# Patient Record
Sex: FEMALE | Race: BLACK OR AFRICAN AMERICAN | Employment: FULL TIME | URBAN - METROPOLITAN AREA
[De-identification: names, ages, dates, MRNs, and addresses within clinical notes are randomized per-mention and may not be internally consistent; named-entity substitution may affect disease eponyms.]

---

## 2017-01-06 ENCOUNTER — TELEPHONE (OUTPATIENT)
Dept: FAMILY MEDICINE CLINIC | Facility: CLINIC | Age: 57
End: 2017-01-06

## 2017-01-10 ENCOUNTER — TELEPHONE (OUTPATIENT)
Dept: FAMILY MEDICINE CLINIC | Facility: CLINIC | Age: 57
End: 2017-01-10

## 2017-01-10 NOTE — TELEPHONE ENCOUNTER
Call placed to pt. Normal mammogram results given via phone. Pt verbalized understanding. No further questions.

## 2017-01-14 ENCOUNTER — TELEPHONE (OUTPATIENT)
Dept: FAMILY MEDICINE CLINIC | Facility: CLINIC | Age: 57
End: 2017-01-14

## 2017-01-14 NOTE — TELEPHONE ENCOUNTER
Norma Conrad from [facility]) breast center calling to get the ICD10 code changed to diagnostic instead of a screening please advise

## 2017-01-26 ENCOUNTER — OFFICE VISIT (OUTPATIENT)
Dept: FAMILY MEDICINE CLINIC | Facility: CLINIC | Age: 57
End: 2017-01-26

## 2017-01-26 ENCOUNTER — TELEPHONE (OUTPATIENT)
Dept: FAMILY MEDICINE CLINIC | Facility: CLINIC | Age: 57
End: 2017-01-26

## 2017-01-26 VITALS
HEART RATE: 86 BPM | TEMPERATURE: 98 F | DIASTOLIC BLOOD PRESSURE: 90 MMHG | HEIGHT: 65 IN | SYSTOLIC BLOOD PRESSURE: 139 MMHG | BODY MASS INDEX: 43.42 KG/M2 | WEIGHT: 260.63 LBS

## 2017-01-26 DIAGNOSIS — Z98.890 S/P BUNIONECTOMY: ICD-10-CM

## 2017-01-26 DIAGNOSIS — M79.672 FOOT PAIN, LEFT: Primary | ICD-10-CM

## 2017-01-26 DIAGNOSIS — E66.01 MORBID OBESITY WITH BMI OF 40.0-44.9, ADULT (HCC): ICD-10-CM

## 2017-01-26 PROCEDURE — 99212 OFFICE O/P EST SF 10 MIN: CPT | Performed by: FAMILY MEDICINE

## 2017-01-26 PROCEDURE — 99213 OFFICE O/P EST LOW 20 MIN: CPT | Performed by: FAMILY MEDICINE

## 2017-01-26 RX ORDER — PHENTERMINE HYDROCHLORIDE 30 MG/1
CAPSULE ORAL
Qty: 30 CAPSULE | Refills: 5 | Status: SHIPPED | OUTPATIENT
Start: 2017-01-26 | End: 2017-09-25 | Stop reason: ALTCHOICE

## 2017-01-26 NOTE — TELEPHONE ENCOUNTER
Alisson Fernandez. I am seeing our common patient Sheridan Mota today. She underwent left foot bunionectomy and second toe hammer toe corrective surgery in January, 2016, one year ago, and is still having a lot of pain, cannot walk comfortably.  The dorsum of

## 2017-01-26 NOTE — TELEPHONE ENCOUNTER
Sorry to hear that, should not be. i havent seen her for a few months, please send her over and will take a closer look.  Thank you scr

## 2017-01-26 NOTE — PROGRESS NOTES
HPI:    Patient ID: Ulises Short is a 61year old female. Patient presents with: Foot Pain: Pt had bunionectomy done last Jan, but states her left foot is still hurting and swelling. Pt states hurts to walk. Pt states pain is 7/10.  Pt states she has been 40.0-44.9, adult (hcc)  1. Foot pain, left  Resulting from previous anatomic problems and surgery  Will contact Dr Cong Michelle and seek his advice re next step(s)  Handicapped parking sticker application form completed and signed.  Pt ok with otc ibuprofen for p

## 2017-01-27 NOTE — TELEPHONE ENCOUNTER
Call pt. Dr Ariel Grijalva got back in touch with me. Next step for evaluating ongoing foot pain is to see Dr Ariel Grijalva again. Have pt call his office for an appt.

## 2017-01-30 ENCOUNTER — OFFICE VISIT (OUTPATIENT)
Dept: PODIATRY CLINIC | Facility: CLINIC | Age: 57
End: 2017-01-30

## 2017-01-30 ENCOUNTER — HOSPITAL ENCOUNTER (OUTPATIENT)
Dept: GENERAL RADIOLOGY | Facility: HOSPITAL | Age: 57
Discharge: HOME | End: 2017-01-30
Attending: PODIATRIST
Payer: COMMERCIAL

## 2017-01-30 DIAGNOSIS — Z47.89 ORTHOPEDIC AFTERCARE: ICD-10-CM

## 2017-01-30 DIAGNOSIS — Z47.89 ORTHOPEDIC AFTERCARE: Primary | ICD-10-CM

## 2017-01-30 DIAGNOSIS — M20.12 HALLUX VALGUS OF LEFT FOOT: ICD-10-CM

## 2017-01-30 PROCEDURE — 99213 OFFICE O/P EST LOW 20 MIN: CPT | Performed by: PODIATRIST

## 2017-01-30 PROCEDURE — L4386 NON-PNEUM WALK BOOT PRE CST: HCPCS | Performed by: PODIATRIST

## 2017-01-30 PROCEDURE — 99212 OFFICE O/P EST SF 10 MIN: CPT | Performed by: PODIATRIST

## 2017-01-30 PROCEDURE — 73630 X-RAY EXAM OF FOOT: CPT

## 2017-01-30 NOTE — PROGRESS NOTES
HPI:    Patient ID: Jeane Godwin is a 61year old female. HPI  This 58-year-old female presents with continued left forefoot pain. She is accompanied today by her .    contacted me mid week last week about the redness continued pain and excellent passive motion of the metatarsophalangeal joint without significant pain but there is palpable discomfort in the osteotomy region. 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   Based on the fact that pa

## 2017-03-01 ENCOUNTER — OFFICE VISIT (OUTPATIENT)
Dept: PODIATRY CLINIC | Facility: CLINIC | Age: 57
End: 2017-03-01

## 2017-03-01 DIAGNOSIS — M20.12 HALLUX VALGUS OF LEFT FOOT: Primary | ICD-10-CM

## 2017-03-01 PROCEDURE — 99212 OFFICE O/P EST SF 10 MIN: CPT | Performed by: PODIATRIST

## 2017-03-01 NOTE — PROGRESS NOTES
HPI:    Patient ID: Isabel Otero is a 61year old female. HPI  This 78-year-old female presents for follow-up in reference to pain and swelling of the left great toe. Patient states that she is a minimum of 70% better.   She has noticed a dramatic relie

## 2017-04-20 ENCOUNTER — OFFICE VISIT (OUTPATIENT)
Dept: PODIATRY CLINIC | Facility: CLINIC | Age: 57
End: 2017-04-20

## 2017-04-20 DIAGNOSIS — M20.12 HALLUX VALGUS OF LEFT FOOT: Primary | ICD-10-CM

## 2017-04-20 NOTE — PROGRESS NOTES
HPI:    Patient ID: Isabel Otero is a 61year old female. HPI  This 79-year-old female presents for follow-up in reference to the pain and swelling of the left foot.   Patient states when wearing the Cam Walker she is doing extremely well and feels much

## 2017-09-25 ENCOUNTER — OFFICE VISIT (OUTPATIENT)
Dept: FAMILY MEDICINE CLINIC | Facility: CLINIC | Age: 57
End: 2017-09-25

## 2017-09-25 VITALS
WEIGHT: 256 LBS | RESPIRATION RATE: 18 BRPM | BODY MASS INDEX: 43 KG/M2 | TEMPERATURE: 98 F | DIASTOLIC BLOOD PRESSURE: 83 MMHG | HEART RATE: 91 BPM | SYSTOLIC BLOOD PRESSURE: 124 MMHG

## 2017-09-25 DIAGNOSIS — M79.672 LEFT FOOT PAIN: Primary | ICD-10-CM

## 2017-09-25 DIAGNOSIS — M25.512 ACUTE PAIN OF LEFT SHOULDER: ICD-10-CM

## 2017-09-25 PROCEDURE — 99212 OFFICE O/P EST SF 10 MIN: CPT | Performed by: FAMILY MEDICINE

## 2017-09-25 PROCEDURE — 93000 ELECTROCARDIOGRAM COMPLETE: CPT | Performed by: FAMILY MEDICINE

## 2017-09-25 PROCEDURE — 93005 ELECTROCARDIOGRAM TRACING: CPT | Performed by: FAMILY MEDICINE

## 2017-09-25 PROCEDURE — 99214 OFFICE O/P EST MOD 30 MIN: CPT | Performed by: FAMILY MEDICINE

## 2017-09-25 NOTE — PROGRESS NOTES
HPI:  61 yr old female who presents for pain. Overall, healthy. Had left bunionectomy last year. Follows with Dr. Dulce Hernández. Pt started having some swelling on the left foot recently. Has area on the dorsal surface of foot which is getting larger.   Non-tend

## 2018-01-21 ENCOUNTER — APPOINTMENT (OUTPATIENT)
Dept: GENERAL RADIOLOGY | Age: 58
End: 2018-01-21
Attending: PEDIATRICS
Payer: COMMERCIAL

## 2018-01-21 ENCOUNTER — HOSPITAL ENCOUNTER (OUTPATIENT)
Age: 58
Discharge: HOME | End: 2018-01-21
Attending: PEDIATRICS
Payer: COMMERCIAL

## 2018-01-21 VITALS
RESPIRATION RATE: 24 BRPM | DIASTOLIC BLOOD PRESSURE: 68 MMHG | SYSTOLIC BLOOD PRESSURE: 150 MMHG | HEART RATE: 118 BPM | OXYGEN SATURATION: 95 % | TEMPERATURE: 102 F

## 2018-01-21 DIAGNOSIS — J18.9 COMMUNITY ACQUIRED PNEUMONIA OF RIGHT UPPER LOBE OF LUNG: Primary | ICD-10-CM

## 2018-01-21 PROCEDURE — 71046 X-RAY EXAM CHEST 2 VIEWS: CPT | Performed by: PEDIATRICS

## 2018-01-21 PROCEDURE — 99213 OFFICE O/P EST LOW 20 MIN: CPT

## 2018-01-21 PROCEDURE — 99204 OFFICE O/P NEW MOD 45 MIN: CPT

## 2018-01-21 RX ORDER — MOXIFLOXACIN HYDROCHLORIDE 400 MG/1
400 TABLET ORAL DAILY
Qty: 10 TABLET | Refills: 0 | Status: SHIPPED | OUTPATIENT
Start: 2018-01-21 | End: 2018-01-24 | Stop reason: ALTCHOICE

## 2018-01-21 RX ORDER — MOXIFLOXACIN HYDROCHLORIDE 400 MG/1
400 TABLET ORAL DAILY
Qty: 10 TABLET | Refills: 0 | Status: SHIPPED | OUTPATIENT
Start: 2018-01-21 | End: 2018-01-21

## 2018-01-21 NOTE — ED PROVIDER NOTES
Patient presents with:  Cough/URI      HPI:     Isabel Otero is a 61year old female who presents for evaluation of a chief complaint of congestion, headache, cough for approximately 4-5 days. She also notes a fever for the last couple of days.   She denie the emergency room. All results reviewed and discussed with patient. See AVS for detailed discharge instructions for your condition today.     Follow Up with:  Marjorie Richardson DO  [address] [address] [facility] [place] [address] [phone]    [place]

## 2018-01-21 NOTE — ED INITIAL ASSESSMENT (HPI)
Patient presents with a moist nonproductive cough since Thursday with associated headache and fever. Denies congestion or other upper respiratory symptoms.

## 2018-01-24 ENCOUNTER — OFFICE VISIT (OUTPATIENT)
Dept: FAMILY MEDICINE CLINIC | Facility: CLINIC | Age: 58
End: 2018-01-24

## 2018-01-24 VITALS
HEART RATE: 84 BPM | RESPIRATION RATE: 18 BRPM | DIASTOLIC BLOOD PRESSURE: 75 MMHG | SYSTOLIC BLOOD PRESSURE: 111 MMHG | WEIGHT: 258 LBS | BODY MASS INDEX: 43 KG/M2 | TEMPERATURE: 98 F | OXYGEN SATURATION: 95 %

## 2018-01-24 DIAGNOSIS — J18.9 PNEUMONIA OF RIGHT UPPER LOBE DUE TO INFECTIOUS ORGANISM: Primary | ICD-10-CM

## 2018-01-24 PROCEDURE — 99212 OFFICE O/P EST SF 10 MIN: CPT | Performed by: FAMILY MEDICINE

## 2018-01-24 PROCEDURE — 99214 OFFICE O/P EST MOD 30 MIN: CPT | Performed by: FAMILY MEDICINE

## 2018-01-24 RX ORDER — CODEINE PHOSPHATE AND GUAIFENESIN 10; 100 MG/5ML; MG/5ML
5 SOLUTION ORAL NIGHTLY PRN
Qty: 60 ML | Refills: 0 | Status: SHIPPED | OUTPATIENT
Start: 2018-01-24 | End: 2018-02-08

## 2018-01-24 RX ORDER — AMOXICILLIN AND CLAVULANATE POTASSIUM 875; 125 MG/1; MG/1
1 TABLET, FILM COATED ORAL 2 TIMES DAILY
Qty: 20 TABLET | Refills: 0 | Status: SHIPPED | OUTPATIENT
Start: 2018-01-24 | End: 2018-02-03

## 2018-01-25 NOTE — PROGRESS NOTES
HPI: Rola Miramontes is a 61year old female who presents for cough. Went to [facility] 3 days ago. Diagnosed with right upper lobe pneumonia. Started on Moxifloxacin. Taking daily. Feels like cough has improved. Pt does have insomnia from medication.   Has de upper lobe. Assessment:/Plan:  Pneumonia of right upper lobe due to infectious organism (hcc)  (primary encounter diagnosis)     Pt is not tolerating the Moxifloxacin. Will start Augmentin. Advised rest and fluids. May use Cheratussin for cough.

## 2018-02-08 ENCOUNTER — OFFICE VISIT (OUTPATIENT)
Dept: FAMILY MEDICINE CLINIC | Facility: CLINIC | Age: 58
End: 2018-02-08

## 2018-02-08 VITALS
TEMPERATURE: 98 F | SYSTOLIC BLOOD PRESSURE: 118 MMHG | BODY MASS INDEX: 43 KG/M2 | OXYGEN SATURATION: 94 % | HEART RATE: 86 BPM | RESPIRATION RATE: 18 BRPM | WEIGHT: 259 LBS | DIASTOLIC BLOOD PRESSURE: 74 MMHG

## 2018-02-08 DIAGNOSIS — J18.9 PNEUMONIA OF RIGHT UPPER LOBE DUE TO INFECTIOUS ORGANISM: Primary | ICD-10-CM

## 2018-02-08 DIAGNOSIS — Z12.39 SCREENING FOR BREAST CANCER: ICD-10-CM

## 2018-02-08 PROCEDURE — 99212 OFFICE O/P EST SF 10 MIN: CPT | Performed by: FAMILY MEDICINE

## 2018-02-08 PROCEDURE — 99213 OFFICE O/P EST LOW 20 MIN: CPT | Performed by: FAMILY MEDICINE

## 2018-02-08 RX ORDER — AZITHROMYCIN 250 MG/1
TABLET, FILM COATED ORAL
Qty: 6 TABLET | Refills: 0 | Status: SHIPPED | OUTPATIENT
Start: 2018-02-08 | End: 2018-05-31

## 2018-02-08 NOTE — PROGRESS NOTES
HPI: Philipp Danielle is a 61year old female who presents for follow-up pneumonia. Pt finished course of antibiotics. She then went back and finished course of Moxifloxacin. Cough is still present but not as frequent. Has coughing attacks. Taking OTC.   Still has with results.     Shaq Sprague DO

## 2018-05-14 ENCOUNTER — TELEPHONE (OUTPATIENT)
Dept: INTERNAL MEDICINE CLINIC | Facility: CLINIC | Age: 58
End: 2018-05-14

## 2018-05-14 NOTE — TELEPHONE ENCOUNTER
Can you please send her mammogram order to my chart   She is going to [facility] for testing.

## 2018-05-31 ENCOUNTER — APPOINTMENT (OUTPATIENT)
Dept: LAB | Age: 58
End: 2018-05-31
Attending: FAMILY MEDICINE
Payer: COMMERCIAL

## 2018-05-31 ENCOUNTER — OFFICE VISIT (OUTPATIENT)
Dept: FAMILY MEDICINE CLINIC | Facility: CLINIC | Age: 58
End: 2018-05-31

## 2018-05-31 VITALS
RESPIRATION RATE: 18 BRPM | BODY MASS INDEX: 45.03 KG/M2 | WEIGHT: 267 LBS | HEIGHT: 64.5 IN | SYSTOLIC BLOOD PRESSURE: 126 MMHG | TEMPERATURE: 98 F | HEART RATE: 87 BPM | DIASTOLIC BLOOD PRESSURE: 84 MMHG

## 2018-05-31 DIAGNOSIS — E66.01 CLASS 3 SEVERE OBESITY DUE TO EXCESS CALORIES WITHOUT SERIOUS COMORBIDITY WITH BODY MASS INDEX (BMI) OF 45.0 TO 49.9 IN ADULT (HCC): ICD-10-CM

## 2018-05-31 DIAGNOSIS — Z11.59 NEED FOR HEPATITIS C SCREENING TEST: ICD-10-CM

## 2018-05-31 DIAGNOSIS — Z00.00 ROUTINE PHYSICAL EXAMINATION: Primary | ICD-10-CM

## 2018-05-31 DIAGNOSIS — Z01.419 ENCOUNTER FOR ANNUAL ROUTINE GYNECOLOGICAL EXAMINATION: ICD-10-CM

## 2018-05-31 DIAGNOSIS — K63.5 POLYP OF COLON, UNSPECIFIED PART OF COLON, UNSPECIFIED TYPE: ICD-10-CM

## 2018-05-31 DIAGNOSIS — M79.672 LEFT FOOT PAIN: ICD-10-CM

## 2018-05-31 PROCEDURE — 36415 COLL VENOUS BLD VENIPUNCTURE: CPT

## 2018-05-31 PROCEDURE — 86803 HEPATITIS C AB TEST: CPT

## 2018-05-31 PROCEDURE — 99396 PREV VISIT EST AGE 40-64: CPT | Performed by: FAMILY MEDICINE

## 2018-05-31 PROCEDURE — 90715 TDAP VACCINE 7 YRS/> IM: CPT | Performed by: FAMILY MEDICINE

## 2018-05-31 PROCEDURE — 90471 IMMUNIZATION ADMIN: CPT | Performed by: FAMILY MEDICINE

## 2018-05-31 RX ORDER — PHENTERMINE HYDROCHLORIDE 30 MG/1
30 CAPSULE ORAL EVERY MORNING
Qty: 30 CAPSULE | Refills: 1 | Status: SHIPPED | OUTPATIENT
Start: 2018-05-31 | End: 2019-06-17 | Stop reason: ALTCHOICE

## 2018-05-31 NOTE — PROGRESS NOTES
HPI:  60 yr old female who presents for physical. Overall, feeling well. Father passed away last Saturday from cancer. Pt is interested in losing weight. She has used Phentermine in the past.  She did not have any side effects. .   Has one son and o canals clear. Yohannes TMs intact with good landmarks noted. Nares patent. Oral mucous membrane moist.  Normal lips, teeth, and gums. Oropharynx normal.  Neck supple. Good ROM. No LAD.   Thyroid normal.  CV:  Regular rate and rhythm; no murmurs  Lungs:  Cl

## 2018-06-25 ENCOUNTER — PATIENT MESSAGE (OUTPATIENT)
Dept: FAMILY MEDICINE CLINIC | Facility: CLINIC | Age: 58
End: 2018-06-25

## 2018-06-25 NOTE — TELEPHONE ENCOUNTER
From: Chacorta Bar  To: Kraig Dean DO  Sent: 6/25/2018 12:37 PM CDT  Subject: Test Results Question    I have a question about THINPREP PAP WITH HPV REFLEX REQUEST resulted on 6/5/18, 8:43 AM.    What does this mean?  Was it Normal? Do I need more t

## 2018-06-25 NOTE — TELEPHONE ENCOUNTER
From: Ulises How  To: Tray Guidry DO  Sent: 6/25/2018 12:46 PM CDT  Subject: Referral Request    Can I make my mammogram appointment at the place I always go to?  The referral said make the appointment at [facility].

## 2018-08-03 ENCOUNTER — OFFICE VISIT (OUTPATIENT)
Dept: PODIATRY CLINIC | Facility: CLINIC | Age: 58
End: 2018-08-03
Payer: COMMERCIAL

## 2018-08-03 ENCOUNTER — HOSPITAL ENCOUNTER (OUTPATIENT)
Dept: GENERAL RADIOLOGY | Facility: HOSPITAL | Age: 58
Discharge: HOME | End: 2018-08-03
Attending: PODIATRIST
Payer: COMMERCIAL

## 2018-08-03 DIAGNOSIS — Z47.89 ORTHOPEDIC AFTERCARE: ICD-10-CM

## 2018-08-03 DIAGNOSIS — M19.90 ARTHRITIS: ICD-10-CM

## 2018-08-03 DIAGNOSIS — Z47.89 ORTHOPEDIC AFTERCARE: Primary | ICD-10-CM

## 2018-08-03 DIAGNOSIS — M79.672 FOOT PAIN, LEFT: ICD-10-CM

## 2018-08-03 PROCEDURE — 99213 OFFICE O/P EST LOW 20 MIN: CPT | Performed by: PODIATRIST

## 2018-08-03 PROCEDURE — 73630 X-RAY EXAM OF FOOT: CPT | Performed by: PODIATRIST

## 2018-08-03 PROCEDURE — 99212 OFFICE O/P EST SF 10 MIN: CPT | Performed by: PODIATRIST

## 2018-08-03 NOTE — PROGRESS NOTES
HPI:    Patient ID: Sheridan Mota is a 60year old female. HPI  This 43-year-old female presents with concerns associated with the dorsal aspect of the left foot. Patient is not aware of a specific timeframe I have not seen her in well over a year.   But meals as an anti-inflammatory over the next 2 weeks. I instructed her in appropriate supportive shoes and and also recommended icing twice every evening for 15 minutes. She was instructed to stop the medication after 2 weeks and reevaluate a week later.

## 2018-10-03 ENCOUNTER — HOSPITAL ENCOUNTER (OUTPATIENT)
Age: 58
Discharge: HOME | End: 2018-10-03
Attending: FAMILY MEDICINE
Payer: COMMERCIAL

## 2018-10-03 VITALS
HEART RATE: 91 BPM | SYSTOLIC BLOOD PRESSURE: 141 MMHG | TEMPERATURE: 99 F | RESPIRATION RATE: 22 BRPM | DIASTOLIC BLOOD PRESSURE: 65 MMHG | OXYGEN SATURATION: 95 %

## 2018-10-03 DIAGNOSIS — J01.10 ACUTE NON-RECURRENT FRONTAL SINUSITIS: Primary | ICD-10-CM

## 2018-10-03 PROCEDURE — 99213 OFFICE O/P EST LOW 20 MIN: CPT

## 2018-10-03 PROCEDURE — 99214 OFFICE O/P EST MOD 30 MIN: CPT

## 2018-10-03 PROCEDURE — 87430 STREP A AG IA: CPT

## 2018-10-03 RX ORDER — AMOXICILLIN AND CLAVULANATE POTASSIUM 875; 125 MG/1; MG/1
1 TABLET, FILM COATED ORAL 2 TIMES DAILY
Qty: 14 TABLET | Refills: 0 | Status: SHIPPED | OUTPATIENT
Start: 2018-10-03 | End: 2018-10-10

## 2018-10-04 NOTE — ED INITIAL ASSESSMENT (HPI)
Pt here with complaints of congestion, cough, headache and sore throat for 3 weeks now, pt has tried otc meds but obtained no relief, pt states she was having some fevers during this time, denies sob

## 2018-10-04 NOTE — ED PROVIDER NOTES
[address]    History   Patient presents with:  Cough/URI    Stated Complaint: cold    HPI    60year old patient  presents with nasal congestion, sinus pressure and cough for 3 weeks .   + throat pain and No      Review of Systems    Positive for stated complaint: cold  Other systems are as noted in HPI. Constitutional and vital signs reviewed. All other systems reviewed and negative except as noted above.     PSFH elements reviewed from today and agre Tab  Take 1 tablet by mouth 2 (two) times daily for 7 days.   Qty: 14 tablet Refills: 0

## 2018-11-01 ENCOUNTER — TELEPHONE (OUTPATIENT)
Dept: GASTROENTEROLOGY | Facility: CLINIC | Age: 58
End: 2018-11-01

## 2018-11-01 ENCOUNTER — OFFICE VISIT (OUTPATIENT)
Dept: FAMILY MEDICINE CLINIC | Facility: CLINIC | Age: 58
End: 2018-11-01
Payer: COMMERCIAL

## 2018-11-01 VITALS
DIASTOLIC BLOOD PRESSURE: 85 MMHG | OXYGEN SATURATION: 94 % | SYSTOLIC BLOOD PRESSURE: 121 MMHG | TEMPERATURE: 98 F | HEART RATE: 89 BPM | RESPIRATION RATE: 18 BRPM | BODY MASS INDEX: 46 KG/M2 | WEIGHT: 272 LBS

## 2018-11-01 DIAGNOSIS — Z12.11 COLON CANCER SCREENING: Primary | ICD-10-CM

## 2018-11-01 DIAGNOSIS — R05.9 COUGH: ICD-10-CM

## 2018-11-01 DIAGNOSIS — R49.0 HOARSENESS OF VOICE: Primary | ICD-10-CM

## 2018-11-01 PROCEDURE — 99213 OFFICE O/P EST LOW 20 MIN: CPT | Performed by: FAMILY MEDICINE

## 2018-11-01 PROCEDURE — 99212 OFFICE O/P EST SF 10 MIN: CPT | Performed by: FAMILY MEDICINE

## 2018-11-01 RX ORDER — AZITHROMYCIN 250 MG/1
TABLET, FILM COATED ORAL
Qty: 6 TABLET | Refills: 0 | Status: SHIPPED | OUTPATIENT
Start: 2018-11-01 | End: 2018-11-14 | Stop reason: ALTCHOICE

## 2018-11-01 RX ORDER — POLYETHYLENE GLYCOL 3350, SODIUM CHLORIDE, SODIUM BICARBONATE, POTASSIUM CHLORIDE 420; 11.2; 5.72; 1.48 G/4L; G/4L; G/4L; G/4L
POWDER, FOR SOLUTION ORAL
Qty: 1 BOTTLE | Refills: 0 | Status: SHIPPED | OUTPATIENT
Start: 2018-11-01 | End: 2019-03-05 | Stop reason: ALTCHOICE

## 2018-11-01 NOTE — PROGRESS NOTES
HPI:  Luz Marina Chicas is a 60year old female who presents for cold symptoms for the past few weeks. Pt states it started with sore throat at the end of September. Went to Urgent Care on 10/3. Started on Augmentin. She felt like symptoms improved.  Still has cough diagnosis)  Cough     Will try Zpack. Advised rest and fluids. If no improvement, plan for chest xray.     Leola Phalen, DO

## 2018-11-01 NOTE — TELEPHONE ENCOUNTER
D/w staff at [facility]. Patient due for c-scope. Last in 2012 with Dr. Marcie Ribeiro, with polyps. I spoke with Eugenio Zurita-- she denies any GI symptoms. Feels well other than a cold she has now on abx. She wants to schedule c-scope directly. No cp/sob.  High sedati

## 2018-11-01 NOTE — PATIENT INSTRUCTIONS
Take Zyrtec D or Allegra D    Take Flonase daily    Place vaseline on end of cotton swab and apply to inside of nose daily.

## 2018-11-08 NOTE — TELEPHONE ENCOUNTER
CBLM to schedule procedure. Please transfer to Angelique Zhao at ext [phone] or [phone] for scheduling. Or please transfer to Replaced by [facility] in GI if unavailable.

## 2018-11-14 ENCOUNTER — HOSPITAL ENCOUNTER (OUTPATIENT)
Dept: GENERAL RADIOLOGY | Age: 58
Discharge: HOME | End: 2018-11-14
Attending: FAMILY MEDICINE
Payer: COMMERCIAL

## 2018-11-14 ENCOUNTER — OFFICE VISIT (OUTPATIENT)
Dept: FAMILY MEDICINE CLINIC | Facility: CLINIC | Age: 58
End: 2018-11-14
Payer: COMMERCIAL

## 2018-11-14 VITALS
RESPIRATION RATE: 18 BRPM | SYSTOLIC BLOOD PRESSURE: 144 MMHG | BODY MASS INDEX: 46 KG/M2 | HEART RATE: 90 BPM | TEMPERATURE: 98 F | DIASTOLIC BLOOD PRESSURE: 84 MMHG | WEIGHT: 273 LBS | OXYGEN SATURATION: 93 %

## 2018-11-14 DIAGNOSIS — R05.9 COUGH: Primary | ICD-10-CM

## 2018-11-14 DIAGNOSIS — M25.561 ACUTE PAIN OF RIGHT KNEE: ICD-10-CM

## 2018-11-14 DIAGNOSIS — R05.9 COUGH: ICD-10-CM

## 2018-11-14 PROCEDURE — 90686 IIV4 VACC NO PRSV 0.5 ML IM: CPT | Performed by: FAMILY MEDICINE

## 2018-11-14 PROCEDURE — 99212 OFFICE O/P EST SF 10 MIN: CPT | Performed by: FAMILY MEDICINE

## 2018-11-14 PROCEDURE — 90471 IMMUNIZATION ADMIN: CPT | Performed by: FAMILY MEDICINE

## 2018-11-14 PROCEDURE — 73560 X-RAY EXAM OF KNEE 1 OR 2: CPT | Performed by: FAMILY MEDICINE

## 2018-11-14 PROCEDURE — 99214 OFFICE O/P EST MOD 30 MIN: CPT | Performed by: FAMILY MEDICINE

## 2018-11-14 PROCEDURE — 71046 X-RAY EXAM CHEST 2 VIEWS: CPT | Performed by: FAMILY MEDICINE

## 2018-11-14 RX ORDER — CODEINE PHOSPHATE AND GUAIFENESIN 10; 100 MG/5ML; MG/5ML
10 SOLUTION ORAL NIGHTLY PRN
Qty: 100 ML | Refills: 0 | Status: SHIPPED | OUTPATIENT
Start: 2018-11-14 | End: 2019-03-05 | Stop reason: ALTCHOICE

## 2018-11-15 NOTE — PROGRESS NOTES
HPI: Kierra Dunlap is a 60year old female who presents for cough follow-up. Finished course of Zpack and Augmentin before that. Pt states she is still coughing. Pt feels like it is triggered by different environments. Took Mucinex and Zantac without relief.  No crepitation noted on flexion and extension. Tenderness noted to lateral knee and tibia. Assessment:/Plan:  Cough     Advised daily anti-histamine and Flonase. Will get CXR. Acute pain of right knee    Check xr knee. Advised ice and Ibuprofen.   Brooks

## 2018-11-16 DIAGNOSIS — G89.29 CHRONIC PAIN OF RIGHT KNEE: Primary | ICD-10-CM

## 2018-11-16 DIAGNOSIS — M25.561 CHRONIC PAIN OF RIGHT KNEE: Primary | ICD-10-CM

## 2018-11-19 RX ORDER — FLUTICASONE PROPIONATE 50 MCG
2 SPRAY, SUSPENSION (ML) NASAL DAILY
Qty: 1 BOTTLE | Refills: 3 | Status: SHIPPED | OUTPATIENT
Start: 2018-11-19 | End: 2019-03-05

## 2018-11-19 RX ORDER — FEXOFENADINE HCL 180 MG/1
180 TABLET ORAL DAILY
Qty: 30 TABLET | Refills: 1 | Status: SHIPPED | OUTPATIENT
Start: 2018-11-19 | End: 2019-03-05

## 2019-02-11 ENCOUNTER — PATIENT MESSAGE (OUTPATIENT)
Dept: FAMILY MEDICINE CLINIC | Facility: CLINIC | Age: 59
End: 2019-02-11

## 2019-02-11 NOTE — TELEPHONE ENCOUNTER
From: Ulises How  To:  Tray Guidry DO  Sent: 2/11/2019 12:15 PM CST  Subject: Other    Gibson Real or her assistant can you please send me a copy of my referral for an orthopedic doctor and also for my colonoscopy I don't see either one of those for

## 2019-02-25 ENCOUNTER — HOSPITAL ENCOUNTER (OUTPATIENT)
Age: 59
Discharge: HOME | End: 2019-02-25
Attending: FAMILY MEDICINE
Payer: COMMERCIAL

## 2019-02-25 ENCOUNTER — APPOINTMENT (OUTPATIENT)
Dept: GENERAL RADIOLOGY | Age: 59
End: 2019-02-25
Attending: FAMILY MEDICINE
Payer: COMMERCIAL

## 2019-02-25 VITALS
BODY MASS INDEX: 45.47 KG/M2 | WEIGHT: 272.94 LBS | TEMPERATURE: 98 F | SYSTOLIC BLOOD PRESSURE: 129 MMHG | HEART RATE: 84 BPM | RESPIRATION RATE: 16 BRPM | OXYGEN SATURATION: 100 % | HEIGHT: 65 IN | DIASTOLIC BLOOD PRESSURE: 72 MMHG

## 2019-02-25 DIAGNOSIS — S80.02XA CONTUSION OF LEFT KNEE, INITIAL ENCOUNTER: ICD-10-CM

## 2019-02-25 DIAGNOSIS — S82.891A CLOSED FRACTURE OF RIGHT ANKLE, INITIAL ENCOUNTER: Primary | ICD-10-CM

## 2019-02-25 PROCEDURE — 29515 APPLICATION SHORT LEG SPLINT: CPT

## 2019-02-25 PROCEDURE — 73610 X-RAY EXAM OF ANKLE: CPT | Performed by: FAMILY MEDICINE

## 2019-02-25 PROCEDURE — 99214 OFFICE O/P EST MOD 30 MIN: CPT

## 2019-02-25 PROCEDURE — 73562 X-RAY EXAM OF KNEE 3: CPT | Performed by: FAMILY MEDICINE

## 2019-02-25 PROCEDURE — 73560 X-RAY EXAM OF KNEE 1 OR 2: CPT | Performed by: FAMILY MEDICINE

## 2019-02-25 RX ORDER — IBUPROFEN 600 MG/1
600 TABLET ORAL EVERY 8 HOURS PRN
Qty: 30 TABLET | Refills: 0 | Status: SHIPPED | OUTPATIENT
Start: 2019-02-25 | End: 2019-03-01

## 2019-02-26 ENCOUNTER — TELEPHONE (OUTPATIENT)
Dept: ORTHOPEDICS CLINIC | Facility: CLINIC | Age: 59
End: 2019-02-26

## 2019-02-26 ENCOUNTER — HOSPITAL ENCOUNTER (OUTPATIENT)
Dept: GENERAL RADIOLOGY | Facility: HOSPITAL | Age: 59
Discharge: HOME | End: 2019-02-26
Attending: ORTHOPAEDIC SURGERY
Payer: COMMERCIAL

## 2019-02-26 ENCOUNTER — OFFICE VISIT (OUTPATIENT)
Dept: ORTHOPEDICS CLINIC | Facility: CLINIC | Age: 59
End: 2019-02-26
Payer: COMMERCIAL

## 2019-02-26 DIAGNOSIS — M25.571 RIGHT ANKLE PAIN, UNSPECIFIED CHRONICITY: ICD-10-CM

## 2019-02-26 DIAGNOSIS — S82.831A DISPLACED FRACTURE OF DISTAL END OF RIGHT FIBULA: Primary | ICD-10-CM

## 2019-02-26 PROCEDURE — 99203 OFFICE O/P NEW LOW 30 MIN: CPT | Performed by: ORTHOPAEDIC SURGERY

## 2019-02-26 PROCEDURE — 99212 OFFICE O/P EST SF 10 MIN: CPT | Performed by: ORTHOPAEDIC SURGERY

## 2019-02-26 PROCEDURE — L4360 PNEUMAT WALKING BOOT PRE CST: HCPCS | Performed by: ORTHOPAEDIC SURGERY

## 2019-02-26 PROCEDURE — 73610 X-RAY EXAM OF ANKLE: CPT | Performed by: ORTHOPAEDIC SURGERY

## 2019-02-26 NOTE — ED PROVIDER NOTES
Patient Seen in: [address]    History   Patient presents with:  Fall (musculoskeletal, neurologic)    Stated Complaint: pt fell, both knee cap pain    HPI    HPI: Isabel Otero is a 60year old female who presents after Ointment,  Apply 1 Application topically 3 (three) times daily.        Family History   Problem Relation Age of Onset   • Hypertension Father    • Cancer Father         liver   • Hypertension Sister    • Diabetes Sister        Social History    Tobacco Use include fracture vs. Strain/sprain vs. contusion        ED Course   Labs Reviewed - No data to display    MDM     Radiology: @Xr Ankle (min 3 Views), Right (cpt=73610)    Result Date: 2/25/2019  CONCLUSION: Acute mildly comminuted oblique/spiral fracture i knee, initial encounter    Disposition:  Discharge    Follow-up:  Tomas Salgado  [address] [facility]  Naila JOSE 25    Schedule an appointment as soon as possible for a visit in 1 day        Medications Prescribed:  Current

## 2019-02-26 NOTE — TELEPHONE ENCOUNTER
Pt procedure/Testing: CT right ankle  Pt insurance/number to contact: [employer]  Insurance ID# and UJSUM:[id number]  CPT: [id number]  Indication for test: fracture  Procedure scheduled date: 2/27/2019  Procedure scheduled where: [address] [place] [address]  Pt authorization number: [id number]

## 2019-02-26 NOTE — ED INITIAL ASSESSMENT (HPI)
Per pt slipped and fell twice today once slipped and fell on floor the second time was on her way here and slipped down stairs.  Pt reports pain to left knee and right ankle

## 2019-02-26 NOTE — PROGRESS NOTES
2/26/2019  Evelio Irvin  3/30/1960  60year old   female  Chito Danielson MD    HPI:   Patient presents with: Ankle Pain: Right ankle injury- pt states she slipped and fell on ice on 2/25/29 and then she fell down her stairs. pt went to  and had XR. 200 mg by mouth every 6 (six) hours as needed for Pain.  Disp:  Rfl:       HISTORY:  Past Medical History:   Diagnosis Date   • Gigantomastia    • Lipoma       Past Surgical History:   Procedure Laterality Date   • OTHER SURGICAL HISTORY      left faustino gutierrez fracture of distal end of right fibula  (primary encounter diagnosis)  Right ankle pain, unspecified chronicity    The risks, indications, benefits, and procedures of both operative and non-operative treatment were discussed with the patient.     This is a

## 2019-02-27 ENCOUNTER — HOSPITAL ENCOUNTER (OUTPATIENT)
Dept: CT IMAGING | Facility: HOSPITAL | Age: 59
Discharge: HOME | End: 2019-02-27
Attending: ORTHOPAEDIC SURGERY
Payer: COMMERCIAL

## 2019-02-27 DIAGNOSIS — S82.831A DISPLACED FRACTURE OF DISTAL END OF RIGHT FIBULA: ICD-10-CM

## 2019-02-27 PROCEDURE — 73700 CT LOWER EXTREMITY W/O DYE: CPT | Performed by: ORTHOPAEDIC SURGERY

## 2019-03-01 ENCOUNTER — TELEPHONE (OUTPATIENT)
Dept: ORTHOPEDICS CLINIC | Facility: CLINIC | Age: 59
End: 2019-03-01

## 2019-03-01 ENCOUNTER — OFFICE VISIT (OUTPATIENT)
Dept: ORTHOPEDICS CLINIC | Facility: CLINIC | Age: 59
End: 2019-03-01
Payer: COMMERCIAL

## 2019-03-01 ENCOUNTER — HOSPITAL ENCOUNTER (OUTPATIENT)
Dept: MRI IMAGING | Facility: HOSPITAL | Age: 59
Discharge: HOME | End: 2019-03-01
Attending: ORTHOPAEDIC SURGERY
Payer: COMMERCIAL

## 2019-03-01 DIAGNOSIS — S82.831A DISPLACED FRACTURE OF DISTAL END OF RIGHT FIBULA: Primary | ICD-10-CM

## 2019-03-01 DIAGNOSIS — M25.562 ACUTE PAIN OF LEFT KNEE: ICD-10-CM

## 2019-03-01 DIAGNOSIS — S86.812A PATELLAR TENDON RUPTURE, LEFT, INITIAL ENCOUNTER: ICD-10-CM

## 2019-03-01 PROCEDURE — 73721 MRI JNT OF LWR EXTRE W/O DYE: CPT | Performed by: ORTHOPAEDIC SURGERY

## 2019-03-01 PROCEDURE — 99214 OFFICE O/P EST MOD 30 MIN: CPT | Performed by: ORTHOPAEDIC SURGERY

## 2019-03-01 PROCEDURE — 99212 OFFICE O/P EST SF 10 MIN: CPT | Performed by: ORTHOPAEDIC SURGERY

## 2019-03-01 RX ORDER — TRAMADOL HYDROCHLORIDE 50 MG/1
50 TABLET ORAL EVERY 12 HOURS PRN
Qty: 40 TABLET | Refills: 0 | Status: ON HOLD | OUTPATIENT
Start: 2019-03-01 | End: 2019-03-06

## 2019-03-01 RX ORDER — ENOXAPARIN SODIUM 100 MG/ML
40 INJECTION SUBCUTANEOUS DAILY
Qty: 5 SYRINGE | Refills: 0 | Status: ON HOLD | OUTPATIENT
Start: 2019-03-01 | End: 2019-03-11

## 2019-03-01 NOTE — H&P
3/1/2019  Evelio Irvin  3/30/1960  60year old   female  Chito Danielson MD    HPI:   Patient presents with:  Test Results: CT right ankle -- Rates pain 6-7/10. Knee Pain: left -- Xray taken. Rates pain 10/10 and is having difficulty lifting up.  Lalitha Roca topically 3 (three) times daily. Disp: 15 g Rfl: 2   omega-3 fatty acids (FISH OIL) 1000 MG Oral Cap Take  by mouth. Disp:  Rfl:    Vitamin C (VITAMIN C) 500 MG Oral Chew Tab Chew  by mouth.  Disp:  Rfl:    cholecalciferol (D 1000) 1000 UNITS Oral Cap Take inferior to the patella about the left knee. She has an inability to perform a straight leg raise. There is ecchymosis over the proximal medial aspect of the knee. She has a soft calf negative Homans sign.     IMAGING AND TESTING:  CT scan of the right a left knee.     The risks, benefits, and alternatives of surgical versus non-surgical intervention were discussed in detail and include but are not limited to infection, bleeding, neurovascular injury, need for further surgery, development of deep vein throm

## 2019-03-01 NOTE — H&P (VIEW-ONLY)
3/1/2019  Augusto Lopes  3/30/1960  60year old   female  Jd Kearns MD    HPI:   Patient presents with:  Test Results: CT right ankle -- Rates pain 6-7/10. Knee Pain: left -- Xray taken. Rates pain 10/10 and is having difficulty lifting up.  Chrissy Kern topically 3 (three) times daily. Disp: 15 g Rfl: 2   omega-3 fatty acids (FISH OIL) 1000 MG Oral Cap Take  by mouth. Disp:  Rfl:    Vitamin C (VITAMIN C) 500 MG Oral Chew Tab Chew  by mouth.  Disp:  Rfl:    cholecalciferol (D 1000) 1000 UNITS Oral Cap Take inferior to the patella about the left knee. She has an inability to perform a straight leg raise. There is ecchymosis over the proximal medial aspect of the knee. She has a soft calf negative Homans sign.     IMAGING AND TESTING:  CT scan of the right a left knee.     The risks, benefits, and alternatives of surgical versus non-surgical intervention were discussed in detail and include but are not limited to infection, bleeding, neurovascular injury, need for further surgery, development of deep vein throm

## 2019-03-01 NOTE — TELEPHONE ENCOUNTER
Called [employer] to initiate PA for MRI left knee. Call transferred to RN Emmanuel abdi. Clinicals approved   SMTA#[id number] exp 3/30/19 at [address] [place] [address]    Pt already scheduled for MRI today @ 3/1/19.

## 2019-03-01 NOTE — TELEPHONE ENCOUNTER
Called pt and informed her per [facility] orders. Per verbal from [facility] pt should not take any aspirin or ibuprofen/advil. Pt verbalized understanding.

## 2019-03-04 ENCOUNTER — TELEPHONE (OUTPATIENT)
Dept: ORTHOPEDICS CLINIC | Facility: CLINIC | Age: 59
End: 2019-03-04

## 2019-03-04 NOTE — TELEPHONE ENCOUNTER
Hospital will call patient with time of surgery. There is possibility that patient will be admitted and then go to rehab due to BLE surgery.

## 2019-03-04 NOTE — TELEPHONE ENCOUNTER
Pt concerned about where surgery will be and what time? Pt concerned about will she be inpatient and going to rehab after Has no help at home. Discussed she will be called tomorrow with time and location. Pt advise on in patient and rehab after.

## 2019-03-04 NOTE — TELEPHONE ENCOUNTER
Call to [facility] for surgery authoriztion.   Surgery 3-6-19  [facility]   Out patient  ORIF of right distal fubula  ORIF of left patellar tendon  [id number]   [id number]     Diagnosis code  S82.831A      R60.642 A  S[poke to representative Mindy Medina pre certi

## 2019-03-04 NOTE — TELEPHONE ENCOUNTER
Call back to [facility], No answer. Left voice message. REquesting call back.

## 2019-03-05 ENCOUNTER — TELEPHONE (OUTPATIENT)
Dept: ORTHOPEDICS CLINIC | Facility: CLINIC | Age: 59
End: 2019-03-05

## 2019-03-05 NOTE — TELEPHONE ENCOUNTER
Rec'd Short Term Disability Forms via fax to [facility] for Dr Addie Goodman to complete. Pt has not signed HIPPA or pd $25 fee. Scanned to [facility] and brought originals to [facility] @ [facility].

## 2019-03-06 ENCOUNTER — HOSPITAL ENCOUNTER (INPATIENT)
Facility: HOSPITAL | Age: 59
LOS: 5 days | Discharge: NURSING, REHAB OR LONG-TERM CARE | DRG: 493 | End: 2019-03-11
Attending: ORTHOPAEDIC SURGERY | Admitting: ORTHOPAEDIC SURGERY
Payer: COMMERCIAL

## 2019-03-06 ENCOUNTER — ANESTHESIA (OUTPATIENT)
Dept: SURGERY | Facility: HOSPITAL | Age: 59
DRG: 493 | End: 2019-03-06
Payer: COMMERCIAL

## 2019-03-06 ENCOUNTER — ANESTHESIA EVENT (OUTPATIENT)
Dept: SURGERY | Facility: HOSPITAL | Age: 59
DRG: 493 | End: 2019-03-06
Payer: COMMERCIAL

## 2019-03-06 ENCOUNTER — APPOINTMENT (OUTPATIENT)
Dept: ULTRASOUND IMAGING | Facility: HOSPITAL | Age: 59
DRG: 493 | End: 2019-03-06
Attending: ORTHOPAEDIC SURGERY
Payer: COMMERCIAL

## 2019-03-06 ENCOUNTER — APPOINTMENT (OUTPATIENT)
Dept: GENERAL RADIOLOGY | Facility: HOSPITAL | Age: 59
DRG: 493 | End: 2019-03-06
Attending: ORTHOPAEDIC SURGERY
Payer: COMMERCIAL

## 2019-03-06 DIAGNOSIS — S86.812A PATELLAR TENDON RUPTURE, LEFT, INITIAL ENCOUNTER: ICD-10-CM

## 2019-03-06 DIAGNOSIS — S82.831A DISPLACED FRACTURE OF DISTAL END OF RIGHT FIBULA: ICD-10-CM

## 2019-03-06 PROCEDURE — 99232 SBSQ HOSP IP/OBS MODERATE 35: CPT | Performed by: HOSPITALIST

## 2019-03-06 PROCEDURE — 76000 FLUOROSCOPY <1 HR PHYS/QHP: CPT | Performed by: ORTHOPAEDIC SURGERY

## 2019-03-06 PROCEDURE — 0LSR0ZZ REPOSITION LEFT KNEE TENDON, OPEN APPROACH: ICD-10-PCS | Performed by: ORTHOPAEDIC SURGERY

## 2019-03-06 PROCEDURE — 3E0T3BZ INTRODUCTION OF ANESTHETIC AGENT INTO PERIPHERAL NERVES AND PLEXI, PERCUTANEOUS APPROACH: ICD-10-PCS | Performed by: ORTHOPAEDIC SURGERY

## 2019-03-06 PROCEDURE — 93970 EXTREMITY STUDY: CPT | Performed by: ORTHOPAEDIC SURGERY

## 2019-03-06 PROCEDURE — BQ171ZZ FLUOROSCOPY OF RIGHT KNEE USING LOW OSMOLAR CONTRAST: ICD-10-PCS | Performed by: ORTHOPAEDIC SURGERY

## 2019-03-06 PROCEDURE — 0QSJ04Z REPOSITION RIGHT FIBULA WITH INTERNAL FIXATION DEVICE, OPEN APPROACH: ICD-10-PCS | Performed by: ORTHOPAEDIC SURGERY

## 2019-03-06 DEVICE — IMPLANTABLE DEVICE: Type: IMPLANTABLE DEVICE | Site: ANKLE | Status: FUNCTIONAL

## 2019-03-06 DEVICE — SCREW BN 2.7MM 2.1MM 12MM LCP: Type: IMPLANTABLE DEVICE | Site: ANKLE | Status: FUNCTIONAL

## 2019-03-06 DEVICE — SCREW BN 2.7MM 10MM LCP SS: Type: IMPLANTABLE DEVICE | Site: ANKLE | Status: FUNCTIONAL

## 2019-03-06 DEVICE — SCREW BN 2.7MM 2.1MM 14MM LCP: Type: IMPLANTABLE DEVICE | Site: ANKLE | Status: FUNCTIONAL

## 2019-03-06 DEVICE — SCREW BN 2.7MM 12MM DCP SS ST: Type: IMPLANTABLE DEVICE | Site: ANKLE | Status: FUNCTIONAL

## 2019-03-06 DEVICE — ANCHOR SUT 5.5MM 2 FT CRKSCR 2: Type: IMPLANTABLE DEVICE | Status: FUNCTIONAL

## 2019-03-06 RX ORDER — DEXAMETHASONE SODIUM PHOSPHATE 4 MG/ML
VIAL (ML) INJECTION AS NEEDED
Status: DISCONTINUED | OUTPATIENT
Start: 2019-03-06 | End: 2019-03-06 | Stop reason: SURG

## 2019-03-06 RX ORDER — OXYCODONE HYDROCHLORIDE 5 MG/1
5 TABLET ORAL EVERY 4 HOURS PRN
Status: ACTIVE | OUTPATIENT
Start: 2019-03-06 | End: 2019-03-07

## 2019-03-06 RX ORDER — MORPHINE SULFATE 4 MG/ML
2 INJECTION, SOLUTION INTRAMUSCULAR; INTRAVENOUS EVERY 2 HOUR PRN
Status: ACTIVE | OUTPATIENT
Start: 2019-03-06 | End: 2019-03-07

## 2019-03-06 RX ORDER — NALOXONE HYDROCHLORIDE 0.4 MG/ML
80 INJECTION, SOLUTION INTRAMUSCULAR; INTRAVENOUS; SUBCUTANEOUS AS NEEDED
Status: DISCONTINUED | OUTPATIENT
Start: 2019-03-06 | End: 2019-03-06 | Stop reason: HOSPADM

## 2019-03-06 RX ORDER — HYDROCODONE BITARTRATE AND ACETAMINOPHEN 5; 325 MG/1; MG/1
1 TABLET ORAL AS NEEDED
Status: DISCONTINUED | OUTPATIENT
Start: 2019-03-06 | End: 2019-03-06 | Stop reason: HOSPADM

## 2019-03-06 RX ORDER — ROCURONIUM BROMIDE 10 MG/ML
INJECTION, SOLUTION INTRAVENOUS AS NEEDED
Status: DISCONTINUED | OUTPATIENT
Start: 2019-03-06 | End: 2019-03-06 | Stop reason: SURG

## 2019-03-06 RX ORDER — MORPHINE SULFATE 4 MG/ML
6 INJECTION, SOLUTION INTRAMUSCULAR; INTRAVENOUS EVERY 2 HOUR PRN
Status: ACTIVE | OUTPATIENT
Start: 2019-03-06 | End: 2019-03-07

## 2019-03-06 RX ORDER — ENOXAPARIN SODIUM 100 MG/ML
40 INJECTION SUBCUTANEOUS DAILY
Status: DISCONTINUED | OUTPATIENT
Start: 2019-03-06 | End: 2019-03-06

## 2019-03-06 RX ORDER — HYDROMORPHONE HYDROCHLORIDE 1 MG/ML
0.5 INJECTION, SOLUTION INTRAMUSCULAR; INTRAVENOUS; SUBCUTANEOUS ONCE
Status: COMPLETED | OUTPATIENT
Start: 2019-03-06 | End: 2019-03-06

## 2019-03-06 RX ORDER — SODIUM CHLORIDE 0.9 % (FLUSH) 0.9 %
10 SYRINGE (ML) INJECTION AS NEEDED
Status: DISCONTINUED | OUTPATIENT
Start: 2019-03-06 | End: 2019-03-11

## 2019-03-06 RX ORDER — MORPHINE SULFATE 4 MG/ML
4 INJECTION, SOLUTION INTRAMUSCULAR; INTRAVENOUS EVERY 2 HOUR PRN
Status: DISCONTINUED | OUTPATIENT
Start: 2019-03-06 | End: 2019-03-11

## 2019-03-06 RX ORDER — ACETAMINOPHEN 325 MG/1
650 TABLET ORAL EVERY 6 HOURS PRN
Status: DISCONTINUED | OUTPATIENT
Start: 2019-03-06 | End: 2019-03-11

## 2019-03-06 RX ORDER — CEFAZOLIN SODIUM/WATER 2 G/20 ML
2 SYRINGE (ML) INTRAVENOUS ONCE
Status: DISCONTINUED | OUTPATIENT
Start: 2019-03-06 | End: 2019-03-06 | Stop reason: HOSPADM

## 2019-03-06 RX ORDER — GLYCOPYRROLATE 0.2 MG/ML
INJECTION INTRAMUSCULAR; INTRAVENOUS AS NEEDED
Status: DISCONTINUED | OUTPATIENT
Start: 2019-03-06 | End: 2019-03-06 | Stop reason: SURG

## 2019-03-06 RX ORDER — BISACODYL 10 MG
10 SUPPOSITORY, RECTAL RECTAL
Status: DISCONTINUED | OUTPATIENT
Start: 2019-03-06 | End: 2019-03-11

## 2019-03-06 RX ORDER — HYDROCODONE BITARTRATE AND ACETAMINOPHEN 5; 325 MG/1; MG/1
2 TABLET ORAL AS NEEDED
Status: DISCONTINUED | OUTPATIENT
Start: 2019-03-06 | End: 2019-03-06 | Stop reason: HOSPADM

## 2019-03-06 RX ORDER — SODIUM PHOSPHATE, DIBASIC AND SODIUM PHOSPHATE, MONOBASIC 7; 19 G/133ML; G/133ML
1 ENEMA RECTAL ONCE AS NEEDED
Status: DISCONTINUED | OUTPATIENT
Start: 2019-03-06 | End: 2019-03-11

## 2019-03-06 RX ORDER — ENOXAPARIN SODIUM 100 MG/ML
40 INJECTION SUBCUTANEOUS DAILY
Qty: 30 SYRINGE | Refills: 0 | Status: SHIPPED | OUTPATIENT
Start: 2019-03-06 | End: 2019-05-31

## 2019-03-06 RX ORDER — HYDROCODONE BITARTRATE AND ACETAMINOPHEN 10; 325 MG/1; MG/1
1 TABLET ORAL EVERY 6 HOURS PRN
Status: DISCONTINUED | OUTPATIENT
Start: 2019-03-06 | End: 2019-03-11

## 2019-03-06 RX ORDER — OXYCODONE HYDROCHLORIDE 5 MG/1
10 TABLET ORAL EVERY 4 HOURS PRN
Status: DISPENSED | OUTPATIENT
Start: 2019-03-06 | End: 2019-03-07

## 2019-03-06 RX ORDER — HALOPERIDOL 5 MG/ML
0.25 INJECTION INTRAMUSCULAR ONCE AS NEEDED
Status: DISCONTINUED | OUTPATIENT
Start: 2019-03-06 | End: 2019-03-06 | Stop reason: HOSPADM

## 2019-03-06 RX ORDER — MORPHINE SULFATE 4 MG/ML
4 INJECTION, SOLUTION INTRAMUSCULAR; INTRAVENOUS EVERY 2 HOUR PRN
Status: DISPENSED | OUTPATIENT
Start: 2019-03-06 | End: 2019-03-07

## 2019-03-06 RX ORDER — MORPHINE SULFATE 2 MG/ML
2 INJECTION, SOLUTION INTRAMUSCULAR; INTRAVENOUS EVERY 2 HOUR PRN
Status: DISCONTINUED | OUTPATIENT
Start: 2019-03-06 | End: 2019-03-11

## 2019-03-06 RX ORDER — MORPHINE SULFATE 15 MG/1
15 TABLET ORAL EVERY 4 HOURS PRN
Status: ACTIVE | OUTPATIENT
Start: 2019-03-06 | End: 2019-03-07

## 2019-03-06 RX ORDER — ACETAMINOPHEN 500 MG
1000 TABLET ORAL ONCE
Status: COMPLETED | OUTPATIENT
Start: 2019-03-06 | End: 2019-03-06

## 2019-03-06 RX ORDER — ENOXAPARIN SODIUM 100 MG/ML
30 INJECTION SUBCUTANEOUS EVERY 12 HOURS
Status: DISCONTINUED | OUTPATIENT
Start: 2019-03-06 | End: 2019-03-11

## 2019-03-06 RX ORDER — MORPHINE SULFATE 2 MG/ML
1 INJECTION, SOLUTION INTRAMUSCULAR; INTRAVENOUS EVERY 2 HOUR PRN
Status: DISCONTINUED | OUTPATIENT
Start: 2019-03-06 | End: 2019-03-11

## 2019-03-06 RX ORDER — HYDROMORPHONE HYDROCHLORIDE 1 MG/ML
0.5 INJECTION, SOLUTION INTRAMUSCULAR; INTRAVENOUS; SUBCUTANEOUS EVERY 5 MIN PRN
Status: COMPLETED | OUTPATIENT
Start: 2019-03-06 | End: 2019-03-06

## 2019-03-06 RX ORDER — ONDANSETRON 2 MG/ML
4 INJECTION INTRAMUSCULAR; INTRAVENOUS ONCE AS NEEDED
Status: DISCONTINUED | OUTPATIENT
Start: 2019-03-06 | End: 2019-03-06 | Stop reason: HOSPADM

## 2019-03-06 RX ORDER — LIDOCAINE HYDROCHLORIDE 10 MG/ML
INJECTION, SOLUTION EPIDURAL; INFILTRATION; INTRACAUDAL; PERINEURAL AS NEEDED
Status: DISCONTINUED | OUTPATIENT
Start: 2019-03-06 | End: 2019-03-06 | Stop reason: SURG

## 2019-03-06 RX ORDER — MORPHINE SULFATE 4 MG/ML
2 INJECTION, SOLUTION INTRAMUSCULAR; INTRAVENOUS EVERY 10 MIN PRN
Status: DISCONTINUED | OUTPATIENT
Start: 2019-03-06 | End: 2019-03-06 | Stop reason: HOSPADM

## 2019-03-06 RX ORDER — ONDANSETRON 2 MG/ML
INJECTION INTRAMUSCULAR; INTRAVENOUS AS NEEDED
Status: DISCONTINUED | OUTPATIENT
Start: 2019-03-06 | End: 2019-03-06 | Stop reason: SURG

## 2019-03-06 RX ORDER — ACETAMINOPHEN 500 MG
1000 TABLET ORAL EVERY 8 HOURS
Status: DISPENSED | OUTPATIENT
Start: 2019-03-06 | End: 2019-03-07

## 2019-03-06 RX ORDER — METOCLOPRAMIDE 10 MG/1
10 TABLET ORAL ONCE
Status: COMPLETED | OUTPATIENT
Start: 2019-03-06 | End: 2019-03-06

## 2019-03-06 RX ORDER — METOCLOPRAMIDE HYDROCHLORIDE 5 MG/ML
10 INJECTION INTRAMUSCULAR; INTRAVENOUS EVERY 6 HOURS PRN
Status: DISCONTINUED | OUTPATIENT
Start: 2019-03-06 | End: 2019-03-11

## 2019-03-06 RX ORDER — FAMOTIDINE 20 MG/1
20 TABLET ORAL ONCE
Status: COMPLETED | OUTPATIENT
Start: 2019-03-06 | End: 2019-03-06

## 2019-03-06 RX ORDER — ONDANSETRON 2 MG/ML
4 INJECTION INTRAMUSCULAR; INTRAVENOUS EVERY 6 HOURS PRN
Status: DISCONTINUED | OUTPATIENT
Start: 2019-03-06 | End: 2019-03-11

## 2019-03-06 RX ORDER — SODIUM CHLORIDE, SODIUM LACTATE, POTASSIUM CHLORIDE, CALCIUM CHLORIDE 600; 310; 30; 20 MG/100ML; MG/100ML; MG/100ML; MG/100ML
INJECTION, SOLUTION INTRAVENOUS
Status: COMPLETED
Start: 2019-03-06 | End: 2019-03-06

## 2019-03-06 RX ORDER — SODIUM CHLORIDE, SODIUM LACTATE, POTASSIUM CHLORIDE, CALCIUM CHLORIDE 600; 310; 30; 20 MG/100ML; MG/100ML; MG/100ML; MG/100ML
INJECTION, SOLUTION INTRAVENOUS CONTINUOUS
Status: DISCONTINUED | OUTPATIENT
Start: 2019-03-06 | End: 2019-03-06 | Stop reason: HOSPADM

## 2019-03-06 RX ORDER — ROPIVACAINE HYDROCHLORIDE 5 MG/ML
INJECTION, SOLUTION EPIDURAL; INFILTRATION; PERINEURAL
Status: COMPLETED | OUTPATIENT
Start: 2019-03-06 | End: 2019-03-06

## 2019-03-06 RX ORDER — MIDAZOLAM HYDROCHLORIDE 1 MG/ML
INJECTION INTRAMUSCULAR; INTRAVENOUS
Status: COMPLETED | OUTPATIENT
Start: 2019-03-06 | End: 2019-03-06

## 2019-03-06 RX ORDER — HYDROCODONE BITARTRATE AND ACETAMINOPHEN 10; 325 MG/1; MG/1
2 TABLET ORAL EVERY 6 HOURS PRN
Status: DISCONTINUED | OUTPATIENT
Start: 2019-03-06 | End: 2019-03-11

## 2019-03-06 RX ORDER — DEXAMETHASONE SODIUM PHOSPHATE 10 MG/ML
INJECTION, SOLUTION INTRAMUSCULAR; INTRAVENOUS
Status: COMPLETED | OUTPATIENT
Start: 2019-03-06 | End: 2019-03-06

## 2019-03-06 RX ORDER — MORPHINE SULFATE 4 MG/ML
4 INJECTION, SOLUTION INTRAMUSCULAR; INTRAVENOUS EVERY 10 MIN PRN
Status: DISCONTINUED | OUTPATIENT
Start: 2019-03-06 | End: 2019-03-06 | Stop reason: HOSPADM

## 2019-03-06 RX ORDER — SODIUM CHLORIDE, SODIUM LACTATE, POTASSIUM CHLORIDE, CALCIUM CHLORIDE 600; 310; 30; 20 MG/100ML; MG/100ML; MG/100ML; MG/100ML
INJECTION, SOLUTION INTRAVENOUS CONTINUOUS
Status: DISCONTINUED | OUTPATIENT
Start: 2019-03-06 | End: 2019-03-11

## 2019-03-06 RX ORDER — HYDROCODONE BITARTRATE AND ACETAMINOPHEN 10; 325 MG/1; MG/1
1-2 TABLET ORAL EVERY 6 HOURS PRN
Qty: 40 TABLET | Refills: 0 | Status: SHIPPED | OUTPATIENT
Start: 2019-03-06 | End: 2019-05-31

## 2019-03-06 RX ORDER — MORPHINE SULFATE 10 MG/ML
6 INJECTION, SOLUTION INTRAMUSCULAR; INTRAVENOUS EVERY 10 MIN PRN
Status: DISCONTINUED | OUTPATIENT
Start: 2019-03-06 | End: 2019-03-06 | Stop reason: HOSPADM

## 2019-03-06 RX ORDER — POLYETHYLENE GLYCOL 3350 17 G/17G
17 POWDER, FOR SOLUTION ORAL DAILY PRN
Status: DISCONTINUED | OUTPATIENT
Start: 2019-03-06 | End: 2019-03-11

## 2019-03-06 RX ORDER — DOCUSATE SODIUM 100 MG/1
100 CAPSULE, LIQUID FILLED ORAL 2 TIMES DAILY
Status: DISCONTINUED | OUTPATIENT
Start: 2019-03-06 | End: 2019-03-11

## 2019-03-06 RX ADMIN — ONDANSETRON 4 MG: 2 INJECTION INTRAMUSCULAR; INTRAVENOUS at 14:51:00

## 2019-03-06 RX ADMIN — ROCURONIUM BROMIDE 5 MG: 10 INJECTION, SOLUTION INTRAVENOUS at 12:14:00

## 2019-03-06 RX ADMIN — ROPIVACAINE HYDROCHLORIDE 25 ML: 5 INJECTION, SOLUTION EPIDURAL; INFILTRATION; PERINEURAL at 11:18:00

## 2019-03-06 RX ADMIN — ROPIVACAINE HYDROCHLORIDE 30 ML: 5 INJECTION, SOLUTION EPIDURAL; INFILTRATION; PERINEURAL at 11:15:00

## 2019-03-06 RX ADMIN — ROCURONIUM BROMIDE 10 MG: 10 INJECTION, SOLUTION INTRAVENOUS at 12:44:00

## 2019-03-06 RX ADMIN — DEXAMETHASONE SODIUM PHOSPHATE 5 MG: 10 INJECTION, SOLUTION INTRAMUSCULAR; INTRAVENOUS at 11:15:00

## 2019-03-06 RX ADMIN — GLYCOPYRROLATE 0.2 MG: 0.2 INJECTION INTRAMUSCULAR; INTRAVENOUS at 12:13:00

## 2019-03-06 RX ADMIN — MIDAZOLAM HYDROCHLORIDE 2 MG: 1 INJECTION INTRAMUSCULAR; INTRAVENOUS at 11:18:00

## 2019-03-06 RX ADMIN — SODIUM CHLORIDE, SODIUM LACTATE, POTASSIUM CHLORIDE, CALCIUM CHLORIDE: 600; 310; 30; 20 INJECTION, SOLUTION INTRAVENOUS at 12:02:00

## 2019-03-06 RX ADMIN — MIDAZOLAM HYDROCHLORIDE 2 MG: 1 INJECTION INTRAMUSCULAR; INTRAVENOUS at 11:15:00

## 2019-03-06 RX ADMIN — SODIUM CHLORIDE, SODIUM LACTATE, POTASSIUM CHLORIDE, CALCIUM CHLORIDE: 600; 310; 30; 20 INJECTION, SOLUTION INTRAVENOUS at 15:19:00

## 2019-03-06 RX ADMIN — LIDOCAINE HYDROCHLORIDE 50 MG: 10 INJECTION, SOLUTION EPIDURAL; INFILTRATION; INTRACAUDAL; PERINEURAL at 12:14:00

## 2019-03-06 RX ADMIN — SODIUM CHLORIDE, SODIUM LACTATE, POTASSIUM CHLORIDE, CALCIUM CHLORIDE: 600; 310; 30; 20 INJECTION, SOLUTION INTRAVENOUS at 13:42:00

## 2019-03-06 RX ADMIN — DEXAMETHASONE SODIUM PHOSPHATE 4 MG: 4 MG/ML VIAL (ML) INJECTION at 12:23:00

## 2019-03-06 RX ADMIN — ROCURONIUM BROMIDE 10 MG: 10 INJECTION, SOLUTION INTRAVENOUS at 12:28:00

## 2019-03-06 NOTE — ANESTHESIA PREPROCEDURE EVALUATION
Anesthesia PreOp Note    HPI:     Jeane Godwin is a 60year old female who presents for preoperative consultation requested by: Librado Marcial MD    Date of Surgery: 3/6/2019    Procedure(s):  ORIF FIBULA  Indication: Displaced fracture of distal end 2/26/2019   Multiple Vitamin (MULTIVITAMINS) Oral Cap Take  by mouth.  Disp:  Rfl:  2/26/2019       Current Facility-Administered Medications Ordered in Epic:  lactated ringers infusion  Intravenous Continuous Vanesa Chung MD   acetaminophen [facility] member of club or organization: Not on file        Attends meetings of clubs or organizations: Not on file        Relationship status: Not on file      Intimate partner violence:        Fear of current or ex partner: Not on file        Emotionally abused: Management: IV analgesics, Saphenous block and Popliteal block      I have informed Hennie Frankel and/or legal guardian or family member of the nature of the anesthetic plan, benefits, risks including possible dental damage if relevant, major complications,

## 2019-03-06 NOTE — BRIEF OP NOTE
Pre-Operative Diagnosis: Displaced fracture of distal end of right fibula [S82.831A]  Patellar tendon rupture, left, initial encounter [F09.851P]     Post-Operative Diagnosis: Displaced fracture of distal end of right fibula [N47.828E] Patellar tendon rupt

## 2019-03-06 NOTE — ANESTHESIA PROCEDURE NOTES
Peripheral Block    Anesthesiologist:  Ronald Petty MD  Patient Location:  PACU  Start Time:  3/6/2019 10:49 AM  End Time:  3/6/2019 11:04 AM  Site Identification: ultrasound guided, real time ultrasound guided and IMAGE STORED AND RETRIEVABLE    Reas

## 2019-03-06 NOTE — INTERVAL H&P NOTE
Pre-op Diagnosis: Displaced fracture of distal end of right fibula [S82.831A]  Patellar tendon rupture, left, initial encounter [T19.619V]    The above referenced H&P was reviewed by Mark Bruner MD on 3/6/2019, the patient was examined and no signi

## 2019-03-06 NOTE — ANESTHESIA PROCEDURE NOTES
Peripheral Block  Date/Time: 3/6/2019 11:18 AM    Anesthesiologist:  Telly Garcia MD  Performed by:   Anesthesiologist  Patient Location:  PACU  Start Time:  3/6/2019 11:08 AM  End Time:  3/6/2019 11:14 AM  Site Identification: ultrasound guided, real

## 2019-03-06 NOTE — ANESTHESIA POSTPROCEDURE EVALUATION
Patient: Ulises Short    Procedure Summary     Date:  03/06/19 Room / Location:  [address] [place] [address] MAIN OR 08 / [address] [facility] MAIN OR    Anesthesia Start:  3285 Anesthesia Stop:  7003    Procedures:       ORIF FIBULA (Right Ankle)      PATELLA TENDON REPAIR (Left Knee) Diagnosis:

## 2019-03-06 NOTE — PROGRESS NOTES
[facility] - [facility]    Progress Note    Daxa France Patient Status:  Hospital Outpatient Surgery    3/30/1960 MRN [id number]   Location [address] UNIT Attending Jacqui Farmer MD   Hosp Day # 0 SILVIA Rhodes PROPHYLAXIS, PT/OT. Morbid obesity with BMI of 40.0-44.9, adult ([facility])  MONITOR RESPIRATORY AND HEMODYNAMIC STATUS, POSSIBLE UNDIAGNOSED BO.              Results:     Lab Results   Component Value Date    WBC 6.4 01/17/2015    HGB 13.1 01/17/2015    H

## 2019-03-06 NOTE — OPERATIVE REPORT
[facility]    PATIENT'S NAME: Rob Saunders   ATTENDING PHYSICIAN: Adair Youssef MD   OPERATING PHYSICIAN: Adair Youssef MD   PATIENT ACCOUNT#:   [id number]    LOCATION:  [facility] [address] PACU [address] [place] [address]  MEDICAL RECORD #:   [id number]       DATE OF She was then taken back to the operating room and intubated successfully. The left lower extremity was prepped and draped in standard surgical fashion. Time-out was performed.   A well-padded tourniquet was placed on the patient's left proximal thigh prio patient had a knee immobilizer placed and the drapes were taken down. The right lower extremity was then prepped and draped in standard surgical fashion. Perioperative antibiotics were infused. Confirmation of identity and laterality took place.   At 15:55:40  [facility] [id number]  GD/

## 2019-03-06 NOTE — ANESTHESIA PROCEDURE NOTES
ANESTHESIA INTUBATION  Date/Time: 3/6/2019 12:16 PM  Urgency: elective    Airway not difficult    General Information and Staff    Patient location during procedure: OR  Anesthesiologist: Neetu Garcia MD  Resident/CRNA: Nieves Kim CRNA  Performed: [facility]

## 2019-03-07 ENCOUNTER — APPOINTMENT (OUTPATIENT)
Dept: CV DIAGNOSTICS | Facility: HOSPITAL | Age: 59
DRG: 493 | End: 2019-03-07
Attending: HOSPITALIST
Payer: COMMERCIAL

## 2019-03-07 ENCOUNTER — APPOINTMENT (OUTPATIENT)
Dept: CT IMAGING | Facility: HOSPITAL | Age: 59
DRG: 493 | End: 2019-03-07
Attending: HOSPITALIST
Payer: COMMERCIAL

## 2019-03-07 LAB
ANION GAP SERPL CALC-SCNC: 4 MMOL/L (ref 0–18)
BASOPHILS # BLD AUTO: 0.01 X10(3) UL (ref 0–0.2)
BASOPHILS NFR BLD AUTO: 0.1 %
BUN BLD-MCNC: 12 MG/DL (ref 7–18)
BUN/CREAT SERPL: 14.6 (ref 10–20)
CALCIUM BLD-MCNC: 8.9 MG/DL (ref 8.5–10.1)
CHLORIDE SERPL-SCNC: 108 MMOL/L (ref 98–107)
CO2 SERPL-SCNC: 27 MMOL/L (ref 21–32)
CREAT BLD-MCNC: 0.82 MG/DL (ref 0.55–1.02)
D DIMER PPP FEU-MCNC: 1.96 MCG/ML (ref ?–0.59)
DEPRECATED RDW RBC AUTO: 45.2 FL (ref 35.1–46.3)
EOSINOPHIL # BLD AUTO: 0 X10(3) UL (ref 0–0.7)
EOSINOPHIL NFR BLD AUTO: 0 %
ERYTHROCYTE [DISTWIDTH] IN BLOOD BY AUTOMATED COUNT: 14.1 % (ref 11–15)
GLUCOSE BLD-MCNC: 100 MG/DL (ref 70–99)
HAV IGM SER QL: 2.5 MG/DL (ref 1.6–2.6)
HCT VFR BLD AUTO: 36.8 % (ref 35–48)
HGB BLD-MCNC: 11.5 G/DL (ref 12–16)
IMM GRANULOCYTES # BLD AUTO: 0.07 X10(3) UL (ref 0–1)
IMM GRANULOCYTES NFR BLD: 0.6 %
LYMPHOCYTES # BLD AUTO: 1.7 X10(3) UL (ref 1–4)
LYMPHOCYTES NFR BLD AUTO: 13.5 %
MCH RBC QN AUTO: 27.1 PG (ref 26–34)
MCHC RBC AUTO-ENTMCNC: 31.3 G/DL (ref 31–37)
MCV RBC AUTO: 86.8 FL (ref 80–100)
MONOCYTES # BLD AUTO: 1.09 X10(3) UL (ref 0.1–1)
MONOCYTES NFR BLD AUTO: 8.6 %
NEUTROPHILS # BLD AUTO: 9.75 X10 (3) UL (ref 1.5–7.7)
NEUTROPHILS # BLD AUTO: 9.75 X10(3) UL (ref 1.5–7.7)
NEUTROPHILS NFR BLD AUTO: 77.2 %
OSMOLALITY SERPL CALC.SUM OF ELEC: 288 MOSM/KG (ref 275–295)
PLATELET # BLD AUTO: 409 10(3)UL (ref 150–450)
POTASSIUM SERPL-SCNC: 4 MMOL/L (ref 3.5–5.1)
RBC # BLD AUTO: 4.24 X10(6)UL (ref 3.8–5.3)
SODIUM SERPL-SCNC: 139 MMOL/L (ref 136–145)
TROPONIN I SERPL-MCNC: <0.045 NG/ML (ref ?–0.04)
WBC # BLD AUTO: 12.6 X10(3) UL (ref 4–11)

## 2019-03-07 PROCEDURE — 93306 TTE W/DOPPLER COMPLETE: CPT | Performed by: HOSPITALIST

## 2019-03-07 PROCEDURE — 99233 SBSQ HOSP IP/OBS HIGH 50: CPT | Performed by: HOSPITALIST

## 2019-03-07 PROCEDURE — 71260 CT THORAX DX C+: CPT | Performed by: HOSPITALIST

## 2019-03-07 RX ORDER — NITROGLYCERIN 0.4 MG/1
TABLET SUBLINGUAL
Status: COMPLETED
Start: 2019-03-07 | End: 2019-03-07

## 2019-03-07 RX ORDER — FAMOTIDINE 20 MG/1
20 TABLET ORAL DAILY
Status: DISCONTINUED | OUTPATIENT
Start: 2019-03-07 | End: 2019-03-11

## 2019-03-07 RX ORDER — NITROGLYCERIN 0.4 MG/1
0.4 TABLET SUBLINGUAL EVERY 5 MIN PRN
Status: DISCONTINUED | OUTPATIENT
Start: 2019-03-07 | End: 2019-03-11

## 2019-03-07 RX ORDER — ASPIRIN 81 MG/1
81 TABLET ORAL DAILY
Status: DISCONTINUED | OUTPATIENT
Start: 2019-03-07 | End: 2019-03-11

## 2019-03-07 NOTE — CM/SW NOTE
Per OT/PT, patient may benefit from acute rehab-SW will follow up accordingly, pt is in the procedure and off the floor at this time. PMR consult needed.     Mack Guido LMSW., [id number]

## 2019-03-07 NOTE — PLAN OF CARE
RRT    *See RRT Documentation Record*    Reason the RRT was called: Chest pain  Assessment of patient leading up to RRT: /71, HR 74, 97% SpO2 - 2L  Interventions/Testing: Stat EKG, sublingual nitroglycerin, labs: Troponin, d-dimer, BMP, CBC,   Gladys

## 2019-03-07 NOTE — CONSULTS
Patient with multiple surgeries. Right ankle doing well no pain. Block resolving. Left knee painful. 4/10. She's presently on PO meds. No anesthetic complications noted. PO pain meds taken as needed.

## 2019-03-07 NOTE — OCCUPATIONAL THERAPY NOTE
OCCUPATIONAL THERAPY EVALUATION - INPATIENT      Room Number: 404/404-A  Evaluation Date: 3/7/2019  Type of Evaluation: Initial  Presenting Problem: ( RT fibula fx, s/p ankle ORIF, LT patella tendon repair)    Physician Order: IP Consult to Occupational Th PLAN  OT Treatment Plan: ADL training;Functional transfer training;Patient/Family education;Patient/Family training;Equipment eval/education; Compensatory technique education       OCCUPATIONAL THERAPY MEDICAL/SOCIAL HISTORY     Problem List   Principal Inpatient Daily Activity Short Form  How much help from another person does the patient currently need…  -   Putting on and taking off regular lower body clothing?: A Lot  -   Bathing (including washing, rinsing, drying)?: A Lot  -   Toileting, which inclu

## 2019-03-07 NOTE — PROGRESS NOTES
[facility]  Progress Note    Genice Freeze Patient Status:  Inpatient    3/30/1960 MRN [id number]   Location [facility] 4W/SW/SE Attending Mckayla Samaniego MD   Hosp Day # 1 PCP Vasyl Maier DO     SUBJECTIVE:  INTERVAL HISTORY: POD 1 s/ 409.0      No results for input(s): PTP, INR in the last 168 hours. ASSESSMENT/PLAN:  POD 1 s/p R ankle ORIF and L patella tendon repair   1. Continue pain management - Norco script in chart  2.  Continue DVT prophylaxis - Lovenox 40mg daily, script in

## 2019-03-07 NOTE — CM/SW NOTE
EDILIA met with the pt. At bedside. The pt. Lives alone in a 1st floor apartment of a 2 flat building. There are 9 steps to enter the house. The pt. Reports being independent prior to admission with adls, ambulation, working and driving. The pt.  Has suppor

## 2019-03-07 NOTE — PAYOR COMM NOTE
--------------  ADMISSION REVIEW     Payor: [facility] PPO  Subscriber #:  [id number]  Authorization Number: [id number]    Admit date: 3/6/19  Admit time: 1815       Admitting Physician: Annel Jeffrey MD  Attending Physician:  Estela Tinoco MD  [facility] and intubated successfully. The left lower extremity was prepped and draped in standard surgical fashion. Time-out was performed. A well-padded tourniquet was placed on the patient's left proximal thigh prior to prepping and draping the lower extremity. drapes were taken down.       The right lower extremity was then prepped and draped in standard surgical fashion. Perioperative antibiotics were infused. Confirmation of identity and laterality took place. At this time, the tourniquet was insufflated. (TYLENOL EXTRA STRENGTH) tab 1,000 mg     Date Action Dose Route User    3/7/2019 0723 Given 1000 mg Oral Rah De La Paz RN    3/7/2019 0038 Given 1000 mg Oral Rah De La Paz RN      aspirin EC tab 81 mg     Date Action Dose Route User    3/7/2019 2163 Action Dose Route User    3/6/2019 1213 Given 0.2 mg Intravenous Cleine Corado CRNA      HYDROmorphone HCl (DILAUDID) 1 MG/ML injection 0.5 mg     Date Action Dose Route User    3/6/2019 1610 Given 0.5 mg Intravenous Jalen Mclean RN      HYDROmorphone HCl (OXY-IR) cap/tab 10 mg     Date Action Dose Route User    3/7/2019 1015 Given 10 mg Oral Claudean Gaudy, RN    3/6/2019 2247 Given 10 mg Oral Velma Simpson RN      propofol (DIPRIVAN) injection     Date Action Dose Route User    3/6/2019 1214 Gi surgery postop day 1     30 min with pt   hospitalist to follow up on the case   D/w Hospitalist           Electronically signed by Robina Lemons MD at 3/7/2019  7:01 AM

## 2019-03-07 NOTE — PHYSICAL THERAPY NOTE
PHYSICAL THERAPY EVALUATION - INPATIENT     Room Number: 404/404-A  Evaluation Date: 3/7/2019  Type of Evaluation: Initial   Physician Order: PT Eval and Treat    Presenting Problem: s/p L patella tendon repair & R ankle ORIF  Reason for Therapy: Mobility be difficult at this time. The patient's Approx Degree of Impairment: 76.75% has been calculated based on documentation in the [facility] '6 clicks' Inpatient Basic Mobility Short Form.   Research supports that patients with this level of impairment may elan 2008    Breast reduction to reduce back pain       HOME SITUATION  Type of Home: Apartment   Home Layout: One level  Stairs to Enter : (8 stairs + platform step)     Stairs to Bedroom: 0       Lives With: Alone  Drives: Yes          Prior Level of Indepen 2    Exercise/Education Provided:  Bed mobility  Transfer training    Patient End of Session: In bed;Needs met;Call light within reach;RN aware of session/findings; All patient questions and concerns addressed; Alarm set; Discussed recommendations with case m

## 2019-03-07 NOTE — PROGRESS NOTES
[facility]    Progress Note    Kelly Barrios Patient Status:  Hospital Outpatient Surgery    3/30/1960 MRN [id number]   Location [address] UNIT Attending Tobin Mcmullen MD   [facility] Day # 1 PCP Duane Tee TENDON  -/sp BILATERAL NERVE BLOCK  - PAIN CONTROL  - DVT PROPHYLAXIS--> sq lovenox  - PT/OT. Chest Pain  -s/p rrt in am  -d.  Dimer elevated  -ct scan chest neg for PE but notable for cardiomegaly and fluid overload  -plan echo    Morbid obesity with B volume status of the patient and/or CHF. 3.  Small hiatal hernia.   Dictated by (CST): Marito Burciaga MD on 3/07/2019 at 13:49     Approved by (CST): Marito Burciaga MD on 3/07/2019 at 13:53          Ekg 12-lead    Result Date: 3/7/2019  ECG Report  Interpretati

## 2019-03-07 NOTE — PROGRESS NOTES
Dr Maritza Cazares / on call - ICU   Responded to RRT for chest pain  65-year-old female underwent right ankle surgery yesterday on 3/6/2019 with ORIF to the fibula  Patient with chest tightness in the left side of her chest  No radiation  Pain started earlier few

## 2019-03-08 ENCOUNTER — TELEPHONE (OUTPATIENT)
Dept: ORTHOPEDICS CLINIC | Facility: CLINIC | Age: 59
End: 2019-03-08

## 2019-03-08 LAB
ANION GAP SERPL CALC-SCNC: 4 MMOL/L (ref 0–18)
BASOPHILS # BLD AUTO: 0.03 X10(3) UL (ref 0–0.2)
BASOPHILS NFR BLD AUTO: 0.3 %
BUN BLD-MCNC: 15 MG/DL (ref 7–18)
BUN/CREAT SERPL: 20.8 (ref 10–20)
CALCIUM BLD-MCNC: 8.6 MG/DL (ref 8.5–10.1)
CHLORIDE SERPL-SCNC: 109 MMOL/L (ref 98–107)
CO2 SERPL-SCNC: 29 MMOL/L (ref 21–32)
CREAT BLD-MCNC: 0.72 MG/DL (ref 0.55–1.02)
DEPRECATED RDW RBC AUTO: 46.1 FL (ref 35.1–46.3)
EOSINOPHIL # BLD AUTO: 0.16 X10(3) UL (ref 0–0.7)
EOSINOPHIL NFR BLD AUTO: 1.6 %
ERYTHROCYTE [DISTWIDTH] IN BLOOD BY AUTOMATED COUNT: 14.4 % (ref 11–15)
GLUCOSE BLD-MCNC: 94 MG/DL (ref 70–99)
HCT VFR BLD AUTO: 34.5 % (ref 35–48)
HGB BLD-MCNC: 10.8 G/DL (ref 12–16)
IMM GRANULOCYTES # BLD AUTO: 0.04 X10(3) UL (ref 0–1)
IMM GRANULOCYTES NFR BLD: 0.4 %
LYMPHOCYTES # BLD AUTO: 3.2 X10(3) UL (ref 1–4)
LYMPHOCYTES NFR BLD AUTO: 32 %
MCH RBC QN AUTO: 27.2 PG (ref 26–34)
MCHC RBC AUTO-ENTMCNC: 31.3 G/DL (ref 31–37)
MCV RBC AUTO: 86.9 FL (ref 80–100)
MONOCYTES # BLD AUTO: 0.9 X10(3) UL (ref 0.1–1)
MONOCYTES NFR BLD AUTO: 9 %
NEUTROPHILS # BLD AUTO: 5.67 X10 (3) UL (ref 1.5–7.7)
NEUTROPHILS # BLD AUTO: 5.67 X10(3) UL (ref 1.5–7.7)
NEUTROPHILS NFR BLD AUTO: 56.7 %
OSMOLALITY SERPL CALC.SUM OF ELEC: 295 MOSM/KG (ref 275–295)
PLATELET # BLD AUTO: 398 10(3)UL (ref 150–450)
POTASSIUM SERPL-SCNC: 4 MMOL/L (ref 3.5–5.1)
RBC # BLD AUTO: 3.97 X10(6)UL (ref 3.8–5.3)
SODIUM SERPL-SCNC: 142 MMOL/L (ref 136–145)
WBC # BLD AUTO: 10 X10(3) UL (ref 4–11)

## 2019-03-08 PROCEDURE — 99232 SBSQ HOSP IP/OBS MODERATE 35: CPT | Performed by: HOSPITALIST

## 2019-03-08 NOTE — TELEPHONE ENCOUNTER
Received fax to Ortho dept. FMLA docuements to be completed for her son to care for her. No HOLLIE or fee paid for these forms. Please contact patient if necessary. Original documents will be taken to HOLLIE dept in 2nd floor [facility].

## 2019-03-08 NOTE — PROGRESS NOTES
[facility]    Progress Note    Eran Mora Patient Status:  Inpatient    3/30/1960 MRN [id number]   Location [facility] 4W/SW/SE Attending Xena Lazcano MD   [address] Day # 2 PCP Kelly Isbell DO        Subjective:   Eran Mora 3/6/2019  CONCLUSION:  1. Fluoroscopic guidance utilized during open reduction internal fixation procedure of minimally comminuted distal right fibular fracture.   Satisfactory anatomic reduction    Dictated by (CST): Leah Krause MD on 3/06/2019 at

## 2019-03-08 NOTE — PHYSICAL THERAPY NOTE
PHYSICAL THERAPY TREATMENT NOTE - INPATIENT     Room Number: 904/792-P       Presenting Problem: s/p L patella tendon repair & R ankle ORIF    Problem List  Principal Problem:    Displaced fracture of distal end of right fibula  Active Problems:     Morbid lower extremity;R lower extremity        R Lower Extremity: Non-Weight Bearing  L Lower Extremity: (WBAT LLE with immobilizer on)    PAIN ASSESSMENT   Ratin  Location: L knee(6/10 R ankle)  Management Techniques: Activity promotion; Body mechanics; Relax assistance level: supervision with walker - rolling     Goal #2  Current Status Sit to stand w/o AD Max A x2   Goal #3 Patient is able to demonstrate transfers EOB to/from Chair/Wheelchair at assistance level: supervision with walker - rolling   Goal #3

## 2019-03-08 NOTE — PROGRESS NOTES
[facility] - [facility]    Progress Note    Daxa France Patient Status:  Hospital Outpatient Surgery    3/30/1960 MRN [id number]   Location [address] UNIT Attending Elias Kapoor MD     Hosp Day # 2 PCP [place] PROPHYLAXIS--> sq lovenox  - PT/OT. - plan acute rehab    Chest Pain  -s/p rrt in am  -d. Dimer elevated  -ct scan chest neg for PE but notable for cardiomegaly and fluid overload  -plan echo--> wnl  -no further w/u    Morbid obesity with BMI of 40.0-44.

## 2019-03-08 NOTE — CONSULTS
Seen and examined. Consult dictated  Recommend acute inpatient rehab.   Pt request SRAL    Thank you    Angela Navarrete MD

## 2019-03-08 NOTE — CONSULTS
[facility]    PATIENT'S NAME: Sivakumar Kilpatrick PHYSICIAN: Susan Boland MD   CONSULTING PHYSICIAN: Graeme Cotto MD   PATIENT ACCOUNT#:   [id number]    LOCATION:  [address] [place] [address] RECORD #:   [id number]       DATE OF BIRTH:  [birth year] under good control today. Her last dosage of oral pain medications was about 6 hours ago, and she has been comfortable. She has been participating in therapies. Due to weightbearing precaution, she was requiring additional assist for her sessions.   She to a knee immobilizer and wrapping in place. PSYCHIATRIC:  Patient is alert and oriented x4. She has a bright affect. She answers questions appropriately. MUSCULOSKELETAL:  Full passive range of motion of bilateral upper extremities.   Tone is normal. Rehab potential is excellent. Estimated length of stay for return to home environment approximately 10 days. The patient appears to have good family support. Advanced directives are reviewed. Patient is a FULL CODE. Heparin for DVT prophylaxis.      Pauline Dorsey

## 2019-03-08 NOTE — OCCUPATIONAL THERAPY NOTE
OCCUPATIONAL THERAPY TREATMENT NOTE - INPATIENT    Room Number: 920/361-C         Presenting Problem: ( RT fibula fx, s/p ankle ORIF, LT patella tendon repair)     Problem List  Principal Problem:    Displaced fracture of distal end of right fibula  Active (LT knee)  Management Techniques: Relaxation;Repositioning       ACTIVITIES OF DAILY LIVING ASSESSMENT  AM-PAC ‘6-Clicks’ Inpatient Daily Activity Short Form  How much help from another person does the patient currently need…  -   Putting on and taking off address the above deficits, maximizing patient's ability to return to prior level of function. Pt seen in cooperation with PT after nurse approves pt participation. Pt received supine in bed. Provide mod A for bed mobility.  Provide mod A x2 upon initial s OR   • REDUCTION OF LARGE BREAST  2008    Breast reduction to reduce back pain       HOME SITUATION  Type of Home: [address]: One level  Lives With: Alone    Toilet and Equipment: Standard height toilet     Other Equipment: (R/W)    Occupation max A      Patient End of Session: Up in chair;Needs met;Call light within reach    OT Goals  Patient self-stated goal is: to return to PLOF     Patient will complete LE dressing with min A  Comment: progressing    Patient will complete toilet transfer wit

## 2019-03-08 NOTE — CM/SW NOTE
SW followed up with the patient re: acute vs sub-acute facility choice for rehab. Patient would like referral to be sent to [facility]. PMR consult requested.     ADDENDUM    PMR consult still pending, per Radha Herrera at [facility], they can accept the

## 2019-03-09 PROCEDURE — 99232 SBSQ HOSP IP/OBS MODERATE 35: CPT | Performed by: HOSPITALIST

## 2019-03-09 RX ORDER — POLYETHYLENE GLYCOL 3350 17 G/17G
17 POWDER, FOR SOLUTION ORAL DAILY
Refills: 0 | Status: SHIPPED | COMMUNITY
Start: 2019-03-09 | End: 2019-04-19 | Stop reason: ALTCHOICE

## 2019-03-09 RX ORDER — PSEUDOEPHEDRINE HCL 30 MG
100 TABLET ORAL 2 TIMES DAILY
Refills: 0 | Status: SHIPPED | COMMUNITY
Start: 2019-03-09 | End: 2019-04-19 | Stop reason: ALTCHOICE

## 2019-03-09 RX ORDER — ASPIRIN 81 MG/1
81 TABLET ORAL DAILY
Refills: 0 | Status: SHIPPED | COMMUNITY
Start: 2019-03-10 | End: 2019-05-31

## 2019-03-09 NOTE — PROGRESS NOTES
[facility] - [facility]    Progress Note    Albino Brood Patient Status:  Inpatient    3/30/1960 MRN [id number]   Location [facility] 4W/SW/SE Attending Tahmina Batres MD   [facility] Day # 3 PCP Jorge Luis Hernandez DO        Subjective:   Albino Brood 13:49     Approved by (CST): Mairanne Stewart MD on 3/07/2019 at 13:53

## 2019-03-09 NOTE — PAYOR COMM NOTE
--------------  CONTINUED STAY REVIEW    Payor: [facility] PPO  Subscriber #:  [id number]  Authorization Number: [id number]    Admit date: 3/6/19  Admit time: 1815    Admitting Physician: Liang Mendiola MD  Attending Physician:  Carley Mckeon MD  Prim (ISOVUE-M) 76 % injection 100 mL     Date Action Dose Route User    3/7/2019 2035 Given 80 mL Intravenous (Right Lower Arm) Michael JOHNSON      morphINE sulfate (PF) 2 MG/ML injection 2 mg     Date Action Dose Route User    3/8/2019 0844 Given 2 mg Luis Manuelea Job self-care at the wheelchair level, modified independent with stand-pivot transfers, and ambulation short distance while adhering to nonweightbearing status. Rehab potential is excellent.   Estimated length of stay for return to home environment approximate

## 2019-03-09 NOTE — PROGRESS NOTES
[facility] - [facility]    Progress Note    Chacorta Bar Patient Status:  Hospital Outpatient Surgery    3/30/1960 MRN [id number]   Location [facility] UNIT Attending Alissa Morrow MD     Hosp Day # 3 PCP Danielle Armstrong PROPHYLAXIS--> sq lovenox  - PT/OT. - plan acute rehab    Chest Pain  -s/p rrt in am  -d. Dimer elevated  -ct scan chest neg for PE but notable for cardiomegaly and fluid overload  -plan echo--> wnl  -no further w/u    Morbid obesity with BMI of 40.0-44.

## 2019-03-10 PROCEDURE — 99232 SBSQ HOSP IP/OBS MODERATE 35: CPT | Performed by: HOSPITALIST

## 2019-03-10 NOTE — PROGRESS NOTES
[address] [place] [address] 6 Patient Status:  Inpatient    3/30/1960 MRN [id number]   Location [facility] 4W/SW/SE Attending Shantel John MD   [facility] Day # 4 PCP DO Torres Wheelertommy Coley is a 60year old female patient. are normal.    Extremities: (Pt in NAD. VSS stable . Splint intact on ankle, knee immbolizer in place. No calf tenderness.  )        Assessment:    Condition: In stable condition. Plan:   (S/p ankle ORIF, patella tendon repair    1.   Ok to d/c to r

## 2019-03-10 NOTE — PHYSICAL THERAPY NOTE
PHYSICAL THERAPY TREATMENT NOTE - INPATIENT     Room Number: 907/285-G       Presenting Problem: s/p L patella tendon repair & ankle ORIF    Problem List  Principal Problem:    Displaced fracture of distal end of right fibula  Active Problems:     Morbid ob knee  Management Techniques: Activity promotion; Body mechanics; Relaxation;Repositioning    BALANCE                                                                                                                     Static Sitting: Fair  Dynamic Sitting:  Fa Status Sit to stand  Transfers with the RW min A   Goal #3 Patient is able to demonstrate transfers EOB to/from Chair/Wheelchair at assistance level: supervision with walker - rolling   Goal #3   Current Status SPT min A with the RW   Goal #4 Patient is ab

## 2019-03-10 NOTE — PROGRESS NOTES
[facility]    Progress Note    Albino Brood Patient Status:  Inpatient    3/30/1960 MRN [id number]   Location [facility] 4W/SW/SE Attending Brett Gonsales MD   [facility] Day # 4 PCP Jorge Luis Hernandez DO       Subjective:     Pain contr

## 2019-03-10 NOTE — PHYSICAL THERAPY NOTE
PHYSICAL THERAPY TREATMENT NOTE - INPATIENT     Room Number: 883/221-B       Presenting Problem: s/p L patella tendon repair & ankle ORIF    Problem List  Principal Problem:    Displaced fracture of distal end of right fibula  Active Problems:     Morbid ob Static Sitting: Fair -  Dynamic Sitting: Fair -           Static Standing: Poor +  Dynamic Standing: Poor +    ACTIVITY TOLERANCE  Pulse: 88 Goal #2  Current Status Sit to stand w/o AD Max A x2   Goal #3 Patient is able to demonstrate transfers EOB to/from Chair/Wheelchair at assistance level: supervision with walker - rolling   Goal #3   Current Status SPT Max x 2   Goal #4 Patient is able t

## 2019-03-11 VITALS
DIASTOLIC BLOOD PRESSURE: 68 MMHG | WEIGHT: 260 LBS | HEIGHT: 65 IN | HEART RATE: 76 BPM | SYSTOLIC BLOOD PRESSURE: 124 MMHG | OXYGEN SATURATION: 100 % | TEMPERATURE: 98 F | RESPIRATION RATE: 20 BRPM | BODY MASS INDEX: 43.32 KG/M2

## 2019-03-11 PROCEDURE — 99239 HOSP IP/OBS DSCHRG MGMT >30: CPT | Performed by: HOSPITALIST

## 2019-03-11 NOTE — PHYSICAL THERAPY NOTE
PHYSICAL THERAPY TREATMENT NOTE - INPATIENT     Room Number: 695/598-T       Presenting Problem: s/p L patella tendon repair & ankle ORIF    Problem List  Principal Problem:    Displaced fracture of distal end of right fibula  Active Problems:     Morbid ob Static Sitting: Fair  Dynamic Sitting: Fair           Static Standing: Poor +  Dynamic Standing: Poor +    ACTIVITY TOLERANCE                         O2 WALK                  AM-PAC '6-Clicks' INPATIENT SHORT FORM - BASIC MOBILITY  How much diffic SPT min A w/RW   Goal #4 Patient is able to ambulate 15 feet with assist device: walker - rolling at assistance level: supervision   Goal #4   Current Status Not tested, see above   Goal #5 Patient will negotiate 8 stairs/one curb w/ assistive device and s

## 2019-03-11 NOTE — DISCHARGE SUMMARY
[facility]    Discharge Summary    Kinga Conroy Patient Status:  Inpatient    3/30/1960 MRN [id number]   Location [facility] 4W/SW/SE Attending Cynthia Hoyos MD   Hosp Day # 5 PCP Heriberto Dandy, DO     Date of Admission: 3/6 reports she is unable to extend her knee and is unable to perform straight leg raise. She comes in today for evaluation. Hospital Course:     Pt was admitted and treated as below:    Displaced fracture of distal end of right fibula.   Left patellar tend OIL      Take  by mouth. Refills:  0     Phentermine HCl 30 MG Caps      Take 1 capsule (30 mg total) by mouth every morning. Quantity:  30 capsule  Refills:  1     Vitamin C 500 MG Chew  Commonly known as:  VITAMIN C      Chew  by mouth.    Refills:  0

## 2019-03-11 NOTE — PLAN OF CARE
MUSCULOSKELETAL - ADULT    • Return mobility to safest level of function Progressing    • Maintain proper alignment of affected body part Progressing        Up with stand pivot to [facility] with 1 assist. She is NWB to right leg. Left leg WBAT.  Fitted with a new

## 2019-03-11 NOTE — CM/SW NOTE
PMR consult sent to [facility], PT/OT updates sent. EDILIA is waiting for INS. Nam Holbrook. and final approval.    EDILIA will continue to follow up. ADDENDUM 3.11.19 3:17 PM     INS. AUTH. RECEIVED.  Pt is going to transfer to [facility] today, 3.11.1

## 2019-03-11 NOTE — PLAN OF CARE
Patient is being discharged to [facility]. [facility]ar transferring patient will  at 1700. Report given to Cecilio Barillas at [phone]. Scripts and discharge packet given. Patient leaving in stable condition.

## 2019-03-11 NOTE — PROGRESS NOTES
[facility]    Progress Note    Augusto Lopes Patient Status:  Inpatient    3/30/1960 MRN [id number]   Location [facility] 4W/SW/SE Attending Oscar Quarles,  [facility]  Day # 5 PCP Maggie Kate DO        Subjective:   Augusto Lopes

## 2019-03-11 NOTE — PAYOR COMM NOTE
--------------  CONTINUED STAY REVIEW    Payor: [facility] PPO  Subscriber #:  [id number]  Authorization Number: [id number]    Admit date: 3/6/19  Admit time: 1815    Admitting Physician: Jossue Smalls MD  Attending Physician:  Benitez Ackerman MD  [address] [facility] STATUS, POSSIBLE UNDIAGNOSED BO.      Van Izaguirre MD  3/10/2019                 Electronically signed by Shayne Chance MD at 3/10/2019  6:50 PM   Subjective:      Constitutional: Negative.     feels better  Denies cp, sob, dizziness  Pain well cont scan chest neg for PE but notable for cardiomegaly and fluid overload  -plan echo--> wnl  -no further w/u     Morbid obesity with BMI of 40.0-44.9, adult ([facility])  MONITOR RESPIRATORY AND HEMODYNAMIC STATUS, POSSIBLE UNDIAGNOSED BO.      Dispo- pending--> PM

## 2019-03-12 ENCOUNTER — TELEPHONE (OUTPATIENT)
Dept: ORTHOPEDICS CLINIC | Facility: CLINIC | Age: 59
End: 2019-03-12

## 2019-03-12 NOTE — TELEPHONE ENCOUNTER
Dr. Sudheer Mistry from [address] [place] [address] Lab called. States pt was transferred to their care. States she has several questions for Dr. Megan Borges regarding pt's restrictions, etc. Dr. Sudheer Mistry asking for [facility] to call her at [phone] (her cell) to discuss.      Dr. Jose Bueno

## 2019-03-13 NOTE — PAYOR COMM NOTE
--------------  DISCHARGE REVIEW    Payor: [employer]  Subscriber #:  [id number]  Authorization Number: [id number]    Admit date: 3/6/19  Admit time:  1815  Discharge Date: 3/11/2019  5:41 PM     Admitting Physician: Donis Hammans., MD  Attending Phys m/g/r  Pulm:   CTA bilat  Abd:   +bs, soft, NT, ND  LE:   Right lower leg in cast - wiggles toes, sensation intact, toes warm.      Left knee in brace, wiggles toes, sensation intact, toes warm  Neuro:   nonfocal      Reason for Admission:   Right ankle suzanna MG Tabs  Commonly known as:  NORCO      Take 1-2 tablets by mouth every 6 (six) hours as needed. Quantity:  40 tablet  Refills:  0     PEG 3350 Pack  Commonly known as:  MIRALAX      Take 17 g by mouth daily.    Refills:  0        CONTINUE taking these me Risk  0-28   Low Risk. TCM Follow-Up Recommendation:  LACE < 29: Low Risk of readmission after discharge from the hospital; Still recommend for TCM follow-up. >35 minutes spent preparing this discharge.     Armando Mares  3/11/2019  12:08 PM     El

## 2019-03-13 NOTE — TELEPHONE ENCOUNTER
Dr. Fay Adamson afternoon,   Pt is requesting disability starting date of fall. I covered her for 12 wks (max) as she has been off starting 2-25-19 and is still an in patient rehab. Also son is requesting FMLA (intermittent ) to care for her.   I

## 2019-03-15 NOTE — TELEPHONE ENCOUNTER
Dr. Jarad Dickinson,       2 forms below #1. For pt disability and #2 son FMLA (intermittent time) X 12 wks to help care for his mom. Please sign off on both for Dr. Jarad Dickinson below in this encounter.       Thanks Jillian         Please sign off on form:  -Francois

## 2019-03-18 ENCOUNTER — TELEPHONE (OUTPATIENT)
Dept: ORTHOPEDICS CLINIC | Facility: CLINIC | Age: 59
End: 2019-03-18

## 2019-03-18 NOTE — TELEPHONE ENCOUNTER
Called Dr. Bozena Yancey and informed her per [facility] message.  She had no further q's

## 2019-03-18 NOTE — TELEPHONE ENCOUNTER
Patient should remain NWB on RLE. Can be WBAT on LLE with knee locked in extension. Needs to stay NWB on RLE due to comminution of distal fibula and needs to be locked in extension for LLE due to repair of tendon.

## 2019-03-18 NOTE — TELEPHONE ENCOUNTER
Dr. Barbara Macedo from [address] [facility] called and states the D gave them the ok to take out sutures from left knee and right ankle on 3/20 and to put pt in [place] for right ankle. She wants to know 2 things-    1.  [facility]

## 2019-03-18 NOTE — TELEPHONE ENCOUNTER
FMLA faxed 3-18-19 to [employer] [address]. Disability form faxed to [facility]. PC to pt informed and she will inform her son. Would like origs mailed to home address will hold until fax receipt has been recvd.     Pt paid fee in prev P

## 2019-03-20 ENCOUNTER — TELEPHONE (OUTPATIENT)
Dept: ORTHOPEDICS CLINIC | Facility: CLINIC | Age: 59
End: 2019-03-20

## 2019-03-20 NOTE — TELEPHONE ENCOUNTER
Call from Dr. Cerrato Mean from [facility] about Ike Ramirez  Just to let you know  Sutures, splints dressings removed from her various surgery incisional spots. Xray of right ankle ordered but can not be done.  Would have to go up three steps and can not maneuver this  Pt

## 2019-03-29 ENCOUNTER — OFFICE VISIT (OUTPATIENT)
Dept: ORTHOPEDICS CLINIC | Facility: CLINIC | Age: 59
End: 2019-03-29
Payer: COMMERCIAL

## 2019-03-29 ENCOUNTER — HOSPITAL ENCOUNTER (OUTPATIENT)
Dept: GENERAL RADIOLOGY | Facility: HOSPITAL | Age: 59
Discharge: HOME | End: 2019-03-29
Attending: ORTHOPAEDIC SURGERY
Payer: COMMERCIAL

## 2019-03-29 DIAGNOSIS — S86.812A PATELLAR TENDON RUPTURE, LEFT, INITIAL ENCOUNTER: Primary | ICD-10-CM

## 2019-03-29 DIAGNOSIS — Z47.89 ORTHOPEDIC AFTERCARE: ICD-10-CM

## 2019-03-29 DIAGNOSIS — S82.831A DISPLACED FRACTURE OF DISTAL END OF RIGHT FIBULA: ICD-10-CM

## 2019-03-29 PROCEDURE — 99024 POSTOP FOLLOW-UP VISIT: CPT | Performed by: ORTHOPAEDIC SURGERY

## 2019-03-29 PROCEDURE — 99212 OFFICE O/P EST SF 10 MIN: CPT | Performed by: ORTHOPAEDIC SURGERY

## 2019-03-29 PROCEDURE — 73610 X-RAY EXAM OF ANKLE: CPT | Performed by: ORTHOPAEDIC SURGERY

## 2019-03-29 NOTE — PROGRESS NOTES
This is a pleasant 50-year-old female that is 3 weeks status post open reduction internal fixation of the right distal fibula and repair of the left patellar tendon. She has been at a rehab facility. She has had no chest pain or shortness of breath.   She

## 2019-04-19 ENCOUNTER — OFFICE VISIT (OUTPATIENT)
Dept: ORTHOPEDICS CLINIC | Facility: CLINIC | Age: 59
End: 2019-04-19
Payer: COMMERCIAL

## 2019-04-19 ENCOUNTER — HOSPITAL ENCOUNTER (OUTPATIENT)
Dept: GENERAL RADIOLOGY | Facility: HOSPITAL | Age: 59
Discharge: HOME | End: 2019-04-19
Attending: ORTHOPAEDIC SURGERY
Payer: COMMERCIAL

## 2019-04-19 DIAGNOSIS — S86.812A PATELLAR TENDON RUPTURE, LEFT, INITIAL ENCOUNTER: ICD-10-CM

## 2019-04-19 DIAGNOSIS — Z47.89 ORTHOPEDIC AFTERCARE: ICD-10-CM

## 2019-04-19 DIAGNOSIS — S82.831A DISPLACED FRACTURE OF DISTAL END OF RIGHT FIBULA: Primary | ICD-10-CM

## 2019-04-19 PROCEDURE — 73610 X-RAY EXAM OF ANKLE: CPT | Performed by: ORTHOPAEDIC SURGERY

## 2019-04-19 PROCEDURE — 99024 POSTOP FOLLOW-UP VISIT: CPT | Performed by: ORTHOPAEDIC SURGERY

## 2019-04-19 PROCEDURE — 99212 OFFICE O/P EST SF 10 MIN: CPT | Performed by: ORTHOPAEDIC SURGERY

## 2019-04-19 NOTE — PROGRESS NOTES
This is a pleasant 58-year-old female that is 6 weeks status post a left patellar tendon repair and open reduction internal fixation of the right ankle. She has had no chest pain or shortness of breath. She has not begun physical therapy yet.   She comes

## 2019-04-22 ENCOUNTER — PATIENT MESSAGE (OUTPATIENT)
Dept: ORTHOPEDICS CLINIC | Facility: CLINIC | Age: 59
End: 2019-04-22

## 2019-04-22 ENCOUNTER — PATIENT MESSAGE (OUTPATIENT)
Dept: FAMILY MEDICINE CLINIC | Facility: CLINIC | Age: 59
End: 2019-04-22

## 2019-04-22 NOTE — TELEPHONE ENCOUNTER
From: Jeane Godwin  To: Librado Marcial MD  Sent: 4/22/2019 1:25 PM CDT  Subject: Other    I was setting up my outpatient therapy and was want to know if I can do Pool Therapy ? Please let me know if it's to soon for that.

## 2019-04-22 NOTE — TELEPHONE ENCOUNTER
From: Grace Au  To:  Martin Cole DO  Sent: 4/22/2019 1:33 PM CDT  Subject: Prescription Question    It was recommended that I contact my PCP to go over my medication I sent a message to my orthopedic doctor I'm sure you can access my prescription

## 2019-05-23 ENCOUNTER — TELEPHONE (OUTPATIENT)
Dept: ADMINISTRATIVE | Age: 59
End: 2019-05-23

## 2019-05-24 NOTE — TELEPHONE ENCOUNTER
[facility] FMLA form for Dr. Mira Kong received in Forms dept+ FCR+ Signed release. Logged for processing.  NK

## 2019-05-31 ENCOUNTER — OFFICE VISIT (OUTPATIENT)
Dept: ORTHOPEDICS CLINIC | Facility: CLINIC | Age: 59
End: 2019-05-31
Payer: COMMERCIAL

## 2019-05-31 ENCOUNTER — HOSPITAL ENCOUNTER (OUTPATIENT)
Dept: GENERAL RADIOLOGY | Facility: HOSPITAL | Age: 59
Discharge: HOME | End: 2019-05-31
Attending: ORTHOPAEDIC SURGERY
Payer: COMMERCIAL

## 2019-05-31 DIAGNOSIS — S86.812A PATELLAR TENDON RUPTURE, LEFT, INITIAL ENCOUNTER: ICD-10-CM

## 2019-05-31 DIAGNOSIS — Z47.89 ORTHOPEDIC AFTERCARE: ICD-10-CM

## 2019-05-31 DIAGNOSIS — S82.831A DISPLACED FRACTURE OF DISTAL END OF RIGHT FIBULA: Primary | ICD-10-CM

## 2019-05-31 PROCEDURE — 73610 X-RAY EXAM OF ANKLE: CPT | Performed by: ORTHOPAEDIC SURGERY

## 2019-05-31 PROCEDURE — 99212 OFFICE O/P EST SF 10 MIN: CPT | Performed by: ORTHOPAEDIC SURGERY

## 2019-05-31 PROCEDURE — 99024 POSTOP FOLLOW-UP VISIT: CPT | Performed by: ORTHOPAEDIC SURGERY

## 2019-05-31 NOTE — TELEPHONE ENCOUNTER
Dr. Samuel Smith,    I have attached 2 forms that require your signature. Please sign off on form:  -Highlight the patient and hit \"Chart\" button. -In Chart Review, w/in the Encounter tab - click 1 time on the Telephone call encounter for 5/23/19. Scroll down the telephone encounter.  -Click \"scan on\" blue Hyperlink under \"Media\" heading for RTW Release, FMLA Dr. Samuel Smith 5/23/19 w/in the telephone enc.  -Click on Acknowledge button at the bottom right corner and left-click onto image, signature stamp appears and drag signature to Provider signature line. Stamp will turn blue. Close window.     Thank you,    Lianne Palacios

## 2019-05-31 NOTE — PROGRESS NOTES
This is a pleasant 29-year-old female that is nearly 3 months status post open reduction internal fixation of the right distal fibula and  repair of the left patella tendon. The patient has had no chest pain or shortness of breath.   She has been working i

## 2019-06-14 ENCOUNTER — OFFICE VISIT (OUTPATIENT)
Dept: ORTHOPEDICS CLINIC | Facility: CLINIC | Age: 59
End: 2019-06-14
Payer: COMMERCIAL

## 2019-06-14 DIAGNOSIS — S82.831A DISPLACED FRACTURE OF DISTAL END OF RIGHT FIBULA: Primary | ICD-10-CM

## 2019-06-14 PROCEDURE — 99213 OFFICE O/P EST LOW 20 MIN: CPT | Performed by: ORTHOPAEDIC SURGERY

## 2019-06-14 PROCEDURE — 99212 OFFICE O/P EST SF 10 MIN: CPT | Performed by: ORTHOPAEDIC SURGERY

## 2019-06-14 NOTE — PROGRESS NOTES
This is a pleasant 59-year-old female that is just over 3 months status post open reduction internal fixation of the right distal fibula. She also underwent a left patella tendon repair. The patient is currently in physical therapy.   She reports that she

## 2019-06-17 ENCOUNTER — OFFICE VISIT (OUTPATIENT)
Dept: FAMILY MEDICINE CLINIC | Facility: CLINIC | Age: 59
End: 2019-06-17
Payer: COMMERCIAL

## 2019-06-17 VITALS
SYSTOLIC BLOOD PRESSURE: 127 MMHG | DIASTOLIC BLOOD PRESSURE: 79 MMHG | RESPIRATION RATE: 18 BRPM | TEMPERATURE: 98 F | WEIGHT: 265 LBS | BODY MASS INDEX: 44 KG/M2 | HEART RATE: 93 BPM

## 2019-06-17 DIAGNOSIS — R21 FACIAL RASH: Primary | ICD-10-CM

## 2019-06-17 DIAGNOSIS — M25.471 RIGHT ANKLE SWELLING: ICD-10-CM

## 2019-06-17 PROCEDURE — 99212 OFFICE O/P EST SF 10 MIN: CPT | Performed by: FAMILY MEDICINE

## 2019-06-17 PROCEDURE — 99214 OFFICE O/P EST MOD 30 MIN: CPT | Performed by: FAMILY MEDICINE

## 2019-06-17 RX ORDER — FUROSEMIDE 20 MG/1
10 TABLET ORAL DAILY
Qty: 20 TABLET | Refills: 0 | Status: SHIPPED | OUTPATIENT
Start: 2019-06-17 | End: 2019-06-22

## 2019-06-17 NOTE — PROGRESS NOTES
HPI: Arsalan Mitchell is a 60year old female who presents for right ankle swelling. Ruyell Mighty on ice in February and had displaced fracture. Had surgery on 3/6/19. Has plate and screws placed. Boot came off on 6/6 and she is in therapy. Followed with Ortho.  States s encounter diagnosis)    Suspect molluscum. Reassured pt that rash should resolve in the next month or two. May use Hydrocortisone PRN. Right ankle swelling    Advised compression stocking. Advised elevation when sitting.   Will give a few days of diure

## 2019-07-26 ENCOUNTER — HOSPITAL ENCOUNTER (OUTPATIENT)
Dept: GENERAL RADIOLOGY | Facility: HOSPITAL | Age: 59
Discharge: HOME | End: 2019-07-26
Attending: ORTHOPAEDIC SURGERY
Payer: COMMERCIAL

## 2019-07-26 ENCOUNTER — OFFICE VISIT (OUTPATIENT)
Dept: ORTHOPEDICS CLINIC | Facility: CLINIC | Age: 59
End: 2019-07-26
Payer: COMMERCIAL

## 2019-07-26 DIAGNOSIS — S82.831A DISPLACED FRACTURE OF DISTAL END OF RIGHT FIBULA: Primary | ICD-10-CM

## 2019-07-26 DIAGNOSIS — S86.812A PATELLAR TENDON RUPTURE, LEFT, INITIAL ENCOUNTER: ICD-10-CM

## 2019-07-26 DIAGNOSIS — Z47.89 ORTHOPEDIC AFTERCARE: ICD-10-CM

## 2019-07-26 PROCEDURE — 99213 OFFICE O/P EST LOW 20 MIN: CPT | Performed by: ORTHOPAEDIC SURGERY

## 2019-07-26 PROCEDURE — 73610 X-RAY EXAM OF ANKLE: CPT | Performed by: ORTHOPAEDIC SURGERY

## 2019-07-26 NOTE — PROGRESS NOTES
This is a pleasant 19-year-old female that is 4.5 months status post open reduction internal fixation of the right distal fibula and left patella tendon repair. The patient is ambulating with a cane today.   She reports that her pain in her right ankle and

## 2019-08-26 ENCOUNTER — APPOINTMENT (OUTPATIENT)
Dept: LAB | Facility: HOSPITAL | Age: 59
End: 2019-08-26
Attending: FAMILY MEDICINE
Payer: COMMERCIAL

## 2019-08-26 ENCOUNTER — OFFICE VISIT (OUTPATIENT)
Dept: FAMILY MEDICINE CLINIC | Facility: CLINIC | Age: 59
End: 2019-08-26
Payer: COMMERCIAL

## 2019-08-26 VITALS
HEART RATE: 96 BPM | TEMPERATURE: 98 F | WEIGHT: 268 LBS | BODY MASS INDEX: 45 KG/M2 | DIASTOLIC BLOOD PRESSURE: 79 MMHG | SYSTOLIC BLOOD PRESSURE: 121 MMHG

## 2019-08-26 DIAGNOSIS — R20.2 NUMBNESS AND TINGLING IN RIGHT HAND: Primary | ICD-10-CM

## 2019-08-26 DIAGNOSIS — R20.0 NUMBNESS AND TINGLING IN RIGHT HAND: Primary | ICD-10-CM

## 2019-08-26 DIAGNOSIS — Z12.11 SCREENING FOR COLON CANCER: ICD-10-CM

## 2019-08-26 DIAGNOSIS — Z12.39 SCREENING FOR BREAST CANCER: ICD-10-CM

## 2019-08-26 PROCEDURE — 99213 OFFICE O/P EST LOW 20 MIN: CPT | Performed by: FAMILY MEDICINE

## 2019-08-26 PROCEDURE — 36415 COLL VENOUS BLD VENIPUNCTURE: CPT

## 2019-08-26 NOTE — TELEPHONE ENCOUNTER
I called and spoke to pt, I verified her allergies, reconciled medications and asked if there was any changes to her medical Hx? Pt denied any changes to her medical Hx, and stated that she has no taken Phentermine since February.   I sent instructions via

## 2019-08-26 NOTE — PROGRESS NOTES
HPI: Blane Álvarez is a 60year old female who presents for tingling on right hand. Started about few months ago. States thumb itches at time. No pain in wrist or elbow. Located just in fingertips. No skin changes. Denies neck pain.   Worse with laying down in Screening for breast cancer     Mammogram ordered. Will call with results. Screening for colon cancer    Refer to GI for evaluation.     Angelica Kohli,

## 2019-08-26 NOTE — TELEPHONE ENCOUNTER
I called and spoke to pt informing her that we changed her arrival time by 15 min, to 10:15 am, and 2nd bowel prep dose @ 4 am, pt agreed and stated that she was witting everything down on her calendar.    I reminded pt that I had sent instructions via [facility]

## 2019-08-27 LAB
ANA SCREEN, IFA: NEGATIVE
BUN: 10 MG/DL (ref 7–25)
CALCIUM: 9.3 MG/DL (ref 8.6–10.4)
CARBON DIOXIDE: 24 MMOL/L (ref 20–32)
CHLORIDE: 103 MMOL/L (ref 98–110)
CREATININE: 0.77 MG/DL (ref 0.5–0.99)
EGFR IF AFRICN AM: 97 ML/MIN/1.73M2
EGFR IF NONAFRICN AM: 84 ML/MIN/1.73M2
GLUCOSE: 96 MG/DL (ref 65–99)
HEMOGLOBIN A1C: 6.3 % OF TOTAL HGB
POTASSIUM: 4.4 MMOL/L (ref 3.5–5.3)
SED RATE BY MODIFIED$WESTERGREN: 25 MM/H
SODIUM: 140 MMOL/L (ref 135–146)
TSH: 1.39 MIU/L (ref 0.4–4.5)
VITAMIN B12: 555 PG/ML (ref 200–1100)
VITAMIN D, 25-OH, TOTAL: 17 NG/ML (ref 30–100)

## 2019-08-29 RX ORDER — POLYETHYLENE GLYCOL 3350, SODIUM CHLORIDE, SODIUM BICARBONATE, POTASSIUM CHLORIDE 420; 11.2; 5.72; 1.48 G/4L; G/4L; G/4L; G/4L
POWDER, FOR SOLUTION ORAL
Qty: 1 BOTTLE | Refills: 0 | Status: ON HOLD | OUTPATIENT
Start: 2019-08-29 | End: 2019-09-04

## 2019-09-03 ENCOUNTER — TELEPHONE (OUTPATIENT)
Dept: GASTROENTEROLOGY | Facility: CLINIC | Age: 59
End: 2019-09-03

## 2019-09-03 NOTE — TELEPHONE ENCOUNTER
LMTCB-CSS TRANSFER TO RN! Left message for pt to c/b to discuss her prep instructions. Reviewed they are also available to her via [employer], which looks like she has reviewed 9/3/19 @ 2153.

## 2019-09-03 NOTE — TELEPHONE ENCOUNTER
Pt contacted and reviewed split dose prep instructions and clear liquid diet instructions, she verbalized understanding of all and did not have further questions or concerns at this time.

## 2019-09-04 ENCOUNTER — HOSPITAL ENCOUNTER (OUTPATIENT)
Facility: HOSPITAL | Age: 59
Setting detail: HOSPITAL OUTPATIENT SURGERY
Discharge: HOME | End: 2019-09-04
Attending: INTERNAL MEDICINE | Admitting: INTERNAL MEDICINE
Payer: COMMERCIAL

## 2019-09-04 ENCOUNTER — TELEPHONE (OUTPATIENT)
Dept: GASTROENTEROLOGY | Facility: CLINIC | Age: 59
End: 2019-09-04

## 2019-09-04 ENCOUNTER — ANESTHESIA (OUTPATIENT)
Dept: ENDOSCOPY | Facility: HOSPITAL | Age: 59
End: 2019-09-04
Payer: COMMERCIAL

## 2019-09-04 ENCOUNTER — ANESTHESIA EVENT (OUTPATIENT)
Dept: ENDOSCOPY | Facility: HOSPITAL | Age: 59
End: 2019-09-04
Payer: COMMERCIAL

## 2019-09-04 DIAGNOSIS — Z12.11 COLON CANCER SCREENING: ICD-10-CM

## 2019-09-04 PROCEDURE — 45378 DIAGNOSTIC COLONOSCOPY: CPT | Performed by: INTERNAL MEDICINE

## 2019-09-04 PROCEDURE — 0DJD8ZZ INSPECTION OF LOWER INTESTINAL TRACT, VIA NATURAL OR ARTIFICIAL OPENING ENDOSCOPIC: ICD-10-PCS | Performed by: INTERNAL MEDICINE

## 2019-09-04 RX ORDER — SODIUM CHLORIDE, SODIUM LACTATE, POTASSIUM CHLORIDE, CALCIUM CHLORIDE 600; 310; 30; 20 MG/100ML; MG/100ML; MG/100ML; MG/100ML
INJECTION, SOLUTION INTRAVENOUS CONTINUOUS
Status: DISCONTINUED | OUTPATIENT
Start: 2019-09-04 | End: 2019-09-04

## 2019-09-04 RX ORDER — LIDOCAINE HYDROCHLORIDE 10 MG/ML
INJECTION, SOLUTION EPIDURAL; INFILTRATION; INTRACAUDAL; PERINEURAL AS NEEDED
Status: DISCONTINUED | OUTPATIENT
Start: 2019-09-04 | End: 2019-09-04 | Stop reason: SURG

## 2019-09-04 RX ORDER — SODIUM CHLORIDE, SODIUM LACTATE, POTASSIUM CHLORIDE, CALCIUM CHLORIDE 600; 310; 30; 20 MG/100ML; MG/100ML; MG/100ML; MG/100ML
INJECTION, SOLUTION INTRAVENOUS CONTINUOUS
Status: CANCELLED | OUTPATIENT
Start: 2019-09-04

## 2019-09-04 RX ORDER — NALOXONE HYDROCHLORIDE 0.4 MG/ML
80 INJECTION, SOLUTION INTRAMUSCULAR; INTRAVENOUS; SUBCUTANEOUS AS NEEDED
Status: CANCELLED | OUTPATIENT
Start: 2019-09-04 | End: 2019-09-04

## 2019-09-04 RX ADMIN — SODIUM CHLORIDE, SODIUM LACTATE, POTASSIUM CHLORIDE, CALCIUM CHLORIDE: 600; 310; 30; 20 INJECTION, SOLUTION INTRAVENOUS at 10:57:00

## 2019-09-04 RX ADMIN — LIDOCAINE HYDROCHLORIDE 40 MG: 10 INJECTION, SOLUTION EPIDURAL; INFILTRATION; INTRACAUDAL; PERINEURAL at 11:00:00

## 2019-09-04 NOTE — TELEPHONE ENCOUNTER
Entered into Epic:Recall colon in 5 years per Dr. Anny Muñoz. Last Colon done 9/4/19, next due 9/4/24. HM updated.

## 2019-09-04 NOTE — H&P
History & Physical Examination    Patient Name: Lora Woods  MRN: [id number]  [facility]: [id number]  YOB: 1960    Diagnosis: screening for colon cancer      Medications Prior to Admission:  TURMERIC OR Take by mouth.  Disp:  Rfl:  at has not starte Comment: Wine - occasionally      SYSTEM Check if Review is Normal Check if Physical Exam is Normal If not normal, please explain:   LAURA [facility] ] [ Ling Candelario  [facility] ] [ X]    HEART [facility] ] [ Xenia Lu [facility] ] [ Kaitlin Meade [facility] ] [ Arben Haq [facility] ] [ Albertina Lindsey

## 2019-09-04 NOTE — OPERATIVE REPORT
COLONOSCOPY REPORT    Morenita Lunsford     3/30/1960 Age 60year old   PCP Raghu Moss DO Endoscopist Jaymie Celaya MD     Date of procedure: 19    Procedure: Colonoscopy     Pre-operative diagnosis: Screening    Post-operative diagnosis: inflammation. 6. MARY: normal rectal tone, no masses palpated. Impression:   · Pan-colonic diverticulosis. · Internal hemorrhoids, small. Recommend:  · Repeat CLN in 5 years due to hx of polyps.  If new signs or symptoms develop, colonoscopy may

## 2019-09-04 NOTE — TELEPHONE ENCOUNTER
Shola Talbot MD  P [facility] Gi Clinical Staff             GI staff: please place recall for colonoscopy in 5 years

## 2019-09-04 NOTE — ANESTHESIA POSTPROCEDURE EVALUATION
Patient: Sheridan Mota    Procedure Summary     Date:  09/04/19 Room / Location:  [address] [place] [address] ENDOSCOPY 01 / [address] [place] [address] ENDOSCOPY    Anesthesia Start:  7725 Anesthesia Stop:  8514    Procedure:  COLONOSCOPY (N/A ) Diagnosis:       Colon cancer screening      (diverticulos

## 2019-09-04 NOTE — ANESTHESIA PREPROCEDURE EVALUATION
Anesthesia PreOp Note    HPI:     Yelena Rosen is a 60year old female who presents for preoperative consultation requested by: Napoleon Maddox MD    Date of Surgery: 9/4/2019    Procedure(s):  COLONOSCOPY  Indication: Colon cancer screening    Relevant P Vitamin (MULTIVITAMINS) Oral Cap Take  by mouth. Disp:  Rfl:  8/28/2019   PEG 3350-KCl-Na Bicarb-NaCl (TRILYTE) 420 g Oral Recon Soln As directed.  Disp: 1 Bottle Rfl: 0        Current Facility-Administered Medications Ordered in Epic:  lactated ringers inf abused: Not on file        Physically abused: Not on file        Forced sexual activity: Not on file    Other Topics      Concerns:         Service: Not Asked        Blood Transfusions: Not Asked        Caffeine Concern: Yes          Jennifer Mayen with:  Surgeon      I have informed Lora Woods and/or legal guardian or family member of the nature of the anesthetic plan, benefits, risks including possible dental damage if relevant, major complications, and any alternative forms of anesthetic managem

## 2019-09-05 VITALS
BODY MASS INDEX: 38.32 KG/M2 | DIASTOLIC BLOOD PRESSURE: 70 MMHG | WEIGHT: 230 LBS | OXYGEN SATURATION: 94 % | HEIGHT: 65 IN | RESPIRATION RATE: 18 BRPM | HEART RATE: 84 BPM | SYSTOLIC BLOOD PRESSURE: 134 MMHG

## 2019-09-07 ENCOUNTER — TELEPHONE (OUTPATIENT)
Dept: FAMILY MEDICINE CLINIC | Facility: CLINIC | Age: 59
End: 2019-09-07

## 2019-09-07 DIAGNOSIS — R92.8 ABNORMAL MAMMOGRAM OF LEFT BREAST: Primary | ICD-10-CM

## 2019-09-07 NOTE — TELEPHONE ENCOUNTER
Mammogram report received from [facility]) breast center; placed on DR Cecilio olson for review.

## 2019-09-07 NOTE — TELEPHONE ENCOUNTER
Mammogram results received. Left focal asymmetry was noted. She needs additional imaging on left side and ultrasound. Orders placed.   Please notify patient and orders faxed to [facility]

## 2019-09-09 NOTE — TELEPHONE ENCOUNTER
Spoke with patient (*verified name and ),advised Dr Radha Pedro note and verbalized understanding. Requesting to sent the order through [address] Patient presently at [place] for a week. Order sent. Note      Mammogram results received.  Left focal asymme

## 2019-09-10 DIAGNOSIS — E55.9 VITAMIN D DEFICIENCY: Primary | ICD-10-CM

## 2019-09-10 RX ORDER — ERGOCALCIFEROL 1.25 MG/1
50000 CAPSULE ORAL WEEKLY
Qty: 8 CAPSULE | Refills: 0 | Status: SHIPPED | OUTPATIENT
Start: 2019-09-10 | End: 2019-10-10

## 2019-09-13 ENCOUNTER — TELEPHONE (OUTPATIENT)
Dept: FAMILY MEDICINE CLINIC | Facility: CLINIC | Age: 59
End: 2019-09-13

## 2019-09-13 DIAGNOSIS — N64.9 BREAST LESION: Primary | ICD-10-CM

## 2019-09-13 NOTE — TELEPHONE ENCOUNTER
Received call from [address] [place] [address] Rd. Patient had repeat mammogram today and requires ultrasound-guided biopsy of the left breast for 2 separate lesions. Order placed. Please fax the order to [facility] ([facility]) imaging.   Patient would like to have this pro

## 2019-09-16 ENCOUNTER — TELEPHONE (OUTPATIENT)
Dept: FAMILY MEDICINE CLINIC | Facility: CLINIC | Age: 59
End: 2019-09-16

## 2019-09-16 NOTE — TELEPHONE ENCOUNTER
Per Kelley, pt has an appt today and need the [facility] 59 for her Breast biopsy fax it to [phone].

## 2019-09-23 ENCOUNTER — TELEPHONE (OUTPATIENT)
Dept: FAMILY MEDICINE CLINIC | Facility: CLINIC | Age: 59
End: 2019-09-23

## 2019-09-23 DIAGNOSIS — R92.8 ABNORMAL MAMMOGRAM OF LEFT BREAST: Primary | ICD-10-CM

## 2019-09-24 NOTE — TELEPHONE ENCOUNTER
Pt informed of recommendations. Pt stts she still has some soreness at the biopsy site, and will monitor it for a few days. Pt will call or schedule an appt if doesn't resolve.

## 2019-10-05 ENCOUNTER — APPOINTMENT (OUTPATIENT)
Dept: ULTRASOUND IMAGING | Facility: HOSPITAL | Age: 59
End: 2019-10-05
Attending: EMERGENCY MEDICINE
Payer: COMMERCIAL

## 2019-10-05 ENCOUNTER — HOSPITAL ENCOUNTER (EMERGENCY)
Facility: HOSPITAL | Age: 59
Discharge: HOME | End: 2019-10-05
Attending: EMERGENCY MEDICINE
Payer: COMMERCIAL

## 2019-10-05 VITALS
OXYGEN SATURATION: 97 % | BODY MASS INDEX: 43.32 KG/M2 | RESPIRATION RATE: 15 BRPM | WEIGHT: 260 LBS | HEART RATE: 61 BPM | SYSTOLIC BLOOD PRESSURE: 145 MMHG | DIASTOLIC BLOOD PRESSURE: 88 MMHG | TEMPERATURE: 98 F | HEIGHT: 65 IN

## 2019-10-05 DIAGNOSIS — M79.605 ACUTE LEG PAIN, LEFT: Primary | ICD-10-CM

## 2019-10-05 PROCEDURE — 83735 ASSAY OF MAGNESIUM: CPT | Performed by: EMERGENCY MEDICINE

## 2019-10-05 PROCEDURE — 85379 FIBRIN DEGRADATION QUANT: CPT | Performed by: EMERGENCY MEDICINE

## 2019-10-05 PROCEDURE — 99284 EMERGENCY DEPT VISIT MOD MDM: CPT

## 2019-10-05 PROCEDURE — 93005 ELECTROCARDIOGRAM TRACING: CPT

## 2019-10-05 PROCEDURE — 96375 TX/PRO/DX INJ NEW DRUG ADDON: CPT

## 2019-10-05 PROCEDURE — 96376 TX/PRO/DX INJ SAME DRUG ADON: CPT

## 2019-10-05 PROCEDURE — 93010 ELECTROCARDIOGRAM REPORT: CPT | Performed by: EMERGENCY MEDICINE

## 2019-10-05 PROCEDURE — 80048 BASIC METABOLIC PNL TOTAL CA: CPT | Performed by: EMERGENCY MEDICINE

## 2019-10-05 PROCEDURE — 85025 COMPLETE CBC W/AUTO DIFF WBC: CPT | Performed by: EMERGENCY MEDICINE

## 2019-10-05 PROCEDURE — 93971 EXTREMITY STUDY: CPT | Performed by: EMERGENCY MEDICINE

## 2019-10-05 PROCEDURE — 96374 THER/PROPH/DIAG INJ IV PUSH: CPT

## 2019-10-05 PROCEDURE — 84484 ASSAY OF TROPONIN QUANT: CPT | Performed by: EMERGENCY MEDICINE

## 2019-10-05 RX ORDER — DEXAMETHASONE SODIUM PHOSPHATE 10 MG/ML
10 INJECTION, SOLUTION INTRAMUSCULAR; INTRAVENOUS ONCE
Status: COMPLETED | OUTPATIENT
Start: 2019-10-05 | End: 2019-10-05

## 2019-10-05 RX ORDER — CYCLOBENZAPRINE HCL 10 MG
10 TABLET ORAL 3 TIMES DAILY PRN
Qty: 20 TABLET | Refills: 0 | Status: SHIPPED | OUTPATIENT
Start: 2019-10-05 | End: 2019-10-12

## 2019-10-05 RX ORDER — HYDROCODONE BITARTRATE AND ACETAMINOPHEN 5; 325 MG/1; MG/1
1 TABLET ORAL EVERY 6 HOURS PRN
Qty: 10 TABLET | Refills: 0 | Status: SHIPPED | OUTPATIENT
Start: 2019-10-05 | End: 2019-10-12

## 2019-10-05 RX ORDER — ORPHENADRINE CITRATE 30 MG/ML
30 INJECTION INTRAMUSCULAR; INTRAVENOUS ONCE
Status: COMPLETED | OUTPATIENT
Start: 2019-10-05 | End: 2019-10-05

## 2019-10-05 RX ORDER — KETOROLAC TROMETHAMINE 15 MG/ML
15 INJECTION, SOLUTION INTRAMUSCULAR; INTRAVENOUS ONCE
Status: COMPLETED | OUTPATIENT
Start: 2019-10-05 | End: 2019-10-05

## 2019-10-05 RX ORDER — METHYLPREDNISOLONE 4 MG/1
TABLET ORAL
Qty: 1 PACKAGE | Refills: 0 | Status: SHIPPED | OUTPATIENT
Start: 2019-10-05 | End: 2019-10-11 | Stop reason: ALTCHOICE

## 2019-10-05 RX ORDER — MORPHINE SULFATE 4 MG/ML
4 INJECTION, SOLUTION INTRAMUSCULAR; INTRAVENOUS ONCE
Status: COMPLETED | OUTPATIENT
Start: 2019-10-05 | End: 2019-10-05

## 2019-10-05 NOTE — ED PROVIDER NOTES
Patient Seen in: [facility] AND [facility] Emergency Department      History   Patient presents with:  Leg Pain    Stated Complaint: leg pain    HPI    76-year-old female presents for complaint of left leg pain. Pain began on Thursday, 2 days ago.   Pain is loca status: Former Smoker        Quit date:         Years since quittin.7      Smokeless tobacco: Never Used    Alcohol use: Yes      Comment: Wine - occasionally    Drug use: No             Review of Systems   Constitutional: Negative.     HENT: Carine Gannon no guarding. Musculoskeletal: Normal range of motion. Left hip: She exhibits tenderness. She exhibits normal range of motion, normal strength, no bony tenderness, no swelling, no crepitus, no deformity and no laceration.         Left knee: She exhi MAGNESIUM - Normal   TROPONIN I - Normal   D-DIMER - Normal   CBC WITH DIFFERENTIAL WITH PLATELET    Narrative: The following orders were created for panel order CBC WITH DIFFERENTIAL WITH PLATELET.   Procedure                               Abnormalit augmentation is visualized on compression. CONCLUSION:  Negative for sonographic evidence of deep venous thrombosis in the left lower extremity.     Dictated by (CST): Nafisa Cunha MD on 10/05/2019 at 11:47     Approved by (CST): Nafisa Cunha

## 2019-10-05 NOTE — ED INITIAL ASSESSMENT (HPI)
Pt presents with left thigh/hip/knee pain since Thursday. States the pain has gotten progressively worse since then and she was unable to sleep last night due to pain.

## 2019-10-09 ENCOUNTER — OFFICE VISIT (OUTPATIENT)
Dept: FAMILY MEDICINE CLINIC | Facility: CLINIC | Age: 59
End: 2019-10-09
Payer: COMMERCIAL

## 2019-10-09 ENCOUNTER — HOSPITAL ENCOUNTER (OUTPATIENT)
Dept: GENERAL RADIOLOGY | Age: 59
Discharge: HOME | End: 2019-10-09
Attending: FAMILY MEDICINE
Payer: COMMERCIAL

## 2019-10-09 VITALS
TEMPERATURE: 98 F | DIASTOLIC BLOOD PRESSURE: 89 MMHG | BODY MASS INDEX: 43 KG/M2 | SYSTOLIC BLOOD PRESSURE: 141 MMHG | WEIGHT: 260 LBS | RESPIRATION RATE: 18 BRPM | HEART RATE: 80 BPM

## 2019-10-09 DIAGNOSIS — M25.552 LEFT HIP PAIN: ICD-10-CM

## 2019-10-09 DIAGNOSIS — M54.42 ACUTE LEFT-SIDED LOW BACK PAIN WITH LEFT-SIDED SCIATICA: ICD-10-CM

## 2019-10-09 DIAGNOSIS — M25.552 LEFT HIP PAIN: Primary | ICD-10-CM

## 2019-10-09 PROCEDURE — 73502 X-RAY EXAM HIP UNI 2-3 VIEWS: CPT | Performed by: FAMILY MEDICINE

## 2019-10-09 PROCEDURE — 72100 X-RAY EXAM L-S SPINE 2/3 VWS: CPT | Performed by: FAMILY MEDICINE

## 2019-10-09 PROCEDURE — 90686 IIV4 VACC NO PRSV 0.5 ML IM: CPT | Performed by: FAMILY MEDICINE

## 2019-10-09 PROCEDURE — 99213 OFFICE O/P EST LOW 20 MIN: CPT | Performed by: FAMILY MEDICINE

## 2019-10-09 PROCEDURE — 90471 IMMUNIZATION ADMIN: CPT | Performed by: FAMILY MEDICINE

## 2019-10-09 NOTE — PROGRESS NOTES
HPI: Parker Dior is a 60year old female who presents for ER follow-up. Pt started having pain in left leg from left hip area down to left knee. Started one week ago. No trigger. Feels like cramping. She feels mild numbness. Gait has changed due to pain.   Has (Oral)   Resp 18   Wt 260 lb (117.9 kg)   BMI 43.27 kg/m²   CV:  Regular rate and rhythm; no murmurs  Lungs:  Clear to ausculation; good aeration               No wheezes, rales or rhonchi  MSK: Abnormal gait.   Using cane to walk and needs to stop and rest

## 2019-10-11 ENCOUNTER — OFFICE VISIT (OUTPATIENT)
Dept: NEUROLOGY | Facility: CLINIC | Age: 59
End: 2019-10-11
Payer: COMMERCIAL

## 2019-10-11 VITALS
RESPIRATION RATE: 17 BRPM | BODY MASS INDEX: 43.32 KG/M2 | SYSTOLIC BLOOD PRESSURE: 136 MMHG | HEART RATE: 92 BPM | WEIGHT: 260 LBS | HEIGHT: 65 IN | DIASTOLIC BLOOD PRESSURE: 84 MMHG

## 2019-10-11 DIAGNOSIS — G47.9 SLEEP DISTURBANCE: ICD-10-CM

## 2019-10-11 DIAGNOSIS — M54.16 ACUTE LEFT LUMBAR RADICULOPATHY: Primary | ICD-10-CM

## 2019-10-11 DIAGNOSIS — R29.898 LEFT LEG WEAKNESS: ICD-10-CM

## 2019-10-11 DIAGNOSIS — E66.01 MORBID OBESITY WITH BMI OF 40.0-44.9, ADULT (HCC): ICD-10-CM

## 2019-10-11 DIAGNOSIS — R29.3 POOR POSTURE: ICD-10-CM

## 2019-10-11 PROCEDURE — 99244 OFF/OP CNSLTJ NEW/EST MOD 40: CPT | Performed by: PHYSICAL MEDICINE & REHABILITATION

## 2019-10-11 RX ORDER — METHYLPREDNISOLONE 4 MG/1
TABLET ORAL
Qty: 1 PACKAGE | Refills: 0 | Status: SHIPPED | OUTPATIENT
Start: 2019-10-11 | End: 2019-10-21 | Stop reason: ALTCHOICE

## 2019-10-11 NOTE — PROGRESS NOTES
[address]  NEW PATIENT EVALUATION    Consultation as a request of Dr. Andrew Carranza    Chief Complaint: left back pain.     HISTORY OF PRESENT ILLNESS:   Patient presents with:  Leg Pain: New pt presents for Left h cancer 2019    repeat CLN in 5 yrs due to hx of polyps          PAST SURGICAL HISTORY:     Past Surgical History:   Procedure Laterality Date   • ANKLE FRACTURE SURGERY Right    • ARTHROPLASTY PATELLA Left    • COLONOSCOPY N/A 9/4/2019    Performed by Tabitha Garcia Smokeless tobacco: Never Used    Alcohol use: Yes      Comment: Wine - occasionally    Drug use: No         REVIEW OF SYSTEMS:   Constitutional: negative for chills, fatigue, fevers and weight loss  Eyes: negative for irritation, redness and visual disturb kyphosis    Musculoskeletal/Neurological Exam:    LUMBAR SPINE:  Inspection: no erythema, swelling, or obvious deformity.   Their iliac crest and shoulder heights are symmetrical.     Palpation: Non tender to palpation of the spinous process, lumbar paraspi 10/05/2019    K 3.9 10/05/2019     10/05/2019    CO2 26.0 10/05/2019     No results found for: PTP, PT, INR  Lab Results   Component Value Date    VITD25 17 (L) 08/26/2019    [id number] 20.4 11/17/2015       IMAGING:     X-Ray LUMBAR SPINE dated Saeed

## 2019-10-14 ENCOUNTER — TELEPHONE (OUTPATIENT)
Dept: NEUROLOGY | Facility: CLINIC | Age: 59
End: 2019-10-14

## 2019-10-14 NOTE — TELEPHONE ENCOUNTER
Cleopatra Cardoza for authorization of approval for MRI L-spine wo cpt code 34635 Approval was given with Authorization # [id number] effective 10/14/19 to 01/12/20. Will call Pt. To inform. Pt. advised of approval. She will call to schedule appt.

## 2019-10-17 ENCOUNTER — HOSPITAL ENCOUNTER (OUTPATIENT)
Dept: MRI IMAGING | Facility: HOSPITAL | Age: 59
Discharge: HOME | End: 2019-10-17
Attending: PHYSICAL MEDICINE & REHABILITATION
Payer: COMMERCIAL

## 2019-10-17 DIAGNOSIS — M54.16 ACUTE LEFT LUMBAR RADICULOPATHY: ICD-10-CM

## 2019-10-17 PROCEDURE — 72148 MRI LUMBAR SPINE W/O DYE: CPT | Performed by: PHYSICAL MEDICINE & REHABILITATION

## 2019-10-18 ENCOUNTER — TELEPHONE (OUTPATIENT)
Dept: NEUROLOGY | Facility: CLINIC | Age: 59
End: 2019-10-18

## 2019-10-18 NOTE — TELEPHONE ENCOUNTER
----- Message from Ana Meier DO sent at 10/18/2019  9:12 AM CDT -----  MRI shows mild disc herniation. Please have patient follow up for further treatment recs.

## 2019-10-21 ENCOUNTER — TELEPHONE (OUTPATIENT)
Dept: NEUROLOGY | Facility: CLINIC | Age: 59
End: 2019-10-21

## 2019-10-21 ENCOUNTER — OFFICE VISIT (OUTPATIENT)
Dept: NEUROLOGY | Facility: CLINIC | Age: 59
End: 2019-10-21
Payer: COMMERCIAL

## 2019-10-21 VITALS
BODY MASS INDEX: 43.15 KG/M2 | DIASTOLIC BLOOD PRESSURE: 92 MMHG | SYSTOLIC BLOOD PRESSURE: 160 MMHG | WEIGHT: 259 LBS | RESPIRATION RATE: 18 BRPM | HEIGHT: 65 IN | HEART RATE: 104 BPM

## 2019-10-21 DIAGNOSIS — G47.9 SLEEP DISTURBANCE: ICD-10-CM

## 2019-10-21 DIAGNOSIS — R29.3 POOR POSTURE: ICD-10-CM

## 2019-10-21 DIAGNOSIS — E66.01 MORBID OBESITY WITH BMI OF 40.0-44.9, ADULT (HCC): ICD-10-CM

## 2019-10-21 DIAGNOSIS — M54.16 ACUTE LEFT LUMBAR RADICULOPATHY: ICD-10-CM

## 2019-10-21 DIAGNOSIS — M25.552 ACUTE HIP PAIN, LEFT: Primary | ICD-10-CM

## 2019-10-21 PROCEDURE — 99214 OFFICE O/P EST MOD 30 MIN: CPT | Performed by: PHYSICAL MEDICINE & REHABILITATION

## 2019-10-21 RX ORDER — MELOXICAM 15 MG/1
15 TABLET ORAL DAILY
Qty: 14 TABLET | Refills: 0 | Status: SHIPPED | OUTPATIENT
Start: 2019-10-21 | End: 2019-11-04

## 2019-10-21 NOTE — PATIENT INSTRUCTIONS
-MRI of the left hip and follow up after  -Tylenol and Mobic   -Will discuss injection in the hip joint pending above

## 2019-10-21 NOTE — PROGRESS NOTES
[address] [facility]  NEW PATIENT EVALUATION    Chief Complaint: left back pain. HISTORY OF PRESENT ILLNESS:   Patient presents with:  Hip Pain: LOV 10/11/19 for left hip pain radiating to left knee.  f/u for Lumbar shooting in nature. She denies any loss of bowel bladder control. She does endorse weakness as well as numbness in the anterior thigh. Patient had x-ray imaging of the left hip and lumbar spine completed by Dr. Chikis Isabel which is noted below.       PAST MED HISTORY:   Social History    Tobacco Use      Smoking status: Former Smoker        Quit date:         Years since quittin.8      Smokeless tobacco: Never Used    Alcohol use: Yes      Comment: Wine - occasionally    Drug use: No         REVIEW OF heel walk without any difficulty  Posture: No scoliosis or kyphosis    Musculoskeletal/Neurological Exam:    LUMBAR SPINE:  Inspection: no erythema, swelling, or obvious deformity.   Their iliac crest and shoulder heights are symmetrical.     Palpation: Non 4.0 (H) 01/17/2015    AGRATIO 0.9 (L) 01/17/2015     10/05/2019    K 3.9 10/05/2019     10/05/2019    CO2 26.0 10/05/2019     No results found for: PTP, PT, INR  Lab Results   Component Value Date    VITD25 17 (L) 08/26/2019    [id number] 20.4 11

## 2019-10-21 NOTE — TELEPHONE ENCOUNTER
[facility] for MRI HIPS, LEFT CPT Code 12019. Spoke to Pau VIGIL who states no authorization is required. Reference call# [email or web address] Will inform patient.  Patient informed, insurance was verified and request above is

## 2019-10-23 ENCOUNTER — PATIENT MESSAGE (OUTPATIENT)
Dept: FAMILY MEDICINE CLINIC | Facility: CLINIC | Age: 59
End: 2019-10-23

## 2019-10-24 NOTE — TELEPHONE ENCOUNTER
From: Ivy Rueda  To: Rajeev Becker DO  Sent: 10/23/2019 10:34 AM CDT  Subject: Other    I have went to Dr. Dilshad Buenrostro and I don't have anything related with my spine that's causing my pain in my leg do you think I should make an appointment with the doctor

## 2019-10-25 ENCOUNTER — TELEPHONE (OUTPATIENT)
Dept: FAMILY MEDICINE CLINIC | Facility: CLINIC | Age: 59
End: 2019-10-25

## 2019-10-25 NOTE — TELEPHONE ENCOUNTER
LOV 10/9/19 ER FU due to L leg pain.   Spoke with patient ,states that she is schedule for MRI L hip on 11/6/19 and still with  excruciating pain right now, 10/10, started from L hip going to the groin and then inner /inside of the leg up to the knee only,s

## 2019-10-31 ENCOUNTER — HOSPITAL ENCOUNTER (OUTPATIENT)
Dept: MRI IMAGING | Age: 59
Discharge: HOME | End: 2019-10-31
Attending: PHYSICAL MEDICINE & REHABILITATION
Payer: COMMERCIAL

## 2019-10-31 DIAGNOSIS — M25.552 ACUTE HIP PAIN, LEFT: ICD-10-CM

## 2019-10-31 PROCEDURE — 73721 MRI JNT OF LWR EXTRE W/O DYE: CPT | Performed by: PHYSICAL MEDICINE & REHABILITATION

## 2019-11-01 ENCOUNTER — TELEPHONE (OUTPATIENT)
Dept: NEUROLOGY | Facility: CLINIC | Age: 59
End: 2019-11-01

## 2019-11-01 ENCOUNTER — TELEPHONE (OUTPATIENT)
Dept: FAMILY MEDICINE CLINIC | Facility: CLINIC | Age: 59
End: 2019-11-01

## 2019-11-01 DIAGNOSIS — M25.552 LEFT HIP PAIN: Primary | ICD-10-CM

## 2019-11-01 DIAGNOSIS — R21 FACIAL RASH: ICD-10-CM

## 2019-11-01 DIAGNOSIS — M54.42 CHRONIC LEFT-SIDED LOW BACK PAIN WITH LEFT-SIDED SCIATICA: ICD-10-CM

## 2019-11-01 DIAGNOSIS — G89.29 CHRONIC LEFT-SIDED LOW BACK PAIN WITH LEFT-SIDED SCIATICA: ICD-10-CM

## 2019-11-01 NOTE — TELEPHONE ENCOUNTER
----- Message from Cynthia Lipscomb DO sent at 10/31/2019  4:39 PM CDT -----  MRI shows mild strain of the tendon in the buttock and the adductor. Please have patient follow up for further treatment recs.

## 2019-11-01 NOTE — TELEPHONE ENCOUNTER
Gibson Wilson,    No Flavia Campo is needed, although you do need to submit new referrals for both doctors so it will fall under her new insurance.     They will auto approve since the are elder of [place].    Thank you  Maria L Garcias

## 2019-11-01 NOTE — TELEPHONE ENCOUNTER
Informed patient of imaging results and recommendation to f/u. Patient verbalized understanding. Transferred to [facility] to schedule f/u appt.

## 2019-11-01 NOTE — TELEPHONE ENCOUNTER
Managed care, both referrals are on file. Derm 06/17/2019 has not been used and Dr. Hughes Kidney 10/09/2019. Is PA needed. Insurance has changed since order were generated.   Please reply to pool: EM TRIAGE SUPPORT

## 2019-11-01 NOTE — TELEPHONE ENCOUNTER
Patient states she has an appointment with Dr. Jose Francisco Rubin (Demotology) Monday 11/04. Patient states she recently updated her health insurance from [place] to [facility] and is asking would she need an updated referral to see Dr. Jim Gomez due to the insurance chance. Patient is also requesting an updated referral for Dr. Ej Pearson (Neuroscience)    Please advise.

## 2019-11-04 ENCOUNTER — OFFICE VISIT (OUTPATIENT)
Dept: DERMATOLOGY CLINIC | Facility: CLINIC | Age: 59
End: 2019-11-04
Payer: COMMERCIAL

## 2019-11-04 DIAGNOSIS — B08.1 MOLLUSCUM CONTAGIOSUM: Primary | ICD-10-CM

## 2019-11-04 PROCEDURE — 99201 OFFICE/OUTPT VISIT,NEW,LEVL I: CPT | Performed by: DERMATOLOGY

## 2019-11-04 PROCEDURE — 17110 DESTRUCTION B9 LES UP TO 14: CPT | Performed by: DERMATOLOGY

## 2019-11-04 RX ORDER — IMIQUIMOD 12.5 MG/.25G
CREAM TOPICAL
Qty: 24 PACKET | Refills: 1 | Status: SHIPPED | OUTPATIENT
Start: 2019-11-04

## 2019-11-04 NOTE — PROGRESS NOTES
HPI:     Chief Complaint     Rash        HPI     Rash      Additional comments: New Patient present with rash on face Patient treated rash with hydrocortisone with no relief           Last edited by Jonah Grider, [address]oma Calloway on 11/4/2019 11:18 AM. (History) cholecalciferol (D 1000) 1000 UNITS Oral Cap Take  by mouth. • Multiple Vitamin (MULTIVITAMINS) Oral Cap Take  by mouth.        Allergies:   No Known Allergies    Past Medical History:   Diagnosis Date   • Gigantomastia    • Lipoma    • Pneumonia due to Active member of club or organization: Not on file        Attends meetings of clubs or organizations: Not on file        Relationship status: Not on file      Intimate partner violence:        Fear of current or ex partner: Not on file        Emotionally hydrocortisone. I have told her that if she was not so dark complected we would freeze with liquid nitrogen but I am concerned about permanent hypopigmentation. Therefore just the neck lesions were treated with cryotherapy.   For the facial lesions we carlos

## 2019-11-05 NOTE — TELEPHONE ENCOUNTER
Patient viewed [employer] message. MRI done on 10/31/19 and following up with Dr Nitesh Grant and schedule to see the specialist on 11/7/19, no ER encounter.       From  Suzy Dewitt RN To  Vernon Agosto and Delivered  10/26/2019  9:18 AM   Last Read in [facility]

## 2019-11-07 ENCOUNTER — TELEPHONE (OUTPATIENT)
Dept: NEUROLOGY | Facility: CLINIC | Age: 59
End: 2019-11-07

## 2019-11-07 ENCOUNTER — OFFICE VISIT (OUTPATIENT)
Dept: NEUROLOGY | Facility: CLINIC | Age: 59
End: 2019-11-07
Payer: COMMERCIAL

## 2019-11-07 VITALS
DIASTOLIC BLOOD PRESSURE: 78 MMHG | HEIGHT: 65 IN | SYSTOLIC BLOOD PRESSURE: 140 MMHG | BODY MASS INDEX: 43.32 KG/M2 | HEART RATE: 92 BPM | RESPIRATION RATE: 18 BRPM | WEIGHT: 260 LBS

## 2019-11-07 DIAGNOSIS — M25.552 ACUTE HIP PAIN, LEFT: ICD-10-CM

## 2019-11-07 DIAGNOSIS — M70.62 GREATER TROCHANTERIC BURSITIS OF LEFT HIP: Primary | ICD-10-CM

## 2019-11-07 DIAGNOSIS — R92.8 ABNORMAL MAMMOGRAM OF LEFT BREAST: Primary | ICD-10-CM

## 2019-11-07 DIAGNOSIS — E66.01 MORBID OBESITY WITH BMI OF 40.0-44.9, ADULT (HCC): ICD-10-CM

## 2019-11-07 DIAGNOSIS — G47.9 SLEEP DISTURBANCE: ICD-10-CM

## 2019-11-07 PROCEDURE — 99214 OFFICE O/P EST MOD 30 MIN: CPT | Performed by: PHYSICAL MEDICINE & REHABILITATION

## 2019-11-07 NOTE — TELEPHONE ENCOUNTER
Mila for authorization of approval of Ultrasound guided CSI left greater trochanteric bursa cpt codes 62947, [id number],   Talked to Gonzalo Garza. who sates no authorization is required. Reference # [id number]. Will  inform Nursing.

## 2019-11-07 NOTE — PROGRESS NOTES
[address] [facility]  NEW PATIENT EVALUATION    Chief Complaint: left back pain. HISTORY OF PRESENT ILLNESS:   Patient presents with:  Hip Pain: LOV 10/21/19 f/u for left hip pain radiating down thigh to knee.  Here history of left patellar tendon rupture s/p repair, right ankle fracture s/p surgical fixation who presents with left leg pain. She denies any injury or trauma. Endorses that several days ago she woke up with sudden onset onset of left leg pain.   She den g 3   • Imiquimod (ALDARA) 5 % External Cream Apply to affected areas every other day 24 packet 1   • hydrocortisone 2.5 % External Cream Apply 1 Application topically 2 (two) times daily. 30 g 1   • TURMERIC OR Take by mouth.      • ZINC OR Use as directed negative for diabetic symptoms including blurry vision, polydipsia, polyphagia and polyuria  Allergic/Immunologic: negative for urticaria      PHYSICAL EXAM:   /78   Pulse 92   Resp 18   Ht 65\"   Wt 260 lb (117.9 kg)   BMI 43.27 kg/m²   General: No 10/05/2019    GFRAA 105 10/05/2019    CA 9.3 10/05/2019    OSMOCALC 296 (H) 10/05/2019    AST 30 01/17/2015    ALT 22 01/17/2015    ALKPHOS 51 01/17/2015    BILT 0.4 01/17/2015    TP 7.5 01/17/2015    ALB 3.5 01/17/2015    GLOBULIN 4.0 (H) 01/17/2015    AG osteoarthritic changes along the pubic symphysis. At this time, her examination is consistent with a greater trochanteric bursitis that she has tenderness to palpation in the area as well as difficulty sleeping on the left side.   I recommended that we per

## 2019-11-07 NOTE — PATIENT INSTRUCTIONS
-Start physical therapy and home exercises  -Ice/Heat as tolerated  -My office will call when injection is approved  -Follow up in 4 weeks

## 2019-11-08 ENCOUNTER — TELEPHONE (OUTPATIENT)
Dept: FAMILY MEDICINE CLINIC | Facility: CLINIC | Age: 59
End: 2019-11-08

## 2019-11-08 NOTE — TELEPHONE ENCOUNTER
Mammogram order faxed to [facility] ([facility]) Breast center, due in 6 months. Copy mailed to pt.

## 2019-11-14 ENCOUNTER — OFFICE VISIT (OUTPATIENT)
Dept: NEUROLOGY | Facility: CLINIC | Age: 59
End: 2019-11-14

## 2019-11-14 VITALS
RESPIRATION RATE: 20 BRPM | BODY MASS INDEX: 43.32 KG/M2 | WEIGHT: 260 LBS | DIASTOLIC BLOOD PRESSURE: 76 MMHG | SYSTOLIC BLOOD PRESSURE: 130 MMHG | HEART RATE: 80 BPM | HEIGHT: 65 IN

## 2019-11-14 DIAGNOSIS — M70.62 GREATER TROCHANTERIC BURSITIS OF LEFT HIP: Primary | ICD-10-CM

## 2019-11-14 PROCEDURE — 20611 DRAIN/INJ JOINT/BURSA W/US: CPT | Performed by: PHYSICAL MEDICINE & REHABILITATION

## 2019-11-14 RX ORDER — LIDOCAINE HYDROCHLORIDE 10 MG/ML
3 INJECTION, SOLUTION INFILTRATION; PERINEURAL ONCE
Status: COMPLETED | OUTPATIENT
Start: 2019-11-14 | End: 2019-11-14

## 2019-11-14 RX ORDER — TRIAMCINOLONE ACETONIDE 40 MG/ML
40 INJECTION, SUSPENSION INTRA-ARTICULAR; INTRAMUSCULAR ONCE
Status: COMPLETED | OUTPATIENT
Start: 2019-11-14 | End: 2019-11-14

## 2019-11-14 RX ORDER — LIDOCAINE HYDROCHLORIDE 10 MG/ML
4 INJECTION, SOLUTION INFILTRATION; PERINEURAL ONCE
Status: COMPLETED | OUTPATIENT
Start: 2019-11-14 | End: 2019-11-14

## 2019-11-14 NOTE — PROCEDURES
Procedure:  Ultrasound guided left greater trochanteric bursa injection  The risks, benefits and anticipated outcomes of the procedure, the risks and benefits of the alternatives to the procedure, and the roles and tasks of the personnel to be involved, we

## 2019-11-19 ENCOUNTER — MED REC SCAN ONLY (OUTPATIENT)
Dept: NEUROLOGY | Facility: CLINIC | Age: 59
End: 2019-11-19

## 2019-11-26 ENCOUNTER — OFFICE VISIT (OUTPATIENT)
Dept: PHYSICAL THERAPY | Age: 59
End: 2019-11-26
Attending: PHYSICAL MEDICINE & REHABILITATION
Payer: COMMERCIAL

## 2019-11-26 DIAGNOSIS — M70.62 GREATER TROCHANTERIC BURSITIS OF LEFT HIP: ICD-10-CM

## 2019-11-26 PROCEDURE — 97162 PT EVAL MOD COMPLEX 30 MIN: CPT | Performed by: PHYSICAL THERAPIST

## 2019-11-26 PROCEDURE — 97110 THERAPEUTIC EXERCISES: CPT | Performed by: PHYSICAL THERAPIST

## 2019-11-26 NOTE — PROGRESS NOTES
HIP EVALUATION:   Referring Physician: Dr. Margareth Kimble  Diagnosis: Greater trochanteric bursitis of left hip (M70.62)      Onset Date Sept 2019 Evaluation date: 11/26/2019  Visit # 1  Scheduled Visits 8  Insurance Authorized visits 8 O       PATIENT SUMMARY stair negotiation and constant pain. Functional deficits include but are not limited to walking, sitting, standing, donning/doffing shoes/sock stairs and sit to stand transfers. Pt and PT discussed evaluation findings, pathology, POC and HEP.   Pt voiced involved Moderate Complexity decision making due to 3+ personal factors/comorbidities, 4+ body structures involved/activity limitations, and evolving symptoms including changing pain levels. PLAN OF CARE:    Goals: To be met in 4-6 weeks  1.  Pt t Date____________________    Certification From: 06/11/8945  To:2/24/2020

## 2019-12-03 ENCOUNTER — OFFICE VISIT (OUTPATIENT)
Dept: PHYSICAL THERAPY | Age: 59
End: 2019-12-03
Attending: PHYSICAL MEDICINE & REHABILITATION
Payer: COMMERCIAL

## 2019-12-03 ENCOUNTER — PATIENT MESSAGE (OUTPATIENT)
Dept: FAMILY MEDICINE CLINIC | Facility: CLINIC | Age: 59
End: 2019-12-03

## 2019-12-03 DIAGNOSIS — M70.72 BURSITIS OF LEFT HIP, UNSPECIFIED BURSA: Primary | ICD-10-CM

## 2019-12-03 PROCEDURE — 97110 THERAPEUTIC EXERCISES: CPT | Performed by: PHYSICAL THERAPIST

## 2019-12-03 PROCEDURE — 97140 MANUAL THERAPY 1/> REGIONS: CPT | Performed by: PHYSICAL THERAPIST

## 2019-12-03 NOTE — PROGRESS NOTES
Dx: Greater trochanteric bursitis of left hip (M70.62)             Insurance (Authorized # of Visits):  8 O           Authorizing Physician: Dr. Eda Hilliard  Next MD visit: none scheduled  Fall Risk: standard         Precautions: n/a             Subjective: Pt

## 2019-12-04 NOTE — TELEPHONE ENCOUNTER
From: Chacorta Bar  To: Henna Coy DO  Sent: 12/3/2019 2:21 PM CST  Subject: Referral Request    I have an follow up appointment with Dr Jenni Grullon next week and I need a referral for my insurance

## 2019-12-06 ENCOUNTER — OFFICE VISIT (OUTPATIENT)
Dept: PHYSICAL THERAPY | Age: 59
End: 2019-12-06
Attending: PHYSICAL MEDICINE & REHABILITATION
Payer: COMMERCIAL

## 2019-12-06 PROCEDURE — 97110 THERAPEUTIC EXERCISES: CPT | Performed by: PHYSICAL THERAPIST

## 2019-12-06 PROCEDURE — 97140 MANUAL THERAPY 1/> REGIONS: CPT | Performed by: PHYSICAL THERAPIST

## 2019-12-06 NOTE — PROGRESS NOTES
Dx: Greater trochanteric bursitis of left hip (M70.62)             Insurance (Authorized # of Visits):  8 O           Authorizing Physician: Dr. Torri Jordan  Next MD visit: none scheduled  Fall Risk: standard         Precautions: n/a             Subjective: Pt Manual ITB to L x 10 min ITB to L x 10 min                      HEP: 11/26/19 RFIS     Charges: 2 therex, 1 man       Total Timed Treatment: 40 min  Total Treatment Time: 40 min

## 2019-12-11 ENCOUNTER — OFFICE VISIT (OUTPATIENT)
Dept: PHYSICAL THERAPY | Age: 59
End: 2019-12-11
Attending: PHYSICAL MEDICINE & REHABILITATION
Payer: COMMERCIAL

## 2019-12-11 PROCEDURE — 97140 MANUAL THERAPY 1/> REGIONS: CPT | Performed by: PHYSICAL THERAPIST

## 2019-12-11 PROCEDURE — 97110 THERAPEUTIC EXERCISES: CPT | Performed by: PHYSICAL THERAPIST

## 2019-12-11 NOTE — PROGRESS NOTES
Dx: Greater trochanteric bursitis of left hip (M70.62)             Insurance (Authorized # of Visits):  8 O           Authorizing Physician: Dr. Yee García  Next MD visit: none scheduled  Fall Risk: standard         Precautions: n/a             Subjective: Pt x20    Hooklying add with ball 5sec x20     RFIS x20   Nustep UE/LE L5 x 6min  Slouch/OC x20 (NE)  LTR x20  Glut sets 5sec x20  Hooklying abd with fitness [place] 5sec x20    Hooklying add with ball 5sec x20  Seated nerve tension x15 R/L      FOTO   Manual

## 2019-12-12 ENCOUNTER — OFFICE VISIT (OUTPATIENT)
Dept: NEUROLOGY | Facility: CLINIC | Age: 59
End: 2019-12-12

## 2019-12-12 VITALS
HEART RATE: 88 BPM | BODY MASS INDEX: 43.15 KG/M2 | HEIGHT: 65 IN | RESPIRATION RATE: 18 BRPM | WEIGHT: 259 LBS | SYSTOLIC BLOOD PRESSURE: 140 MMHG | DIASTOLIC BLOOD PRESSURE: 82 MMHG

## 2019-12-12 DIAGNOSIS — S76.212D STRAIN OF ADDUCTOR MAGNUS MUSCLE OF LEFT LOWER EXTREMITY, SUBSEQUENT ENCOUNTER: ICD-10-CM

## 2019-12-12 DIAGNOSIS — M70.62 GREATER TROCHANTERIC BURSITIS OF LEFT HIP: Primary | ICD-10-CM

## 2019-12-12 DIAGNOSIS — E66.01 MORBID OBESITY WITH BMI OF 40.0-44.9, ADULT (HCC): ICD-10-CM

## 2019-12-12 DIAGNOSIS — G47.9 SLEEP DISTURBANCE: ICD-10-CM

## 2019-12-12 PROBLEM — S76.212A STRAIN OF ADDUCTOR MAGNUS MUSCLE OF LEFT LOWER EXTREMITY: Status: ACTIVE | Noted: 2019-12-12

## 2019-12-12 PROCEDURE — 99213 OFFICE O/P EST LOW 20 MIN: CPT | Performed by: PHYSICAL MEDICINE & REHABILITATION

## 2019-12-12 NOTE — PROGRESS NOTES
[address]  NEW PATIENT EVALUATION    Chief Complaint: left back pain.     HISTORY OF PRESENT ILLNESS:   Patient presents with:  Hip Pain: LOV 11/14/19 Ultrasound guided left greater trochanteric bursa inject had any recent physical therapy for this problem. This is affecting her ability to function and stay active. Patient is from a lose weight and having a hard time as result of this pain. 10/21/19  Patient is here for follow-up of left hip pain.   She co the left hip and lumbar spine completed by Dr. Portia Orona which is noted below.       PAST MEDICAL HISTORY:     Past Medical History:   Diagnosis Date   • Gigantomastia    • Lipoma    • Pneumonia due to organism 2018   • Screen for colon cancer 2019    repeat C Quit date:         Years since quittin.9      Smokeless tobacco: Never Used    Alcohol use: Yes      Comment: Wine - occasionally    Drug use: No         REVIEW OF SYSTEMS:   Constitutional: negative for chills, fatigue, fevers and weight loss kyphosis    Musculoskeletal/Neurological Exam:    HIP EXAM:       Right  Left     Hip flexion      120  IR       30   ER       50    Single Leg Trendelenburg             Positive Positive  Anterior impingement   Negative  Negative    LATIA    Negative  Neg 1. Mild strains of the left gluteus charlie muscle and left hip adductor muscle group origin. 2. Stable mild pubic symphysis osteoarthritis. 3. Otherwise unremarkable MRI of the left hip.     X-Ray LUMBAR SPINE dated October 9, 2019 revealed:   ==

## 2019-12-13 ENCOUNTER — OFFICE VISIT (OUTPATIENT)
Dept: PHYSICAL THERAPY | Age: 59
End: 2019-12-13
Attending: PHYSICAL MEDICINE & REHABILITATION
Payer: COMMERCIAL

## 2019-12-13 ENCOUNTER — TELEPHONE (OUTPATIENT)
Dept: FAMILY MEDICINE CLINIC | Facility: CLINIC | Age: 59
End: 2019-12-13

## 2019-12-13 ENCOUNTER — TELEPHONE (OUTPATIENT)
Dept: NEUROLOGY | Facility: CLINIC | Age: 59
End: 2019-12-13

## 2019-12-13 DIAGNOSIS — M70.62 GREATER TROCHANTERIC BURSITIS OF LEFT HIP: Primary | ICD-10-CM

## 2019-12-13 DIAGNOSIS — R21 FACIAL RASH: Primary | ICD-10-CM

## 2019-12-13 PROCEDURE — 97140 MANUAL THERAPY 1/> REGIONS: CPT | Performed by: PHYSICAL THERAPIST

## 2019-12-13 PROCEDURE — 97110 THERAPEUTIC EXERCISES: CPT | Performed by: PHYSICAL THERAPIST

## 2019-12-13 NOTE — PROGRESS NOTES
ProgressSummary  Pt has attended 5 visits in Physical Therapy.    PATIENT HAS HMO AND NEEDS APPROVAL FOR 2 MORE IN 2019 AND 8 MORE FOR 2020    Dx: Greater trochanteric bursitis of left hip (M70.62)             Insurance (Authorized # of Visits):  8 HMO management of their symptoms.-ongoing  2. Pt to return to ADL's/previous activity level with pain  <1 /10.-ongoing  3.  Pt will have improved hip AROM Flex to 100 deg and ABD to 40 deg to be able to don/doff shoes and perform car transfers without difficult Torres Martinez 5sec x20    Hooklying add with ball 5sec x20     RFIS x20   Nustep UE/LE L5 x 6min  L hip extension at stairs x10 (dec hip, B)  Slouch/OC x20 (NE)  HAY x10 (inc L hip, NW)  LTR x20  Glut sets 5sec x20  Hooklying abd with fitness Torres Martinez 5sec x20

## 2019-12-13 NOTE — TELEPHONE ENCOUNTER
Dr. Jacinto Homes, patient is requesting a referral for Dr. Al Barton. Patient is scheduled for 12/16/2019. Referral has been pended, please advise.      Please reply to pool: EM TRIAGE SUPPORT

## 2019-12-16 ENCOUNTER — OFFICE VISIT (OUTPATIENT)
Dept: DERMATOLOGY CLINIC | Facility: CLINIC | Age: 59
End: 2019-12-16
Payer: COMMERCIAL

## 2019-12-16 DIAGNOSIS — B08.1 MOLLUSCUM CONTAGIOSUM: Primary | ICD-10-CM

## 2019-12-16 PROCEDURE — 99212 OFFICE O/P EST SF 10 MIN: CPT | Performed by: DERMATOLOGY

## 2019-12-16 PROCEDURE — 17110 DESTRUCTION B9 LES UP TO 14: CPT | Performed by: DERMATOLOGY

## 2019-12-16 NOTE — PROGRESS NOTES
HPI:     Chief Complaint     Molluscum Contagiosum        HPI     Molluscum Contagiosum      Additional comments: LOV 11/4/2019. Pt presenting for f/u with molluscum to face.  Previously treated at [address] [place] [address] with cryo and pts states using Tretinoin every day and Allergies    Past Medical History:   Diagnosis Date   • Gigantomastia    • Lipoma    • Pneumonia due to organism 2018   • Screen for colon cancer 2019    repeat CLN in 5 yrs due to hx of polyps      Past Surgical History:   Procedure Laterality Date   • AN file      Intimate partner violence:        Fear of current or ex partner: Not on file        Emotionally abused: Not on file        Physically abused: Not on file        Forced sexual activity: Not on file    Other Topics      Concerns:         Se with her next visit    Orders Placed This Encounter      dest benign <15      Results From Past 48 Hours:  No results found for this or any previous visit (from the past 48 hour(s)).     Meds This Visit:      Imaging Orders:  None     Referral Orders:  No o

## 2019-12-18 ENCOUNTER — OFFICE VISIT (OUTPATIENT)
Dept: PHYSICAL THERAPY | Age: 59
End: 2019-12-18
Attending: PHYSICAL MEDICINE & REHABILITATION
Payer: COMMERCIAL

## 2019-12-18 PROCEDURE — 97110 THERAPEUTIC EXERCISES: CPT | Performed by: PHYSICAL THERAPIST

## 2019-12-18 NOTE — PROGRESS NOTES
Dx: Greater trochanteric bursitis of left hip (M70.62)             Insurance (Authorized # of Visits):  [facility] Physician: Dr. Cody Grant  Next MD visit: none scheduled  Fall Risk: standard         Precautions: n/a             Subjective (NE)  LTR x20  Glut sets 5sec x20  Hooklying abd with fitness [place] 5sec x20    Hooklying add with ball 5sec x20  Seated nerve tension x15 R/L     RFIS x20   Nustep UE/LE L5 x 6min  Slouch/OC x20 (NE)  LTR x20  Glut sets 5sec x20  Hooklying abd with fitne

## 2019-12-18 NOTE — TELEPHONE ENCOUNTER
Contacted patient to inform or order placed. Patient stated she has gone to her appointment which was on 12/16/19. Patient also stated she has a returning appointment for next month and would like another referral to be put in for an additional visit to see Dr Jama Sykes. Managed care can you please update the patient's referral with additional visit's or do we need to place another order for this follow up visit.

## 2019-12-20 ENCOUNTER — OFFICE VISIT (OUTPATIENT)
Dept: PHYSICAL THERAPY | Age: 59
End: 2019-12-20
Attending: PHYSICAL MEDICINE & REHABILITATION
Payer: COMMERCIAL

## 2019-12-20 PROCEDURE — 97110 THERAPEUTIC EXERCISES: CPT | Performed by: PHYSICAL THERAPIST

## 2019-12-20 NOTE — PROGRESS NOTES
Dx: Greater trochanteric bursitis of left hip (M70.62)             Insurance (Authorized # of Visits):  [address] [facility] Physician: Dr. July Spencer  Next MD visit: none scheduled  Fall Risk: standard         Precautions: n/a             Subjective

## 2019-12-23 ENCOUNTER — APPOINTMENT (OUTPATIENT)
Dept: PHYSICAL THERAPY | Age: 59
End: 2019-12-23
Attending: PHYSICAL MEDICINE & REHABILITATION
Payer: COMMERCIAL

## 2019-12-23 ENCOUNTER — TELEPHONE (OUTPATIENT)
Dept: PHYSICAL THERAPY | Age: 59
End: 2019-12-23

## 2019-12-26 ENCOUNTER — APPOINTMENT (OUTPATIENT)
Dept: PHYSICAL THERAPY | Age: 59
End: 2019-12-26
Attending: PHYSICAL MEDICINE & REHABILITATION
Payer: COMMERCIAL

## 2019-12-26 ENCOUNTER — TELEPHONE (OUTPATIENT)
Dept: PHYSICAL THERAPY | Age: 59
End: 2019-12-26

## 2019-12-30 ENCOUNTER — OFFICE VISIT (OUTPATIENT)
Dept: PHYSICAL THERAPY | Age: 59
End: 2019-12-30
Attending: PHYSICAL MEDICINE & REHABILITATION
Payer: COMMERCIAL

## 2019-12-30 PROCEDURE — 97110 THERAPEUTIC EXERCISES: CPT | Performed by: PHYSICAL THERAPIST

## 2019-12-30 NOTE — PROGRESS NOTES
Dx: Greater trochanteric bursitis of left hip (M70.62)             Insurance (Authorized # of Visits):  [address] [facility] Physician: Dr. Mackenzie Salter MD visit: none scheduled  Fall Risk: standard         Precautions: n/a             Subjective x20  Hooklying abd with fitness [place] 5sec x20    Hooklying add with ball 5sec x20    Seated nerve tension x15 R/L              Manual ITB to L x 10 min                       HEP: 12/11/19 seated nerve tension (see handout)  11/26/19 RFIS     Charges: 2 t

## 2020-01-02 ENCOUNTER — APPOINTMENT (OUTPATIENT)
Dept: PHYSICAL THERAPY | Age: 60
End: 2020-01-02
Attending: PHYSICAL MEDICINE & REHABILITATION
Payer: COMMERCIAL

## 2020-01-06 ENCOUNTER — OFFICE VISIT (OUTPATIENT)
Dept: PHYSICAL THERAPY | Age: 60
End: 2020-01-06
Attending: PHYSICAL MEDICINE & REHABILITATION
Payer: COMMERCIAL

## 2020-01-06 PROCEDURE — 97110 THERAPEUTIC EXERCISES: CPT | Performed by: PHYSICAL THERAPIST

## 2020-01-06 PROCEDURE — 97140 MANUAL THERAPY 1/> REGIONS: CPT | Performed by: PHYSICAL THERAPIST

## 2020-01-06 NOTE — PROGRESS NOTES
Dx: Greater trochanteric bursitis of left hip (M70.62)             Insurance (Authorized # of Visits):  [facility] Physician: Dr. Mackenzie Salter MD visit: none scheduled  Fall Risk: standard         Precautions: n/a             Subjective R/L     Nustep UE/LE L5 x 6min  Seated hip march x20  Slouch/OC x20 (NE)  Glut sets 5sec x20  Hooklying abd with fitness [place] 5sec x20    Hooklying add with ball 5sec x20    Seated nerve tension x15 R/L             Manual ITB to L x 10 min  ITB to L x 10

## 2020-01-10 ENCOUNTER — OFFICE VISIT (OUTPATIENT)
Dept: PHYSICAL THERAPY | Age: 60
End: 2020-01-10
Attending: PHYSICAL MEDICINE & REHABILITATION
Payer: COMMERCIAL

## 2020-01-10 PROCEDURE — 97110 THERAPEUTIC EXERCISES: CPT

## 2020-01-10 PROCEDURE — 97140 MANUAL THERAPY 1/> REGIONS: CPT

## 2020-01-10 NOTE — PROGRESS NOTES
Dx: Greater trochanteric bursitis of left hip (M70.62)             Insurance (Authorized # of Visits):  16 O  (exp 3/31/20)       Authorizing Physician: Dr. Betsey Perea  Next MD visit: none scheduled  Fall Risk: standard         Precautions: n/a effect of use of HP for pain control. Continue with manual interventions as indicated. Continue with gentle stretching and stability.        Date: 12/20/19  TX#: 7/10 Date: 12/30/19             TX#: 8/8 Date: 1/6/20            TX#: 9 (1/8) Date: 1/10/20

## 2020-01-13 ENCOUNTER — OFFICE VISIT (OUTPATIENT)
Dept: PHYSICAL THERAPY | Age: 60
End: 2020-01-13
Attending: PHYSICAL MEDICINE & REHABILITATION
Payer: COMMERCIAL

## 2020-01-13 PROCEDURE — 97140 MANUAL THERAPY 1/> REGIONS: CPT

## 2020-01-13 PROCEDURE — 97110 THERAPEUTIC EXERCISES: CPT

## 2020-01-13 NOTE — PROGRESS NOTES
Dx: Greater trochanteric bursitis of left hip (M70.62)             Insurance (Authorized # of Visits):  16 O  (exp 3/31/20)       Authorizing Physician: Dr. Yuko Meza  Next MD visit: none scheduled  Fall Risk: standard         Precautions: n/a Continue with manual interventions. Continue with gentle progressive strengthening as able.        Date: 12/20/19  TX#: 7/10 Date: 12/30/19             TX#: 8/8 Date: 1/6/20            TX#: 9 (1/8) Date: 1/10/20              TX: 10 (2/8) Date: 1/13/20  Tx#:

## 2020-01-14 ENCOUNTER — APPOINTMENT (OUTPATIENT)
Dept: PHYSICAL THERAPY | Age: 60
End: 2020-01-14
Attending: PHYSICAL MEDICINE & REHABILITATION
Payer: COMMERCIAL

## 2020-01-17 ENCOUNTER — OFFICE VISIT (OUTPATIENT)
Dept: PHYSICAL THERAPY | Age: 60
End: 2020-01-17
Attending: PHYSICAL MEDICINE & REHABILITATION
Payer: COMMERCIAL

## 2020-01-17 PROCEDURE — 97140 MANUAL THERAPY 1/> REGIONS: CPT

## 2020-01-17 PROCEDURE — 97110 THERAPEUTIC EXERCISES: CPT

## 2020-01-17 NOTE — PROGRESS NOTES
Dx: Greater trochanteric bursitis of left hip (M70.62)             Insurance (Authorized # of Visits):  16 O  (exp 3/31/20)       Authorizing Physician: Dr. July Spencer  Next MD visit: none scheduled  Fall Risk: standard         Precautions: n/a Date: 1/6/20            TX#: 9 (1/8) Date: 1/10/20              TX: 10 (2/8) Date: 1/13/20  Tx#: 11 (2/8)   There ex Nustep UE/LE L5 x 6min  Gait training: heel to toe, glut activation on hip extension  Supine heel slides on slideboard 10x  Supine hip addu

## 2020-01-21 ENCOUNTER — APPOINTMENT (OUTPATIENT)
Dept: PHYSICAL THERAPY | Age: 60
End: 2020-01-21
Attending: PHYSICAL MEDICINE & REHABILITATION
Payer: COMMERCIAL

## 2020-01-28 ENCOUNTER — OFFICE VISIT (OUTPATIENT)
Dept: PHYSICAL THERAPY | Age: 60
End: 2020-01-28
Attending: PHYSICAL MEDICINE & REHABILITATION
Payer: COMMERCIAL

## 2020-01-28 PROCEDURE — 97110 THERAPEUTIC EXERCISES: CPT | Performed by: PHYSICAL THERAPIST

## 2020-01-28 PROCEDURE — 97140 MANUAL THERAPY 1/> REGIONS: CPT | Performed by: PHYSICAL THERAPIST

## 2020-01-28 NOTE — PROGRESS NOTES
Dx: Greater trochanteric bursitis of left hip (M70.62)             Insurance (Authorized # of Visits):  16 O  (exp 3/31/20)       Authorizing Physician: Dr. Farhan Mcelroy  Next MD visit: none scheduled  Fall Risk: standard         Precautions: n/a             Brooks pt was unable to get into position      Manual Supine over small bolster: STM L quad/iliopsoas  Supine over small bolster: FR roll out at L quad R sidelying hip flexor stretch 10sec x2  Supine over small bolster: FR roll out at L quad      Modalities

## 2020-01-30 ENCOUNTER — OFFICE VISIT (OUTPATIENT)
Dept: PHYSICAL THERAPY | Age: 60
End: 2020-01-30
Attending: FAMILY MEDICINE
Payer: COMMERCIAL

## 2020-01-30 DIAGNOSIS — M70.62 GREATER TROCHANTERIC BURSITIS OF LEFT HIP: ICD-10-CM

## 2020-01-30 PROCEDURE — 97110 THERAPEUTIC EXERCISES: CPT

## 2020-01-30 PROCEDURE — 97140 MANUAL THERAPY 1/> REGIONS: CPT

## 2020-01-30 NOTE — PROGRESS NOTES
Dx: Greater trochanteric bursitis of left hip (M70.62)             Insurance (Authorized # of Visits):  16 O  (exp 3/31/20)       Authorizing Physician: Dr. Kacy Lara  Next MD visit: none scheduled  Fall Risk: standard         Precautions: n/a             Brooks UEs/LEs lvl [id number]  Supine heel slides on slideboard with focus on hip extension 2x10 L  Supine hip adductor stretch in BKFO position 2x10 L  Supine hip add with ball [id number]  Supine bridging 10x  R sidely clamshells L  R sidely hip abd with PT assist to

## 2020-01-31 ENCOUNTER — APPOINTMENT (OUTPATIENT)
Dept: PHYSICAL THERAPY | Age: 60
End: 2020-01-31
Attending: PHYSICAL MEDICINE & REHABILITATION
Payer: COMMERCIAL

## 2020-02-04 ENCOUNTER — OFFICE VISIT (OUTPATIENT)
Dept: PHYSICAL THERAPY | Age: 60
End: 2020-02-04
Attending: FAMILY MEDICINE
Payer: COMMERCIAL

## 2020-02-04 PROCEDURE — 97110 THERAPEUTIC EXERCISES: CPT | Performed by: PHYSICAL THERAPIST

## 2020-02-04 PROCEDURE — 97140 MANUAL THERAPY 1/> REGIONS: CPT | Performed by: PHYSICAL THERAPIST

## 2020-02-04 NOTE — PROGRESS NOTES
Dx: Greater trochanteric bursitis of left hip (M70.62)             Insurance (Authorized # of Visits):  16 O  (exp 3/31/20)       Authorizing Physician: Dr. Tom White  Next MD visit: none scheduled  Fall Risk: standard         Precautions: n/a             Brooks extension 2x10 L  Supine hip adductor stretch in BKFO position 2x10 L  Supine hip add with ball [id number]  Supine bridging 10x  R sidely clamshells L  R sidely hip abd with PT assist to maintain neutral pelvis 10x L NuStep UEs/LEs lvl [id number]  LTR x20   Supi

## 2020-02-12 ENCOUNTER — OFFICE VISIT (OUTPATIENT)
Dept: PHYSICAL THERAPY | Age: 60
End: 2020-02-12
Attending: PHYSICAL MEDICINE & REHABILITATION
Payer: COMMERCIAL

## 2020-02-12 PROCEDURE — 97140 MANUAL THERAPY 1/> REGIONS: CPT | Performed by: PHYSICAL THERAPIST

## 2020-02-12 PROCEDURE — 97110 THERAPEUTIC EXERCISES: CPT | Performed by: PHYSICAL THERAPIST

## 2020-02-12 NOTE — PROGRESS NOTES
Francisco  Pt has attended 16 visits in Physical Therapy.      Dx: Greater trochanteric bursitis of left hip (M70.62)             Insurance (Authorized # of Visits):  16 O  (exp 3/31/20)       Authorizing Physician: Dr. Jenni Grullon  Next MD visit: none will improve L LE strength to 4+/5 to increase ease with standing and walking -ongoing  5. Pt will be able to squat to  light objects around the house with <1/10 hip pain -ongoing  6.  Pt to report ability to ambulate without an assistive device for hip adductor stretch in BKFO position 2x10 L  Supine hip add with ball [id number]     Manual Supine over small bolster: STM L quad/iliopsoas  Supine over small bolster: FR roll out at L quad R sidelying hip flexor stretch 10sec x2  Supine over small bolster:

## 2020-02-17 ENCOUNTER — OFFICE VISIT (OUTPATIENT)
Dept: FAMILY MEDICINE CLINIC | Facility: CLINIC | Age: 60
End: 2020-02-17

## 2020-02-17 VITALS
SYSTOLIC BLOOD PRESSURE: 135 MMHG | WEIGHT: 265 LBS | HEART RATE: 105 BPM | DIASTOLIC BLOOD PRESSURE: 81 MMHG | BODY MASS INDEX: 44 KG/M2 | TEMPERATURE: 98 F | RESPIRATION RATE: 18 BRPM

## 2020-02-17 DIAGNOSIS — R20.0 NUMBNESS AND TINGLING IN RIGHT HAND: Primary | ICD-10-CM

## 2020-02-17 DIAGNOSIS — R06.83 SNORING: ICD-10-CM

## 2020-02-17 DIAGNOSIS — R20.2 NUMBNESS AND TINGLING IN RIGHT HAND: Primary | ICD-10-CM

## 2020-02-17 DIAGNOSIS — R05.9 COUGH: ICD-10-CM

## 2020-02-17 PROCEDURE — 99214 OFFICE O/P EST MOD 30 MIN: CPT | Performed by: FAMILY MEDICINE

## 2020-02-17 NOTE — PROGRESS NOTES
HPI: Philipp Danielle is a 60year old female who presents for multiple concerns. Had tingling in the right fingertips of right hand. Happens infrequently. Gets it mostly when she is laying on right side. It can happen when she is sitting up too.      Feels lik no murmurs  Lungs:  Clear to ausculation; good aeration               No wheezes, rales or rhonchi      Assessment:/Plan:  Numbness and tingling in right hand    Likely secondary to nerve compression. Advised ice and anti-inflammatories.      Cough    Likel

## 2020-03-12 ENCOUNTER — OFFICE VISIT (OUTPATIENT)
Dept: NEUROLOGY | Facility: CLINIC | Age: 60
End: 2020-03-12

## 2020-03-12 VITALS
HEART RATE: 80 BPM | DIASTOLIC BLOOD PRESSURE: 88 MMHG | RESPIRATION RATE: 18 BRPM | HEIGHT: 65 IN | WEIGHT: 259 LBS | SYSTOLIC BLOOD PRESSURE: 150 MMHG | BODY MASS INDEX: 43.15 KG/M2

## 2020-03-12 DIAGNOSIS — G47.9 SLEEP DISTURBANCE: ICD-10-CM

## 2020-03-12 DIAGNOSIS — S76.212D STRAIN OF ADDUCTOR MAGNUS MUSCLE OF LEFT LOWER EXTREMITY, SUBSEQUENT ENCOUNTER: ICD-10-CM

## 2020-03-12 DIAGNOSIS — M70.62 GREATER TROCHANTERIC BURSITIS OF LEFT HIP: Primary | ICD-10-CM

## 2020-03-12 DIAGNOSIS — E66.01 MORBID OBESITY WITH BMI OF 40.0-44.9, ADULT (HCC): ICD-10-CM

## 2020-03-12 DIAGNOSIS — M54.16 ACUTE LEFT LUMBAR RADICULOPATHY: ICD-10-CM

## 2020-03-12 PROCEDURE — 99213 OFFICE O/P EST LOW 20 MIN: CPT | Performed by: PHYSICAL MEDICINE & REHABILITATION

## 2020-03-12 NOTE — PROGRESS NOTES
[address] [facility]  NEW PATIENT EVALUATION    Chief Complaint: left back pain. HISTORY OF PRESENT ILLNESS:   Patient presents with:  Hip Pain: LOV 12/12/19 f/u for left hip pain radiating down thigh. Doing HEP.  Eric Johnson of the left hip which is notable for a mild gluten max and abductor strain but otherwise negative for any acute pathology. She continues to experience pain along the lateral hip as well as anterior thigh with radiation to the medial knee.   She denies any which was great for 1 day however the following day she started having pain again. Pain is located in the anterior thigh with radiation to the medial knee but denies any radiation distal to the knee into the lower extremity.   She endorses pain is worse wh Imiquimod (ALDARA) 5 % External Cream Apply to affected areas every other day 24 packet 1         ALLERGIES:   No Known Allergies      FAMILY HISTORY:     Family History   Problem Relation Age of Onset   • Hypertension Father    • Cancer Father         abby flexion      120  IR       30   ER       50    Single Leg Trendelenburg             Positive Positive  Anterior impingement   Negative  Negative    LATIA    Negative  Negative     LINO      Negative  Positive  Luis Eduardo    Not tested Not tested  Hamstring Tig group origin. 2. Stable mild pubic symphysis osteoarthritis. 3. Otherwise unremarkable MRI of the left hip. X-Ray LUMBAR SPINE dated October 9, 2019 revealed:   =====  CONCLUSION: Disc disease, alignment changes, and osteoarthritis at L5-S1.

## 2020-03-16 ENCOUNTER — TELEPHONE (OUTPATIENT)
Dept: FAMILY MEDICINE CLINIC | Facility: CLINIC | Age: 60
End: 2020-03-16

## 2020-03-16 DIAGNOSIS — Z12.39 SCREENING FOR BREAST CANCER: Primary | ICD-10-CM

## 2020-03-16 DIAGNOSIS — R92.8 ABNORMAL MAMMOGRAM OF LEFT BREAST: ICD-10-CM

## 2020-03-16 NOTE — TELEPHONE ENCOUNTER
Spoke with patient regarding results. They recommend a follow-up diagnostic mammogram with ultrasound in 6 months. This will put her at about the time that she is due for her bilateral yearly mammogram in September 2020. All orders placed.   Please fax t

## 2020-03-18 ENCOUNTER — TELEPHONE (OUTPATIENT)
Dept: NEUROLOGY | Facility: CLINIC | Age: 60
End: 2020-03-18

## 2020-07-23 ENCOUNTER — TELEMEDICINE (OUTPATIENT)
Dept: FAMILY MEDICINE CLINIC | Facility: CLINIC | Age: 60
End: 2020-07-23

## 2020-07-23 DIAGNOSIS — M25.552 LEFT HIP PAIN: ICD-10-CM

## 2020-07-23 DIAGNOSIS — M54.2 NECK PAIN: Primary | ICD-10-CM

## 2020-07-23 PROCEDURE — 99213 OFFICE O/P EST LOW 20 MIN: CPT | Performed by: FAMILY MEDICINE

## 2020-07-23 RX ORDER — CYCLOBENZAPRINE HCL 10 MG
10 TABLET ORAL NIGHTLY PRN
Qty: 30 TABLET | Refills: 1 | Status: SHIPPED | OUTPATIENT
Start: 2020-07-23 | End: 2020-08-12

## 2020-07-23 NOTE — PROGRESS NOTES
HPI: Jamel Arrieta is a 63year old female who presents for neck pain. Started about 2-3 weeks ago. On left side of neck and radiates to left shoulder. Tries to massage it out which helps. Has left hip pain as well. Hurts to change positions.          PMH visitation. There are limitations of this visit as no physical exam could be performed. Every conscious effort was taken to allow for sufficient and adequate time.   This billing was spent on reviewing labs, medications, radiology tests and decision makin

## 2020-09-24 DIAGNOSIS — R92.8 ABNORMAL MAMMOGRAM OF LEFT BREAST: ICD-10-CM

## 2020-09-24 DIAGNOSIS — Z12.31 ENCOUNTER FOR SCREENING MAMMOGRAM FOR BREAST CANCER: Primary | ICD-10-CM

## 2020-10-14 ENCOUNTER — OFFICE VISIT (OUTPATIENT)
Dept: FAMILY MEDICINE CLINIC | Facility: CLINIC | Age: 60
End: 2020-10-14

## 2020-10-14 ENCOUNTER — HOSPITAL ENCOUNTER (OUTPATIENT)
Dept: GENERAL RADIOLOGY | Age: 60
Discharge: HOME | End: 2020-10-14
Attending: FAMILY MEDICINE
Payer: COMMERCIAL

## 2020-10-14 VITALS
DIASTOLIC BLOOD PRESSURE: 79 MMHG | BODY MASS INDEX: 42 KG/M2 | SYSTOLIC BLOOD PRESSURE: 126 MMHG | HEART RATE: 80 BPM | WEIGHT: 252 LBS | RESPIRATION RATE: 18 BRPM | TEMPERATURE: 97 F

## 2020-10-14 DIAGNOSIS — M25.571 ACUTE RIGHT ANKLE PAIN: ICD-10-CM

## 2020-10-14 DIAGNOSIS — M79.605 LEFT LEG PAIN: ICD-10-CM

## 2020-10-14 DIAGNOSIS — M25.571 ACUTE RIGHT ANKLE PAIN: Primary | ICD-10-CM

## 2020-10-14 PROCEDURE — 3078F DIAST BP <80 MM HG: CPT | Performed by: FAMILY MEDICINE

## 2020-10-14 PROCEDURE — 99214 OFFICE O/P EST MOD 30 MIN: CPT | Performed by: FAMILY MEDICINE

## 2020-10-14 PROCEDURE — 3074F SYST BP LT 130 MM HG: CPT | Performed by: FAMILY MEDICINE

## 2020-10-14 PROCEDURE — 73610 X-RAY EXAM OF ANKLE: CPT | Performed by: FAMILY MEDICINE

## 2020-10-14 RX ORDER — IBUPROFEN 200 MG
200 TABLET ORAL EVERY 6 HOURS PRN
COMMUNITY

## 2020-10-14 RX ORDER — ACETAMINOPHEN 500 MG
500 TABLET ORAL EVERY 6 HOURS PRN
COMMUNITY

## 2020-10-14 NOTE — PROGRESS NOTES
HPI: Leah Jones is a 63year old female who presents for joint pain. Neck pain seem to have subsided. Lost weight! Pt reports that she is having more pain in right ankle. Ankle seems to be going out on her. Pain radiates up her leg.   Has pins in her ankle •  Multiple Vitamin (MULTIVITAMINS) Oral Cap, Take  by mouth., Disp: , Rfl:     No current facility-administered medications on file prior to visit. Tobacco Use: no     ROS: see HPI    Objective:   Gen: AOx3. NAD.    /79   Pulse 80   Temp 97

## 2020-11-12 ENCOUNTER — OFFICE VISIT (OUTPATIENT)
Dept: FAMILY MEDICINE CLINIC | Facility: CLINIC | Age: 60
End: 2020-11-12

## 2020-11-12 VITALS
BODY MASS INDEX: 41.99 KG/M2 | DIASTOLIC BLOOD PRESSURE: 73 MMHG | HEIGHT: 65 IN | WEIGHT: 252 LBS | RESPIRATION RATE: 16 BRPM | HEART RATE: 72 BPM | SYSTOLIC BLOOD PRESSURE: 114 MMHG | TEMPERATURE: 97 F

## 2020-11-12 DIAGNOSIS — R06.83 SNORING: ICD-10-CM

## 2020-11-12 DIAGNOSIS — Z00.00 ROUTINE PHYSICAL EXAMINATION: Primary | ICD-10-CM

## 2020-11-12 PROCEDURE — 3008F BODY MASS INDEX DOCD: CPT | Performed by: FAMILY MEDICINE

## 2020-11-12 PROCEDURE — 99396 PREV VISIT EST AGE 40-64: CPT | Performed by: FAMILY MEDICINE

## 2020-11-12 PROCEDURE — 3078F DIAST BP <80 MM HG: CPT | Performed by: FAMILY MEDICINE

## 2020-11-12 PROCEDURE — 90471 IMMUNIZATION ADMIN: CPT | Performed by: FAMILY MEDICINE

## 2020-11-12 PROCEDURE — 3074F SYST BP LT 130 MM HG: CPT | Performed by: FAMILY MEDICINE

## 2020-11-12 PROCEDURE — 90750 HZV VACC RECOMBINANT IM: CPT | Performed by: FAMILY MEDICINE

## 2020-11-12 NOTE — PROGRESS NOTES
HPI:  63 yr old female who presents for physical. Works at home occasionally for [employer]. Not getting much exercise. She would like to lose weight. States she try to eats healthy and low calorie. Does not drinks soda. .    had str Well-nourished. No distress. HEENT: PERRLA, conjunctive clear. Yohannes ear canals clear. Yohannes TMs intact with good landmarks noted. Nares patent. Oropharynx normal.  Neck supple. Good ROM. No LAD.   Thyroid normal.  CV:  Regular rate and rhythm; no murm

## 2021-01-29 ENCOUNTER — NURSE ONLY (OUTPATIENT)
Dept: FAMILY MEDICINE CLINIC | Facility: CLINIC | Age: 61
End: 2021-01-29

## 2021-01-29 DIAGNOSIS — Z23 NEED FOR SHINGLES VACCINE: Primary | ICD-10-CM

## 2021-01-29 PROCEDURE — 90471 IMMUNIZATION ADMIN: CPT | Performed by: FAMILY MEDICINE

## 2021-01-29 PROCEDURE — 90750 HZV VACC RECOMBINANT IM: CPT | Performed by: FAMILY MEDICINE

## 2021-02-15 ENCOUNTER — PATIENT MESSAGE (OUTPATIENT)
Dept: FAMILY MEDICINE CLINIC | Facility: CLINIC | Age: 61
End: 2021-02-15

## 2021-02-16 NOTE — TELEPHONE ENCOUNTER
From: Evelio Irvin  To: Everett Flores DO  Sent: 2/15/2021 10:08 PM CST  Subject: Other    I want to schedule my COVID shot.

## 2021-04-20 ENCOUNTER — PATIENT MESSAGE (OUTPATIENT)
Dept: FAMILY MEDICINE CLINIC | Facility: CLINIC | Age: 61
End: 2021-04-20

## 2021-04-20 NOTE — TELEPHONE ENCOUNTER
From: Ulises How  To: Thom May DO  Sent: 4/20/2021 1:22 PM CDT  Subject: Referral Request    I need a referral from my doctor for a mammogram that I'm having on May 6 at the [facility] in [facility] [place] ([facility]).     Please fax it to the

## 2021-06-17 ENCOUNTER — OFFICE VISIT (OUTPATIENT)
Dept: FAMILY MEDICINE CLINIC | Facility: CLINIC | Age: 61
End: 2021-06-17

## 2021-06-17 ENCOUNTER — LAB ENCOUNTER (OUTPATIENT)
Dept: LAB | Age: 61
End: 2021-06-17
Attending: FAMILY MEDICINE
Payer: COMMERCIAL

## 2021-06-17 VITALS
HEART RATE: 75 BPM | RESPIRATION RATE: 18 BRPM | WEIGHT: 259 LBS | SYSTOLIC BLOOD PRESSURE: 133 MMHG | BODY MASS INDEX: 43 KG/M2 | TEMPERATURE: 98 F | DIASTOLIC BLOOD PRESSURE: 65 MMHG

## 2021-06-17 DIAGNOSIS — M79.671 RIGHT FOOT PAIN: ICD-10-CM

## 2021-06-17 DIAGNOSIS — M79.671 RIGHT FOOT PAIN: Primary | ICD-10-CM

## 2021-06-17 PROCEDURE — 3078F DIAST BP <80 MM HG: CPT | Performed by: FAMILY MEDICINE

## 2021-06-17 PROCEDURE — 3075F SYST BP GE 130 - 139MM HG: CPT | Performed by: FAMILY MEDICINE

## 2021-06-17 PROCEDURE — 84550 ASSAY OF BLOOD/URIC ACID: CPT

## 2021-06-17 PROCEDURE — 36415 COLL VENOUS BLD VENIPUNCTURE: CPT

## 2021-06-17 PROCEDURE — 99213 OFFICE O/P EST LOW 20 MIN: CPT | Performed by: FAMILY MEDICINE

## 2021-06-17 RX ORDER — NAPROXEN 500 MG/1
500 TABLET ORAL 2 TIMES DAILY WITH MEALS
Qty: 30 TABLET | Refills: 0 | Status: SHIPPED | OUTPATIENT
Start: 2021-06-17 | End: 2021-07-11

## 2021-06-17 NOTE — PROGRESS NOTES
HPI: Luz Marina Chicas is a 57year old female who presents for right foot pain. Had surgery in 2019 on right ankle. Has had swelling and pain to the top of her foot for about 6 weeks. Pain keeps her up at night.  Taking Tylenol and Ibuprofen regularly without relief Janeal Pleva, DO

## 2021-06-21 ENCOUNTER — HOSPITAL ENCOUNTER (OUTPATIENT)
Dept: GENERAL RADIOLOGY | Age: 61
Discharge: HOME | End: 2021-06-21
Attending: FAMILY MEDICINE
Payer: COMMERCIAL

## 2021-06-21 DIAGNOSIS — M79.671 RIGHT FOOT PAIN: ICD-10-CM

## 2021-06-21 PROCEDURE — 73630 X-RAY EXAM OF FOOT: CPT | Performed by: FAMILY MEDICINE

## 2021-07-01 ENCOUNTER — OFFICE VISIT (OUTPATIENT)
Dept: PODIATRY CLINIC | Facility: CLINIC | Age: 61
End: 2021-07-01

## 2021-07-01 DIAGNOSIS — M79.671 RIGHT FOOT PAIN: Primary | ICD-10-CM

## 2021-07-01 DIAGNOSIS — M77.9 TENDONITIS: ICD-10-CM

## 2021-07-01 PROCEDURE — 99213 OFFICE O/P EST LOW 20 MIN: CPT | Performed by: PODIATRIST

## 2021-07-02 NOTE — PROGRESS NOTES
HPI:    Patient ID: Merry Coley is a 57year old female. This 69-year-old female presents to the office today having not been seen in almost 3 years. Her chief complaint today is pain associated with the right foot.   Is been present for the last few m mild edema in both feet and lower legs there is no erythema. There is no evidence of neurologic deficit no weakness is noted. The pain is with weightbearing only. There is no pain at rest.  There is no calf tenderness on direct palpation.   The discomfor

## 2021-07-11 RX ORDER — NAPROXEN 500 MG/1
TABLET ORAL
Qty: 30 TABLET | Refills: 0 | Status: SHIPPED | OUTPATIENT
Start: 2021-07-11

## 2021-07-22 ENCOUNTER — OFFICE VISIT (OUTPATIENT)
Dept: PODIATRY CLINIC | Facility: CLINIC | Age: 61
End: 2021-07-22

## 2021-07-22 DIAGNOSIS — M77.9 TENDONITIS: ICD-10-CM

## 2021-07-22 DIAGNOSIS — M76.821 TIBIALIS POSTERIOR TENDINITIS, RIGHT: Primary | ICD-10-CM

## 2021-07-22 PROCEDURE — 99213 OFFICE O/P EST LOW 20 MIN: CPT | Performed by: PODIATRIST

## 2021-07-22 NOTE — PROGRESS NOTES
HPI:    Patient ID: Evelio Irvin is a 57year old female. This 57-year-old female presents for follow-up in reference to medial foot and ankle pain on the right.   Treatment rendered over the last couple of weeks is provided her with some degree of improve after she finishes physical therapy. I told her to continue the naproxen for another 2-1/2 to 3 weeks. I instructed her to discontinue the medication at least a week prior to our visit.   She is well-informed and I would expect to see her in a month

## 2021-09-14 ENCOUNTER — TELEPHONE (OUTPATIENT)
Dept: PHYSICAL THERAPY | Facility: HOSPITAL | Age: 61
End: 2021-09-14

## 2021-09-22 ENCOUNTER — OFFICE VISIT (OUTPATIENT)
Dept: PHYSICAL THERAPY | Facility: HOSPITAL | Age: 61
End: 2021-09-22
Attending: PODIATRIST
Payer: COMMERCIAL

## 2021-09-22 DIAGNOSIS — M76.821 TIBIALIS POSTERIOR TENDINITIS, RIGHT: ICD-10-CM

## 2021-09-22 PROCEDURE — 97110 THERAPEUTIC EXERCISES: CPT

## 2021-09-22 PROCEDURE — 97162 PT EVAL MOD COMPLEX 30 MIN: CPT

## 2021-09-22 NOTE — PROGRESS NOTES
LOWER EXTREMITY EVALUATION:   Referring Physician: Dr. Jerry Welch  Diagnosis: Tibialis posterior tendinitis, right (T01.719)      Date of Onset: 2-3/months   Insurance: [facility] 74 8 visits authorized  Initial FOTO: 41/100   Projected FOTO: 61/100  FOTO at 5th Lipoma    • Pneumonia due to organism 2018   • Screen for colon cancer 2019    repeat CLN in 5 yrs due to hx of polyps      Past Surgical History:   Procedure Laterality Date   • ANKLE FRACTURE SURGERY Right    • ARTHROPLASTY PATELLA Left    • COLONOSCOPY 105  Extension: R -10; L -10  Abduction: R WNL; L WNL  ER: R 25; L 44  IR: R 40; L 31 Flexion: R 110; L 114  Extension: R 0; L -2    DF: R -3; L -5  PF: R 60; L 60  INV: R 10; L 17  EV: R 20; L 20           Accessory motion: decreased IV rearfoot right and education; Home exercise program instruction; joint mobilization    Education or treatment limitation: None,   Rehab Potential: good        Patient/Family/Caregiver was advised of these findings, precautions, and treatment options and has agreed to [employer]

## 2021-09-28 ENCOUNTER — OFFICE VISIT (OUTPATIENT)
Dept: PHYSICAL THERAPY | Facility: HOSPITAL | Age: 61
End: 2021-09-28
Attending: PODIATRIST
Payer: COMMERCIAL

## 2021-09-28 DIAGNOSIS — M76.821 TIBIALIS POSTERIOR TENDINITIS, RIGHT: ICD-10-CM

## 2021-09-28 PROCEDURE — 97140 MANUAL THERAPY 1/> REGIONS: CPT

## 2021-09-28 PROCEDURE — 97110 THERAPEUTIC EXERCISES: CPT

## 2021-09-28 NOTE — PROGRESS NOTES
Referring Physician: Dr. Linda Garcia  Diagnosis: Tibialis posterior tendinitis, right (J62.655)      Date of Onset: 2-3/months   Insurance: [place] [phone] 8 visits authorized  Initial FOTO: 41/100   Projected FOTO: 61/100  FOTO at 5th visit: **/100  FOTO at DC:  ** right instruction AROM 5 x -10x each                       HEP:   Initial: towel for ankle DF and PF stretches  9/28/21: ankle alphabet, ankle IV/EV AROM,     Charges:  Man x 1, Therap ex x 2       Total Timed Treatment: 45 min  Total Treatment Time: 45 min

## 2021-09-30 ENCOUNTER — OFFICE VISIT (OUTPATIENT)
Dept: PHYSICAL THERAPY | Facility: HOSPITAL | Age: 61
End: 2021-09-30
Attending: PODIATRIST
Payer: COMMERCIAL

## 2021-09-30 DIAGNOSIS — M76.821 TIBIALIS POSTERIOR TENDINITIS, RIGHT: ICD-10-CM

## 2021-09-30 PROCEDURE — 97110 THERAPEUTIC EXERCISES: CPT

## 2021-09-30 PROCEDURE — 97140 MANUAL THERAPY 1/> REGIONS: CPT

## 2021-09-30 NOTE — PROGRESS NOTES
Referring Physician: Dr. Lois Ellis  Diagnosis: Tibialis posterior tendinitis, right (Y24.548)      Date of Onset: 2-3/months   Insurance: [place] [phone] visits authorized  Initial FOTO: 41/100   Projected FOTO: 61/100  FOTO at 5th visit: **/100  FOTO at DC:  ** 2/8 initial Date: 9/30/21             TX#: 3/8 Date:                 TX#: 4/ Date:                 TX#: 5/ Date:    Tx#: 6/   Manual Therapy:  -STM to right gastroc and soleus supine  -Calcaneal distractions right mobs grade 2-3  -Calcaneal EV/IV right   -a

## 2021-10-05 ENCOUNTER — OFFICE VISIT (OUTPATIENT)
Dept: PHYSICAL THERAPY | Facility: HOSPITAL | Age: 61
End: 2021-10-05
Attending: PODIATRIST
Payer: COMMERCIAL

## 2021-10-05 DIAGNOSIS — M76.821 TIBIALIS POSTERIOR TENDINITIS, RIGHT: ICD-10-CM

## 2021-10-05 PROCEDURE — 97140 MANUAL THERAPY 1/> REGIONS: CPT

## 2021-10-05 PROCEDURE — 97110 THERAPEUTIC EXERCISES: CPT

## 2021-10-05 NOTE — PROGRESS NOTES
Referring Physician: Dr. Paula Branch  Diagnosis: Tibialis posterior tendinitis, right (X80.188)      Date of Onset: 2-3/months   Insurance: [facility] 74 8 visits authorized  Initial FOTO: 41/100   Projected FOTO: 61/100  FOTO at 5th visit: **/100  FOTO at DC:  ** TX#: 4/8 Date:                 TX#: 5/ Date:    Tx#: 6/   Manual Therapy:  -STM to right gastroc and soleus supine  -Calcaneal distractions right mobs grade 2-3  -Calcaneal EV/IV right   -attempt at posterior talar glide right grade 2-3 with soreness repo Timed Treatment: 45 min  Total Treatment Time: 45 min

## 2021-10-07 ENCOUNTER — OFFICE VISIT (OUTPATIENT)
Dept: PHYSICAL THERAPY | Facility: HOSPITAL | Age: 61
End: 2021-10-07
Attending: PODIATRIST
Payer: COMMERCIAL

## 2021-10-07 DIAGNOSIS — M76.821 TIBIALIS POSTERIOR TENDINITIS, RIGHT: ICD-10-CM

## 2021-10-07 PROCEDURE — 97140 MANUAL THERAPY 1/> REGIONS: CPT

## 2021-10-07 PROCEDURE — 97110 THERAPEUTIC EXERCISES: CPT

## 2021-10-08 NOTE — PROGRESS NOTES
Referring Physician: Dr. Cornelio Peraza  Diagnosis: Tibialis posterior tendinitis, right (H96.077)      Date of Onset: 2-3/months   Insurance: [facility] 74 8 visits authorized  Initial FOTO: 41/100   Projected FOTO: 61/100  FOTO at 5th visit: **/100  FOTO at DC:  ** Date:   10/5/21              TX#: 4/8 Date:   10/7/21              TX#: 5/8 Date:    Tx#: 6/   Manual Therapy:  -STM to right gastroc and soleus supine  -Calcaneal distractions right mobs grade 2-3  -Calcaneal EV/IV right   -attempt at posterior talar glide right with assist of right UE on railing for stabilty Therap ex;  -BAPS board level 3 ankle DF PF 10x  -BAPS board level 3 in II bars for attempt at circles CW and CCW  -PROM ankle DF and PF and IV/EV right  10x   -Passive gastroc stretches for supine ankl

## 2021-10-12 ENCOUNTER — OFFICE VISIT (OUTPATIENT)
Dept: PHYSICAL THERAPY | Facility: HOSPITAL | Age: 61
End: 2021-10-12
Attending: PODIATRIST
Payer: COMMERCIAL

## 2021-10-12 ENCOUNTER — TELEPHONE (OUTPATIENT)
Dept: PHYSICAL THERAPY | Facility: HOSPITAL | Age: 61
End: 2021-10-12

## 2021-10-12 DIAGNOSIS — M76.821 TIBIALIS POSTERIOR TENDINITIS, RIGHT: ICD-10-CM

## 2021-10-12 PROCEDURE — 97110 THERAPEUTIC EXERCISES: CPT

## 2021-10-12 PROCEDURE — 97140 MANUAL THERAPY 1/> REGIONS: CPT

## 2021-10-12 NOTE — PROGRESS NOTES
PROGRESS NOTE  Referring Physician: Dr. Alexia Feng  Diagnosis: Tibialis posterior tendinitis, right (A69.388)      Date of Onset: 2-3/months   Insurance: [facility] 74 8 visits authorized  Initial FOTO: 41/100   Projected FOTO: 61/100  FOTO at 5th visit: **/100 right posterior,tibialis tendon area, and inner arch as well as anterior talas.    Strength/MMT: initial:   Hip Knee Foot/Ankle   Flexion: R 5/5; L 5/5  Extension: R NT/5; L NT/5  Abduction: R 4-/5; L 4-/5  ER: R 4+/5; L 4+/5  IR: R N/5; L NT/5 Flexion: R 5 5/8 Date: 10/12/21  Tx#: 6/8    Manual Therapy:  -STM to right gastroc and soleus supine  -Calcaneal distractions right mobs grade 2-3  -Calcaneal EV/IV right   -attempt at posterior talar glide right grade 2-3 with soreness reported  -Sup/inf glides metat ankle DF  4 x 15 secs supine  -Isometric contractions DF EV, IV right ankle  -H/L clams BTB 2 x 10  -provision of stepping drill for home with practice of heel strike foot flat and toe off on right with assist of right UE on railing for stabilty Therap ex;

## 2021-10-13 ENCOUNTER — TELEPHONE (OUTPATIENT)
Dept: PODIATRY CLINIC | Facility: CLINIC | Age: 61
End: 2021-10-13

## 2021-10-13 DIAGNOSIS — M76.821 TIBIALIS POSTERIOR TENDINITIS, RIGHT: Primary | ICD-10-CM

## 2021-10-13 NOTE — TELEPHONE ENCOUNTER
[facility] requesting a new referral for 6 additional visits.  Please advise

## 2021-10-14 ENCOUNTER — OFFICE VISIT (OUTPATIENT)
Dept: PHYSICAL THERAPY | Facility: HOSPITAL | Age: 61
End: 2021-10-14
Attending: PODIATRIST
Payer: COMMERCIAL

## 2021-10-14 DIAGNOSIS — M76.821 TIBIALIS POSTERIOR TENDINITIS, RIGHT: ICD-10-CM

## 2021-10-14 PROCEDURE — 97110 THERAPEUTIC EXERCISES: CPT

## 2021-10-14 PROCEDURE — 97140 MANUAL THERAPY 1/> REGIONS: CPT

## 2021-10-14 NOTE — PROGRESS NOTES
Referring Physician: Dr. Kj Ruby  Diagnosis: Tibialis posterior tendinitis, right (G32.708)      Date of Onset: 2-3/months   Insurance: [place] [phone] visits authorized  Initial FOTO: 41/100   Projected FOTO: 61/100  FOTO at 5th visit: **/100  FOTO at DC: to  60/100 or greater.   · Patient will demonstrate independent HEP with good tolerance  PROGRESSING  · Increase standing and walking tolerance to 20min or greater  · Patient will demonstrate right Ankle DF AROM to 10 degrees to allow for reduction in strai 2/3  -calcaneal distractions grade 3 on right  -Sup/inf glides metatarsel I-V right grade 2-3  -STM to soleus right and gastroc right   -STM to anterior tibialis right and achilles tendon light friction massage        Manual therapy:  Calcaneal right distr ankle DF 4 x 15 secs  -Passive soleus stretches for ankle DF 4 x 15 secs Therap ex:  PROM : right ankle DF, PF,   AROM: right ankle DF, PF, IV, EV   -Standing semi tandem in II bars for right and left behind  -SLS right and left   -MMT RLE,   -reassessment

## 2021-10-19 ENCOUNTER — TELEPHONE (OUTPATIENT)
Dept: PODIATRY CLINIC | Facility: CLINIC | Age: 61
End: 2021-10-19

## 2021-10-19 ENCOUNTER — OFFICE VISIT (OUTPATIENT)
Dept: PHYSICAL THERAPY | Facility: HOSPITAL | Age: 61
End: 2021-10-19
Attending: PODIATRIST
Payer: COMMERCIAL

## 2021-10-19 DIAGNOSIS — M76.821 TIBIALIS POSTERIOR TENDINITIS, RIGHT: ICD-10-CM

## 2021-10-19 PROCEDURE — 97140 MANUAL THERAPY 1/> REGIONS: CPT

## 2021-10-19 PROCEDURE — 97110 THERAPEUTIC EXERCISES: CPT

## 2021-10-19 NOTE — PROGRESS NOTES
Referring Physician: Dr. Halina Lundborg  Diagnosis: Tibialis posterior tendinitis, right (K67.424)      Date of Onset: 2-3/months   Insurance: [place] [phone] 8 visits authorized  Initial FOTO: 41/100   Projected FOTO: 61/100  FOTO at 5th visit: **/100  FOTO at DC: show improved FOTO score to  60/100 or greater.   · Patient will demonstrate independent HEP with good tolerance  PROGRESSING  · Increase standing and walking tolerance to 20min or greater  · Patient will demonstrate right Ankle DF AROM to 10 degrees to all therapy:  Calcaneal right distractions grade 3 mobs supine  -posterior talar glides supine grade 3 mobs and grade 2 right   -standing with right ankle on upper step and lunges slowly with talar posterior glide right ankle 10-15 reps  -Mid tarsel joint mobi secs  -Passive soleus stretches for ankle DF 4 x 15 secs Therap ex:  PROM : right ankle DF, PF,   AROM: right ankle DF, PF, IV, EV   -Standing semi tandem in II bars for right and left behind  -SLS right and left   -MMT RLE,   -reassessment   -Passive sole

## 2021-10-27 ENCOUNTER — PATIENT MESSAGE (OUTPATIENT)
Dept: FAMILY MEDICINE CLINIC | Facility: CLINIC | Age: 61
End: 2021-10-27

## 2021-10-27 NOTE — TELEPHONE ENCOUNTER
From: Eran Mora  To: Kelly Isbell DO  Sent: 10/27/2021 11:54 AM CDT  Subject: Flu shot    Can I schedule an appointment to have my flu shot?

## 2021-11-16 ENCOUNTER — IMMUNIZATION (OUTPATIENT)
Dept: FAMILY MEDICINE CLINIC | Facility: CLINIC | Age: 61
End: 2021-11-16

## 2021-11-16 DIAGNOSIS — Z23 NEED FOR VACCINATION: Primary | ICD-10-CM

## 2021-11-16 PROCEDURE — 90686 IIV4 VACC NO PRSV 0.5 ML IM: CPT | Performed by: FAMILY MEDICINE

## 2021-11-16 PROCEDURE — 90471 IMMUNIZATION ADMIN: CPT | Performed by: FAMILY MEDICINE

## 2021-11-17 ENCOUNTER — OFFICE VISIT (OUTPATIENT)
Dept: PODIATRY CLINIC | Facility: CLINIC | Age: 61
End: 2021-11-17

## 2021-11-17 DIAGNOSIS — M76.821 TIBIALIS POSTERIOR TENDINITIS, RIGHT: ICD-10-CM

## 2021-11-17 DIAGNOSIS — M76.821 POSTERIOR TIBIAL TENDINITIS OF RIGHT LOWER EXTREMITY: Primary | ICD-10-CM

## 2021-11-17 PROCEDURE — 99213 OFFICE O/P EST LOW 20 MIN: CPT | Performed by: PODIATRIST

## 2021-11-18 NOTE — PROGRESS NOTES
HPI:    Patient ID: Cinthia Chambers is a 57year old female. 40-year-old female presents to the office today having not been seen since July 22. Patient has undergone rather extensive physical therapy and states that she is about 75% improved.   She has the I encouraged shoes at all times no barefoot walking. She is wearing crocs today and I encouraged her and would prefer that she wore extra-depth good and supportive shoe.   I agreed to an additional four treatments of physical therapy and will reevaluate in

## 2021-11-23 ENCOUNTER — OFFICE VISIT (OUTPATIENT)
Dept: PHYSICAL THERAPY | Facility: HOSPITAL | Age: 61
End: 2021-11-23
Attending: PODIATRIST
Payer: COMMERCIAL

## 2021-11-23 PROCEDURE — 97140 MANUAL THERAPY 1/> REGIONS: CPT

## 2021-11-23 PROCEDURE — 97110 THERAPEUTIC EXERCISES: CPT

## 2021-11-23 NOTE — PROGRESS NOTES
Referring Physician: Dr. Candice Lopez  Diagnosis: Tibialis posterior tendinitis, right (L19.126)      Date of Onset: 2-3/months   Insurance: [facility] 74 8 visits authorized  Initial FOTO: 41/100   Projected FOTO: 61/100  FOTO at 5th visit: **/100  FOTO at DC: mobility. PROGRESSING  · Patient will show improved FOTO score to  60/100 or greater.   · Patient will demonstrate independent HEP with good tolerance  PROGRESSING  · Increase standing and walking tolerance to 20min or greater  · Patient will demonstrate ri glides supine grade 3 mobs and grade 2 right   -standing with right ankle on upper step and lunges slowly with talar posterior glide right ankle 10-15 reps  -Mid tarsel joint mobility  -STM to distal achilles right Man therapy:  Calcaneal right distraction of right UE on railing for stabilty Therap ex;  -BAPS board level 3 ankle DF PF 10x  -BAPS board level 3 in II bars for attempt at circles CW and CCW  -PROM ankle DF and PF and IV/EV right  10x   -Passive gastroc stretches for supine ankle DF 4 x 15 secs

## 2021-11-29 ENCOUNTER — APPOINTMENT (OUTPATIENT)
Dept: PHYSICAL THERAPY | Facility: HOSPITAL | Age: 61
End: 2021-11-29
Attending: PODIATRIST
Payer: COMMERCIAL

## 2021-12-02 ENCOUNTER — OFFICE VISIT (OUTPATIENT)
Dept: PHYSICAL THERAPY | Facility: HOSPITAL | Age: 61
End: 2021-12-02
Attending: PODIATRIST
Payer: COMMERCIAL

## 2021-12-02 PROCEDURE — 97110 THERAPEUTIC EXERCISES: CPT

## 2021-12-02 PROCEDURE — 97140 MANUAL THERAPY 1/> REGIONS: CPT

## 2021-12-02 NOTE — PROGRESS NOTES
Referring Physician: Dr. Crowell Apt  Diagnosis: Tibialis posterior tendinitis, right (Y36.016)      Date of Onset: 2-3/months   Insurance: [place] [phone] visits authorized  Initial FOTO: 41/100   Projected FOTO: 61/100  FOTO at 5th visit: **/100  FOTO at DC: PROGRESSING  · Patient will show improved FOTO score to  60/100 or greater.   · Patient will demonstrate independent HEP with good tolerance  PROGRESSING  · Increase standing and walking tolerance to 20min or greater  · Patient will demonstrate right Ankle talar glides supine grade 3 mobs and grade 2 right   -standing with right ankle on upper step and lunges slowly with talar posterior glide right ankle 10-15 reps  -Mid tarsel joint mobility  -STM to distal achilles right Man therapy:  Calcaneal right distr ankle right 5 x 10 secs Therap ex:   -gastroc stretch towel 5 x 15 secs  -Passive stretch right ankle DF  4 x 15 secs supine  -Isometric contractions DF EV, IV right ankle  -H/L clams BTB 2 x 10  -provision of stepping drill for home with practice of heel and 9pm and 12 pm and 6 pm taps 15 reps  -instruction in theraband isometric holds for right posterior tibialis tendon seated for home  -standing side step with RTB left and right 4 rounds in II bars                           HEP:   Initial: towel for richie

## 2021-12-06 ENCOUNTER — OFFICE VISIT (OUTPATIENT)
Dept: PHYSICAL THERAPY | Facility: HOSPITAL | Age: 61
End: 2021-12-06
Attending: PODIATRIST
Payer: COMMERCIAL

## 2021-12-06 ENCOUNTER — TELEPHONE (OUTPATIENT)
Dept: FAMILY MEDICINE CLINIC | Facility: CLINIC | Age: 61
End: 2021-12-06

## 2021-12-06 PROCEDURE — 97140 MANUAL THERAPY 1/> REGIONS: CPT

## 2021-12-06 PROCEDURE — 97110 THERAPEUTIC EXERCISES: CPT

## 2021-12-06 NOTE — PROGRESS NOTES
Referring Physician: Dr. Driscoll Him  Diagnosis: Tibialis posterior tendinitis, right (P99.069)      Date of Onset: 2-3/months   Insurance: [place] [phone] visits authorized  Initial FOTO: 41/100   Projected FOTO: 61/100  FOTO at 5th visit: **/100  FOTO at DC: L 60  INV: R 10; L 17  EV: R 20; L 20    DF: 5  PF: NT DF: 5:   PF: 58  IV:  20  EV: 20 DF 9 deg         goals to be achieved at 8-12 sessions    · Patient will demonstrate  reduction in pain at foot area at rest and with gait down 50% for max pain  PROGRE right 10x mobs grade 3  -attempt at posterior talar glide right with soreness grade 2/3  -calcaneal distractions grade 3 on right  -Sup/inf glides metatarsel I-V right grade 2-3  -STM to soleus right and gastroc right   -STM to anterior tibialis right and metarsal glide  -calcaneal right distractions grade 3 and 4 supine  -mid tarsel joint mobilization right foot   -joint mob posterior glide at right talus 10x  -callcaneal IV /EV mobs right ankle   Therap ex:   -Gastroc and soleus stretch with towel right 4 fwd on 8\" step right ankle DF  -heel raises in II bars  Bilateral  Therap ex:   -PROM right all motions DF, PF, IV  -Heel raises and toe raises in II bars  -Inclined board in II bars 3 x 20 secs  -standing in II bars BAPS cw and ccw right level 3 10 x eac

## 2021-12-06 NOTE — TELEPHONE ENCOUNTER
Mammogram report received from [facility]; placed on Dr Galo Peer desk for review.

## 2021-12-08 ENCOUNTER — OFFICE VISIT (OUTPATIENT)
Dept: PHYSICAL THERAPY | Facility: HOSPITAL | Age: 61
End: 2021-12-08
Attending: PODIATRIST
Payer: COMMERCIAL

## 2021-12-08 PROCEDURE — 97110 THERAPEUTIC EXERCISES: CPT

## 2021-12-08 PROCEDURE — 97140 MANUAL THERAPY 1/> REGIONS: CPT

## 2021-12-08 NOTE — PROGRESS NOTES
Referring Physician: Dr. Halina Lundborg  Diagnosis: Tibialis posterior tendinitis, right (A13.420)      Date of Onset: 2-3/months   Insurance: [place] [phone] visits authorized  Initial FOTO: 41/100   Projected FOTO: 61/100  FOTO at 5th visit: **/100  FOTO at DC: L 17  EV: R 20; L 20    DF: 5  PF: NT DF: 5:   PF: 58  IV:  20  EV: 20 DF 9 deg         goals to be achieved at 8-12 sessions    · Patient will demonstrate  reduction in pain at foot area at rest and with gait down 50% for max pain  PROGRESSING  · Patient distractions grade 3 and 4 supine  -mid tarsel joint mobilization right foot   -STM to achilles, posterior tibialis belly and tendon  -joint mob posterior glide at right talus 10x  -callcaneal IV /EV mobs right ankle Manual therapy  -metatarsal Sup/inf mob ankle Therap ex:   -Passive gastroc and soleus stretch 4 x 15 secs each   --heel raise sustained isometric  Holds in II bars 3 x 45 secs in up position 3 x 45 secs with 2 min then 3 min rest between, with support of II bars for balance and 2 mins+ recovery

## 2021-12-15 ENCOUNTER — OFFICE VISIT (OUTPATIENT)
Dept: PHYSICAL THERAPY | Facility: HOSPITAL | Age: 61
End: 2021-12-15
Attending: PODIATRIST
Payer: COMMERCIAL

## 2021-12-15 PROCEDURE — 97140 MANUAL THERAPY 1/> REGIONS: CPT

## 2021-12-15 PROCEDURE — 97110 THERAPEUTIC EXERCISES: CPT

## 2021-12-15 NOTE — PROGRESS NOTES
Referring Physician: Dr. Milena Bhatti  Diagnosis: Tibialis posterior tendinitis, right (T13.724)      Date of Onset: 2-3/months   Insurance: [facility] [phone] visits authorized  Initial FOTO: 41/100   Projected FOTO: 61/100  FOTO at 5th visit: **/100  FOTO at DC: R -3; L -5  PF: R 60; L 60  INV: R 10; L 17  EV: R 20; L 20    DF: 5  PF: NT DF: 5:   PF: 58  IV:  20  EV: 20 DF 9 deg         goals to be achieved at 8-12 sessions    · Patient will demonstrate  reduction in pain at foot area at rest and with gait down 50 gastroc/soleus  -standing lunge with posterior glide at right talus 10x  -metatarsal Sup/inf mobs II -IV metarsal glide  Passive stretching for toe flexors right   -C/R right hamstrings 4 x 15 secs  -mid tarsel joint mobilization right foot  Manual therapy raises  -BAPS level 3 Med/Lateral 3pm and 9pm and 12 pm and 6 pm taps 15 reps  -instruction in theraband isometric holds for right posterior tibialis tendon seated for home  -standing side step with RTB left and right 4 rounds in II bars   Therap ex:  -Pas

## 2021-12-16 ENCOUNTER — TELEPHONE (OUTPATIENT)
Dept: PHYSICAL THERAPY | Facility: HOSPITAL | Age: 61
End: 2021-12-16

## 2021-12-22 ENCOUNTER — OFFICE VISIT (OUTPATIENT)
Dept: PHYSICAL THERAPY | Facility: HOSPITAL | Age: 61
End: 2021-12-22
Attending: PODIATRIST
Payer: COMMERCIAL

## 2021-12-22 PROCEDURE — 97110 THERAPEUTIC EXERCISES: CPT

## 2021-12-22 PROCEDURE — 97140 MANUAL THERAPY 1/> REGIONS: CPT

## 2021-12-22 NOTE — PROGRESS NOTES
PROGRESS SUMMARY POSSIBLE DC  Referring Physician: Dr. Sindy Mosquera  Diagnosis: Tibialis posterior tendinitis, right (H98.087)      Date of Onset: 2-3/months   Insurance: [facility] [phone] visits authorized  Initial FOTO: 41/100   Projected FOTO: 61/10  FOTO at [facility] L 5/5  Abduction: R 4/5; L 4/5  ER: R 4+/5; L 4+/5   Flexion: R 5-/5; L 5-/5  Extension: R 5-/5; L 5-/5    DF: R 5-/5 soreness; L 5/5  PF: R 4-/5; L 5-/5  INV: R 4+/5pn; L 5-/5  EV: R 5-/5; L 5-/5        Balance:   Initial  SLS: R NT sec, L 9 sec  10/12/21 therapy:  -calcaneal right distractions grade 3 and 4 supine  -STM to achilles, gastroc/soleus  -standing lunge with posterior glide at right talus 10x  -metatarsal Sup/inf mobs II -IV metarsal glide  Passive stretching for toe flexors right   -C/R right h DF AROM for 3 reps 10 deg Therap ex:  -PROM right DF, PF, IV, EV motions to end range 5x  -Passive gastroc stretch supine 4 x 15 secs right ankle  -Sustained isometric 45 sec contraction to right posterior tibialis ms x 3 reps with 2 mins rest  -standing i 12/2/21: Theraband resisted isometric hold for right Posterior tib x3 for 45 secs 2 mins rest Held 12/6/21 12/6/21: added heel raises in II bars 45 secs holds x 3 with 2+ min recovery   12/15/21: resume 2 x 45 secs today if pain continues to resolve jennifer

## 2022-01-19 ENCOUNTER — OFFICE VISIT (OUTPATIENT)
Dept: FAMILY MEDICINE CLINIC | Facility: CLINIC | Age: 62
End: 2022-01-19
Payer: COMMERCIAL

## 2022-01-19 VITALS
SYSTOLIC BLOOD PRESSURE: 117 MMHG | HEART RATE: 86 BPM | TEMPERATURE: 97 F | BODY MASS INDEX: 42 KG/M2 | OXYGEN SATURATION: 97 % | RESPIRATION RATE: 18 BRPM | DIASTOLIC BLOOD PRESSURE: 80 MMHG | WEIGHT: 251 LBS

## 2022-01-19 DIAGNOSIS — R05.9 COUGH: ICD-10-CM

## 2022-01-19 DIAGNOSIS — J01.10 ACUTE NON-RECURRENT FRONTAL SINUSITIS: Primary | ICD-10-CM

## 2022-01-19 PROCEDURE — 99213 OFFICE O/P EST LOW 20 MIN: CPT | Performed by: FAMILY MEDICINE

## 2022-01-19 PROCEDURE — 3074F SYST BP LT 130 MM HG: CPT | Performed by: FAMILY MEDICINE

## 2022-01-19 PROCEDURE — 3079F DIAST BP 80-89 MM HG: CPT | Performed by: FAMILY MEDICINE

## 2022-01-19 RX ORDER — AMOXICILLIN AND CLAVULANATE POTASSIUM 875; 125 MG/1; MG/1
1 TABLET, FILM COATED ORAL 2 TIMES DAILY
Qty: 20 TABLET | Refills: 0 | Status: SHIPPED | OUTPATIENT
Start: 2022-01-19 | End: 2022-01-29

## 2022-01-19 NOTE — PROGRESS NOTES
HPI: Kwame Sullivan is a 57year old female who presents for cough and ear pain. Pt reports she started to feel sluggish before the holiday and had a cold. COVID negative. Felt dizzy and had vertigo. Pt reports that subsided.   She still has cough and sinus pres SpO2 97%   BMI 41.77 kg/m²   HEENT: PERRLA, conjunctive clear. Yohannes ear canals clear. Yohannes TMs intact with good landmarks noted. Tenderness noted on palpation of right frontal and maxillary sinus. Neck supple. Good ROM. No LAD.   Thyroid normal.  CV:  R

## 2022-07-26 ENCOUNTER — HOSPITAL ENCOUNTER (OUTPATIENT)
Dept: GENERAL RADIOLOGY | Age: 62
Discharge: HOME | End: 2022-07-26
Attending: FAMILY MEDICINE
Payer: COMMERCIAL

## 2022-07-26 ENCOUNTER — OFFICE VISIT (OUTPATIENT)
Dept: FAMILY MEDICINE CLINIC | Facility: CLINIC | Age: 62
End: 2022-07-26
Payer: COMMERCIAL

## 2022-07-26 VITALS
SYSTOLIC BLOOD PRESSURE: 114 MMHG | HEART RATE: 88 BPM | RESPIRATION RATE: 20 BRPM | BODY MASS INDEX: 43 KG/M2 | TEMPERATURE: 98 F | WEIGHT: 259 LBS | DIASTOLIC BLOOD PRESSURE: 75 MMHG

## 2022-07-26 DIAGNOSIS — Z12.31 ENCOUNTER FOR SCREENING MAMMOGRAM FOR BREAST CANCER: ICD-10-CM

## 2022-07-26 DIAGNOSIS — M25.562 CHRONIC PAIN OF LEFT KNEE: ICD-10-CM

## 2022-07-26 DIAGNOSIS — Z00.00 ROUTINE PHYSICAL EXAMINATION: ICD-10-CM

## 2022-07-26 DIAGNOSIS — R73.02 IMPAIRED GLUCOSE TOLERANCE: ICD-10-CM

## 2022-07-26 DIAGNOSIS — G89.29 CHRONIC PAIN OF LEFT KNEE: Primary | ICD-10-CM

## 2022-07-26 DIAGNOSIS — M25.562 CHRONIC PAIN OF LEFT KNEE: Primary | ICD-10-CM

## 2022-07-26 DIAGNOSIS — G89.29 CHRONIC PAIN OF LEFT KNEE: ICD-10-CM

## 2022-07-26 PROCEDURE — 99213 OFFICE O/P EST LOW 20 MIN: CPT | Performed by: FAMILY MEDICINE

## 2022-07-26 PROCEDURE — 3074F SYST BP LT 130 MM HG: CPT | Performed by: FAMILY MEDICINE

## 2022-07-26 PROCEDURE — 73562 X-RAY EXAM OF KNEE 3: CPT | Performed by: FAMILY MEDICINE

## 2022-07-26 PROCEDURE — 3078F DIAST BP <80 MM HG: CPT | Performed by: FAMILY MEDICINE

## 2022-09-13 ENCOUNTER — LAB ENCOUNTER (OUTPATIENT)
Dept: LAB | Age: 62
End: 2022-09-13
Attending: FAMILY MEDICINE
Payer: COMMERCIAL

## 2022-09-13 ENCOUNTER — OFFICE VISIT (OUTPATIENT)
Dept: FAMILY MEDICINE CLINIC | Facility: CLINIC | Age: 62
End: 2022-09-13
Payer: COMMERCIAL

## 2022-09-13 VITALS
HEIGHT: 64.57 IN | BODY MASS INDEX: 44.02 KG/M2 | DIASTOLIC BLOOD PRESSURE: 79 MMHG | SYSTOLIC BLOOD PRESSURE: 129 MMHG | WEIGHT: 261 LBS | TEMPERATURE: 98 F | HEART RATE: 76 BPM

## 2022-09-13 DIAGNOSIS — R06.83 SNORING: ICD-10-CM

## 2022-09-13 DIAGNOSIS — R53.83 OTHER FATIGUE: ICD-10-CM

## 2022-09-13 DIAGNOSIS — Z00.00 ROUTINE PHYSICAL EXAMINATION: ICD-10-CM

## 2022-09-13 DIAGNOSIS — R05.3 CHRONIC COUGH: ICD-10-CM

## 2022-09-13 DIAGNOSIS — Z00.00 ROUTINE PHYSICAL EXAMINATION: Primary | ICD-10-CM

## 2022-09-13 DIAGNOSIS — R73.02 IMPAIRED GLUCOSE TOLERANCE: ICD-10-CM

## 2022-09-13 LAB
ALBUMIN SERPL-MCNC: 3.6 G/DL (ref 3.4–5)
ALBUMIN/GLOB SERPL: 0.8 {RATIO} (ref 1–2)
ALP LIVER SERPL-CCNC: 65 U/L
ALT SERPL-CCNC: 24 U/L
ANION GAP SERPL CALC-SCNC: 7 MMOL/L (ref 0–18)
AST SERPL-CCNC: 17 U/L (ref 15–37)
BILIRUB SERPL-MCNC: 0.4 MG/DL (ref 0.1–2)
BUN BLD-MCNC: 15 MG/DL (ref 7–18)
BUN/CREAT SERPL: 17.9 (ref 10–20)
CALCIUM BLD-MCNC: 9.6 MG/DL (ref 8.5–10.1)
CHLORIDE SERPL-SCNC: 106 MMOL/L (ref 98–112)
CHOLEST SERPL-MCNC: 178 MG/DL (ref ?–200)
CO2 SERPL-SCNC: 27 MMOL/L (ref 21–32)
CREAT BLD-MCNC: 0.84 MG/DL
DEPRECATED RDW RBC AUTO: 51.7 FL (ref 35.1–46.3)
ERYTHROCYTE [DISTWIDTH] IN BLOOD BY AUTOMATED COUNT: 15.2 % (ref 11–15)
EST. AVERAGE GLUCOSE BLD GHB EST-MCNC: 131 MG/DL (ref 68–126)
FASTING PATIENT LIPID ANSWER: NO
FASTING STATUS PATIENT QL REPORTED: NO
GFR SERPLBLD BASED ON 1.73 SQ M-ARVRAT: 78 ML/MIN/1.73M2 (ref 60–?)
GLOBULIN PLAS-MCNC: 4.4 G/DL (ref 2.8–4.4)
GLUCOSE BLD-MCNC: 95 MG/DL (ref 70–99)
HBA1C MFR BLD: 6.2 % (ref ?–5.7)
HCT VFR BLD AUTO: 43.3 %
HDLC SERPL-MCNC: 64 MG/DL (ref 40–59)
HGB BLD-MCNC: 13.1 G/DL
LDLC SERPL CALC-MCNC: 96 MG/DL (ref ?–100)
MCH RBC QN AUTO: 27.9 PG (ref 26–34)
MCHC RBC AUTO-ENTMCNC: 30.3 G/DL (ref 31–37)
MCV RBC AUTO: 92.3 FL
NONHDLC SERPL-MCNC: 114 MG/DL (ref ?–130)
OSMOLALITY SERPL CALC.SUM OF ELEC: 291 MOSM/KG (ref 275–295)
PLATELET # BLD AUTO: 372 10(3)UL (ref 150–450)
POTASSIUM SERPL-SCNC: 3.9 MMOL/L (ref 3.5–5.1)
PROT SERPL-MCNC: 8 G/DL (ref 6.4–8.2)
RBC # BLD AUTO: 4.69 X10(6)UL
SODIUM SERPL-SCNC: 140 MMOL/L (ref 136–145)
TRIGL SERPL-MCNC: 102 MG/DL (ref 30–149)
TSI SER-ACNC: 1.82 MIU/ML (ref 0.36–3.74)
VLDLC SERPL CALC-MCNC: 17 MG/DL (ref 0–30)
WBC # BLD AUTO: 5.7 X10(3) UL (ref 4–11)

## 2022-09-13 PROCEDURE — 3008F BODY MASS INDEX DOCD: CPT | Performed by: FAMILY MEDICINE

## 2022-09-13 PROCEDURE — 84443 ASSAY THYROID STIM HORMONE: CPT

## 2022-09-13 PROCEDURE — 99396 PREV VISIT EST AGE 40-64: CPT | Performed by: FAMILY MEDICINE

## 2022-09-13 PROCEDURE — 83036 HEMOGLOBIN GLYCOSYLATED A1C: CPT

## 2022-09-13 PROCEDURE — 85027 COMPLETE CBC AUTOMATED: CPT

## 2022-09-13 PROCEDURE — 3074F SYST BP LT 130 MM HG: CPT | Performed by: FAMILY MEDICINE

## 2022-09-13 PROCEDURE — 80061 LIPID PANEL: CPT

## 2022-09-13 PROCEDURE — 3078F DIAST BP <80 MM HG: CPT | Performed by: FAMILY MEDICINE

## 2022-09-13 PROCEDURE — 80053 COMPREHEN METABOLIC PANEL: CPT

## 2022-09-13 PROCEDURE — 36415 COLL VENOUS BLD VENIPUNCTURE: CPT

## 2022-10-02 ENCOUNTER — OFFICE VISIT (OUTPATIENT)
Dept: SLEEP CENTER | Age: 62
End: 2022-10-02
Attending: INTERNAL MEDICINE
Payer: COMMERCIAL

## 2022-10-02 PROCEDURE — 95806 SLEEP STUDY UNATT&RESP EFFT: CPT

## 2022-11-01 ENCOUNTER — OFFICE VISIT (OUTPATIENT)
Dept: FAMILY MEDICINE CLINIC | Facility: CLINIC | Age: 62
End: 2022-11-01
Payer: COMMERCIAL

## 2022-11-01 VITALS
SYSTOLIC BLOOD PRESSURE: 124 MMHG | TEMPERATURE: 98 F | WEIGHT: 265 LBS | BODY MASS INDEX: 45 KG/M2 | DIASTOLIC BLOOD PRESSURE: 81 MMHG | HEART RATE: 80 BPM | RESPIRATION RATE: 18 BRPM

## 2022-11-01 DIAGNOSIS — G89.29 CHRONIC PAIN OF LEFT KNEE: ICD-10-CM

## 2022-11-01 DIAGNOSIS — M25.562 CHRONIC PAIN OF LEFT KNEE: ICD-10-CM

## 2022-11-01 DIAGNOSIS — G47.33 MODERATE OBSTRUCTIVE SLEEP APNEA: Primary | ICD-10-CM

## 2022-11-01 PROCEDURE — 99213 OFFICE O/P EST LOW 20 MIN: CPT | Performed by: FAMILY MEDICINE

## 2022-11-01 PROCEDURE — 90686 IIV4 VACC NO PRSV 0.5 ML IM: CPT | Performed by: FAMILY MEDICINE

## 2022-11-01 PROCEDURE — 3074F SYST BP LT 130 MM HG: CPT | Performed by: FAMILY MEDICINE

## 2022-11-01 PROCEDURE — 90471 IMMUNIZATION ADMIN: CPT | Performed by: FAMILY MEDICINE

## 2022-11-01 PROCEDURE — 3079F DIAST BP 80-89 MM HG: CPT | Performed by: FAMILY MEDICINE

## 2022-12-19 ENCOUNTER — LAB ENCOUNTER (OUTPATIENT)
Dept: LAB | Age: 62
End: 2022-12-19
Attending: FAMILY MEDICINE
Payer: MEDICARE

## 2022-12-19 ENCOUNTER — OFFICE VISIT (OUTPATIENT)
Dept: FAMILY MEDICINE CLINIC | Facility: CLINIC | Age: 62
End: 2022-12-19
Payer: MEDICARE

## 2022-12-19 VITALS
SYSTOLIC BLOOD PRESSURE: 118 MMHG | HEART RATE: 102 BPM | DIASTOLIC BLOOD PRESSURE: 76 MMHG | WEIGHT: 261 LBS | BODY MASS INDEX: 44 KG/M2 | TEMPERATURE: 98 F

## 2022-12-19 DIAGNOSIS — R20.0 ARM NUMBNESS: Primary | ICD-10-CM

## 2022-12-19 DIAGNOSIS — R20.0 ARM NUMBNESS: ICD-10-CM

## 2022-12-19 LAB
FOLATE SERPL-MCNC: 12.7 NG/ML (ref 8.7–?)
VIT B12 SERPL-MCNC: 485 PG/ML (ref 193–986)
VIT D+METAB SERPL-MCNC: 15.4 NG/ML (ref 30–100)

## 2022-12-19 PROCEDURE — 3074F SYST BP LT 130 MM HG: CPT | Performed by: FAMILY MEDICINE

## 2022-12-19 PROCEDURE — 82607 VITAMIN B-12: CPT

## 2022-12-19 PROCEDURE — 82306 VITAMIN D 25 HYDROXY: CPT

## 2022-12-19 PROCEDURE — 36415 COLL VENOUS BLD VENIPUNCTURE: CPT

## 2022-12-19 PROCEDURE — 82746 ASSAY OF FOLIC ACID SERUM: CPT

## 2022-12-19 PROCEDURE — 3078F DIAST BP <80 MM HG: CPT | Performed by: FAMILY MEDICINE

## 2022-12-19 PROCEDURE — 99214 OFFICE O/P EST MOD 30 MIN: CPT | Performed by: FAMILY MEDICINE

## 2022-12-20 DIAGNOSIS — E55.9 VITAMIN D DEFICIENCY: Primary | ICD-10-CM

## 2022-12-20 RX ORDER — ERGOCALCIFEROL 1.25 MG/1
50000 CAPSULE ORAL WEEKLY
Qty: 8 CAPSULE | Refills: 0 | Status: SHIPPED | OUTPATIENT
Start: 2022-12-20 | End: 2023-01-19

## 2023-01-04 ENCOUNTER — PATIENT MESSAGE (OUTPATIENT)
Dept: FAMILY MEDICINE CLINIC | Facility: CLINIC | Age: 63
End: 2023-01-04

## 2023-01-09 ENCOUNTER — TELEPHONE (OUTPATIENT)
Dept: FAMILY MEDICINE CLINIC | Facility: CLINIC | Age: 63
End: 2023-01-09

## 2023-01-09 NOTE — TELEPHONE ENCOUNTER
Mammogram report received from [facility]; placed on Dr Courtney olson for review (for Dr Jeaneth Wilson). Chart updated.

## 2023-01-10 NOTE — TELEPHONE ENCOUNTER
Please Review. Protocol Failed or has no protocol.        Requested Prescriptions   Pending Prescriptions Disp Refills    ERGOCALCIFEROL 1.25 MG (49430 UT) Oral Cap [Pharmacy Med Name: VITAMIN D2 1.25MG(50,000 UNIT)] 4 capsule 1     Sig: TAKE 1 CAPSULE BY MOUTH ONE TIME PER WEEK       There is no refill protocol information for this order        Recent Outpatient Visits              3 weeks ago Arm numbness    [address] [place] [facility]    Office Visit    2 months ago Moderate obstructive sleep apnea    [address] [place] [facility]    Office Visit    3 months ago     [address]    Office Visit    3 months ago Routine physical examination    [facility]    [facility] Visit    5 months ago Chronic pain of left knee    [facility], [facility], Mart Forde [place]    Office Visit          Future Appointments         Provider Department Appt Notes    In 4 weeks Cristobal Mcgill DO [facility], Deborah Paredes 6 week f/u/bloodwork

## 2023-01-11 RX ORDER — ERGOCALCIFEROL 1.25 MG/1
CAPSULE ORAL
Qty: 4 CAPSULE | Refills: 1 | OUTPATIENT
Start: 2023-01-11

## 2023-02-07 ENCOUNTER — OFFICE VISIT (OUTPATIENT)
Dept: FAMILY MEDICINE CLINIC | Facility: CLINIC | Age: 63
End: 2023-02-07

## 2023-02-07 VITALS
HEART RATE: 87 BPM | DIASTOLIC BLOOD PRESSURE: 77 MMHG | WEIGHT: 263 LBS | BODY MASS INDEX: 44 KG/M2 | RESPIRATION RATE: 18 BRPM | TEMPERATURE: 98 F | SYSTOLIC BLOOD PRESSURE: 118 MMHG

## 2023-02-07 DIAGNOSIS — R73.02 IMPAIRED GLUCOSE TOLERANCE: Primary | ICD-10-CM

## 2023-02-07 DIAGNOSIS — E55.9 VITAMIN D DEFICIENCY: ICD-10-CM

## 2023-02-07 LAB
CARTRIDGE LOT#: ABNORMAL NUMERIC
HEMOGLOBIN A1C: 5.7 % (ref 4.3–5.6)

## 2023-02-07 PROCEDURE — 3074F SYST BP LT 130 MM HG: CPT | Performed by: FAMILY MEDICINE

## 2023-02-07 PROCEDURE — 99213 OFFICE O/P EST LOW 20 MIN: CPT | Performed by: FAMILY MEDICINE

## 2023-02-07 PROCEDURE — 3078F DIAST BP <80 MM HG: CPT | Performed by: FAMILY MEDICINE

## 2023-02-07 PROCEDURE — 83036 HEMOGLOBIN GLYCOSYLATED A1C: CPT | Performed by: FAMILY MEDICINE

## 2023-02-07 RX ORDER — ERGOCALCIFEROL 1.25 MG/1
50000 CAPSULE ORAL WEEKLY
Qty: 4 CAPSULE | Refills: 0 | Status: SHIPPED | OUTPATIENT
Start: 2023-02-07 | End: 2023-03-09

## 2023-04-18 ENCOUNTER — OFFICE VISIT (OUTPATIENT)
Dept: NEUROLOGY | Facility: CLINIC | Age: 63
End: 2023-04-18
Payer: MEDICARE

## 2023-04-18 VITALS — HEIGHT: 64.5 IN | WEIGHT: 263 LBS | BODY MASS INDEX: 44.35 KG/M2

## 2023-04-18 DIAGNOSIS — R20.0 BILATERAL ARM NUMBNESS AND TINGLING WHILE SLEEPING: Primary | ICD-10-CM

## 2023-04-18 DIAGNOSIS — R20.2 BILATERAL ARM NUMBNESS AND TINGLING WHILE SLEEPING: Primary | ICD-10-CM

## 2023-04-18 PROCEDURE — 99204 OFFICE O/P NEW MOD 45 MIN: CPT | Performed by: OTHER

## 2023-04-18 PROCEDURE — 3008F BODY MASS INDEX DOCD: CPT | Performed by: OTHER

## 2023-04-18 NOTE — PATIENT INSTRUCTIONS
Your symptoms could be due to carpal tunnel  syndrome at both wrists, or ulnar nerve compression at both elbows, or a bulging disk in your neck. Wear the wrist splints on both hands.  You can buy them at any [employer] store  Wrap your arm in a  towel at night to keep it straight  Please schedule the EMG  Please get the remainder of the blood work  Please schedule the MRI of your neck

## 2023-08-08 ENCOUNTER — OFFICE VISIT (OUTPATIENT)
Dept: FAMILY MEDICINE CLINIC | Facility: CLINIC | Age: 63
End: 2023-08-08

## 2023-08-08 ENCOUNTER — LAB ENCOUNTER (OUTPATIENT)
Dept: LAB | Age: 63
End: 2023-08-08
Attending: FAMILY MEDICINE
Payer: MEDICARE

## 2023-08-08 VITALS
DIASTOLIC BLOOD PRESSURE: 81 MMHG | HEART RATE: 80 BPM | RESPIRATION RATE: 18 BRPM | SYSTOLIC BLOOD PRESSURE: 129 MMHG | BODY MASS INDEX: 42.99 KG/M2 | TEMPERATURE: 98 F | HEIGHT: 65 IN | WEIGHT: 258 LBS

## 2023-08-08 DIAGNOSIS — E66.01 MORBID OBESITY WITH BMI OF 40.0-44.9, ADULT (HCC): ICD-10-CM

## 2023-08-08 DIAGNOSIS — M25.562 CHRONIC PAIN OF LEFT KNEE: ICD-10-CM

## 2023-08-08 DIAGNOSIS — Z00.00 WELCOME TO MEDICARE PREVENTIVE VISIT: Primary | ICD-10-CM

## 2023-08-08 DIAGNOSIS — G89.29 CHRONIC PAIN OF LEFT KNEE: ICD-10-CM

## 2023-08-08 DIAGNOSIS — M79.671 RIGHT FOOT PAIN: ICD-10-CM

## 2023-08-08 DIAGNOSIS — Z01.419 ENCOUNTER FOR ANNUAL ROUTINE GYNECOLOGICAL EXAMINATION: ICD-10-CM

## 2023-08-08 DIAGNOSIS — Z12.31 ENCOUNTER FOR SCREENING MAMMOGRAM FOR BREAST CANCER: ICD-10-CM

## 2023-08-08 DIAGNOSIS — K57.30 DIVERTICULOSIS OF COLON: ICD-10-CM

## 2023-08-08 DIAGNOSIS — G47.33 MODERATE OBSTRUCTIVE SLEEP APNEA: ICD-10-CM

## 2023-08-08 PROBLEM — R05.9 COUGH: Status: RESOLVED | Noted: 2018-11-01 | Resolved: 2023-08-08

## 2023-08-08 PROBLEM — G47.9 SLEEP DISTURBANCE: Status: RESOLVED | Noted: 2019-10-11 | Resolved: 2023-08-08

## 2023-08-08 PROBLEM — B08.1 MOLLUSCUM CONTAGIOSUM: Status: RESOLVED | Noted: 2019-11-04 | Resolved: 2023-08-08

## 2023-08-08 PROBLEM — R29.3 POOR POSTURE: Status: RESOLVED | Noted: 2019-10-11 | Resolved: 2023-08-08

## 2023-08-08 PROCEDURE — 3079F DIAST BP 80-89 MM HG: CPT | Performed by: FAMILY MEDICINE

## 2023-08-08 PROCEDURE — G0402 INITIAL PREVENTIVE EXAM: HCPCS | Performed by: FAMILY MEDICINE

## 2023-08-08 PROCEDURE — 3074F SYST BP LT 130 MM HG: CPT | Performed by: FAMILY MEDICINE

## 2023-08-08 PROCEDURE — 96160 PT-FOCUSED HLTH RISK ASSMT: CPT | Performed by: FAMILY MEDICINE

## 2023-08-08 PROCEDURE — 3008F BODY MASS INDEX DOCD: CPT | Performed by: FAMILY MEDICINE

## 2023-08-09 LAB
ALBUMIN/GLOBULIN RATIO: 1.2 (CALC) (ref 1–2.5)
ALBUMIN: 3.8 G/DL (ref 3.6–5.1)
ALKALINE PHOSPHATASE: 56 U/L (ref 37–153)
ALT: 11 U/L (ref 6–29)
AST: 14 U/L (ref 10–35)
BILIRUBIN, TOTAL: 0.3 MG/DL (ref 0.2–1.2)
BUN: 16 MG/DL (ref 7–25)
CALCIUM: 9.2 MG/DL (ref 8.6–10.4)
CARBON DIOXIDE: 26 MMOL/L (ref 20–32)
CHLORIDE: 108 MMOL/L (ref 98–110)
CHOL/HDLC RATIO: 3 (CALC)
CHOLESTEROL, TOTAL: 167 MG/DL
CREATININE: 0.67 MG/DL (ref 0.5–1.05)
EGFR: 98 ML/MIN/1.73M2
GLOBULIN: 3.3 G/DL (CALC) (ref 1.9–3.7)
GLUCOSE: 89 MG/DL (ref 65–99)
HDL CHOLESTEROL: 55 MG/DL
HEMATOCRIT: 38.8 % (ref 35–45)
HEMOGLOBIN A1C: 5.9 % OF TOTAL HGB
HEMOGLOBIN: 12.8 G/DL (ref 11.7–15.5)
LDL-CHOLESTEROL: 98 MG/DL (CALC)
MCH: 28.1 PG (ref 27–33)
MCHC: 33 G/DL (ref 32–36)
MCV: 85.3 FL (ref 80–100)
NON-HDL CHOLESTEROL: 112 MG/DL (CALC)
POTASSIUM: 4.2 MMOL/L (ref 3.5–5.3)
PROTEIN, TOTAL: 7.1 G/DL (ref 6.1–8.1)
RDW: 14.2 % (ref 11–15)
RED BLOOD CELL COUNT: 4.55 MILLION/UL (ref 3.8–5.1)
SODIUM: 142 MMOL/L (ref 135–146)
TRIGLYCERIDES: 57 MG/DL
TSH: 1.89 MIU/L (ref 0.4–4.5)
WHITE BLOOD CELL COUNT: 5.7 THOUSAND/UL (ref 3.8–10.8)

## 2023-08-10 LAB — HPV MRNA E6/E7: NOT DETECTED

## 2023-08-19 PROBLEM — M70.62 GREATER TROCHANTERIC BURSITIS OF LEFT HIP: Status: RESOLVED | Noted: 2019-11-07 | Resolved: 2023-08-19

## 2023-08-19 PROBLEM — R29.898 LEFT LEG WEAKNESS: Status: RESOLVED | Noted: 2019-10-11 | Resolved: 2023-08-19

## 2023-08-19 PROBLEM — M25.552 ACUTE HIP PAIN, LEFT: Status: RESOLVED | Noted: 2019-10-21 | Resolved: 2023-08-19

## 2023-08-19 PROBLEM — S76.212A STRAIN OF ADDUCTOR MAGNUS MUSCLE OF LEFT LOWER EXTREMITY: Status: RESOLVED | Noted: 2019-12-12 | Resolved: 2023-08-19

## 2023-08-19 PROBLEM — S82.831A DISPLACED FRACTURE OF DISTAL END OF RIGHT FIBULA: Status: RESOLVED | Noted: 2019-03-06 | Resolved: 2023-08-19

## 2023-08-19 PROBLEM — Z98.890 S/P BUNIONECTOMY: Status: RESOLVED | Noted: 2017-01-26 | Resolved: 2023-08-19

## 2023-08-19 PROBLEM — M54.16 ACUTE LEFT LUMBAR RADICULOPATHY: Status: RESOLVED | Noted: 2019-10-11 | Resolved: 2023-08-19

## 2023-12-13 ENCOUNTER — TELEPHONE (OUTPATIENT)
Dept: PHYSICAL THERAPY | Facility: HOSPITAL | Age: 63
End: 2023-12-13

## 2023-12-14 ENCOUNTER — OFFICE VISIT (OUTPATIENT)
Dept: PHYSICAL THERAPY | Facility: HOSPITAL | Age: 63
End: 2023-12-14
Attending: FAMILY MEDICINE
Payer: MEDICARE

## 2023-12-14 ENCOUNTER — LAB ENCOUNTER (OUTPATIENT)
Dept: LAB | Facility: HOSPITAL | Age: 63
End: 2023-12-14
Attending: FAMILY MEDICINE
Payer: MEDICARE

## 2023-12-14 DIAGNOSIS — Z00.00 WELCOME TO MEDICARE PREVENTIVE VISIT: ICD-10-CM

## 2023-12-14 DIAGNOSIS — E55.9 VITAMIN D DEFICIENCY: ICD-10-CM

## 2023-12-14 DIAGNOSIS — M79.671 RIGHT FOOT PAIN: Primary | ICD-10-CM

## 2023-12-14 DIAGNOSIS — M25.562 CHRONIC PAIN OF LEFT KNEE: ICD-10-CM

## 2023-12-14 DIAGNOSIS — G89.29 CHRONIC PAIN OF LEFT KNEE: ICD-10-CM

## 2023-12-14 LAB
ATRIAL RATE: 72 BPM
P AXIS: 54 DEGREES
P-R INTERVAL: 154 MS
Q-T INTERVAL: 406 MS
QRS DURATION: 80 MS
QTC CALCULATION (BEZET): 444 MS
R AXIS: 18 DEGREES
T AXIS: 28 DEGREES
VENTRICULAR RATE: 72 BPM
VIT D+METAB SERPL-MCNC: 32.6 NG/ML (ref 30–100)

## 2023-12-14 PROCEDURE — 36415 COLL VENOUS BLD VENIPUNCTURE: CPT

## 2023-12-14 PROCEDURE — 97110 THERAPEUTIC EXERCISES: CPT

## 2023-12-14 PROCEDURE — 82306 VITAMIN D 25 HYDROXY: CPT

## 2023-12-14 PROCEDURE — 97163 PT EVAL HIGH COMPLEX 45 MIN: CPT

## 2023-12-14 PROCEDURE — 93005 ELECTROCARDIOGRAM TRACING: CPT

## 2023-12-14 PROCEDURE — 93010 ELECTROCARDIOGRAM REPORT: CPT | Performed by: INTERNAL MEDICINE

## 2023-12-19 ENCOUNTER — APPOINTMENT (OUTPATIENT)
Dept: PHYSICAL THERAPY | Facility: HOSPITAL | Age: 63
End: 2023-12-19
Attending: FAMILY MEDICINE
Payer: MEDICARE

## 2023-12-26 ENCOUNTER — OFFICE VISIT (OUTPATIENT)
Dept: PHYSICAL THERAPY | Facility: HOSPITAL | Age: 63
End: 2023-12-26
Attending: FAMILY MEDICINE
Payer: MEDICARE

## 2023-12-26 PROCEDURE — 97112 NEUROMUSCULAR REEDUCATION: CPT

## 2023-12-26 PROCEDURE — 97140 MANUAL THERAPY 1/> REGIONS: CPT

## 2023-12-26 PROCEDURE — 97110 THERAPEUTIC EXERCISES: CPT

## 2023-12-26 NOTE — PROGRESS NOTES
Diagnosis:    Right foot pain (M79.671)  Chronic pain of left knee (S08.735,W24.62)       Referring Provider: Xander Sanchez  Date of Evaluation:    12/14/2023    Precautions:  None Next MD visit:   none scheduled  Date of Surgery: n/a        Insurance Primary/Secondary: [employer] [facility] / N/A     # Auth Visits: 8            Subjective: She reports mild pain/difficulty with HEP at home. She reports she took medication yesterday to participate in [place] activities which helped and she believes is helping today as well. Pain: 5/10      Objective:   Post Mobilizations: 12/26/2023  L knee extension: - 4  R ankle DF: 6  R ankle PF: 40    Gait: Forward lean, bilaterally lacking terminal knee extension, single point cane on R    At Initial Evaluation:     AROM: (* denotes performed with pain)  Hip Knee Foot/Ankle   Flexion: R 92; L 70*  ER: R 42; L 36  IR: R 40; L 31 Flexion: R 104; L 105*  Extension: R -3; L -5*    DF: R 8*; L 10  PF: R 35*; L 40  INV: R 10*; L 20  EV: R 9; L 10           Strength/MMT: (* denotes performed with pain)  Hip Knee Foot/Ankle   Flexion: R 4+/5; L 3+/5*  Extension: R 3+/5*; L 3+/5*  Abduction: R 5/5; L 4/5*  ER: R 5/5; L 4/5*  IR: R 5/5; L 5/5 Flexion: R 5/5; L 4/5*  Extension: R 5/5; L 4+/5    DF: R 4/5*; L 5/5  PF: R 5/5; L 4/5  INV: R 4/5*; L 5/5  EV: R 4/5*; L 4+/5         Assessment: Patient demonstrates fatigue/difficulty and poor motor control with neuro re-ed exercises, but performs therapeutic exercise well and with good motor control. Patient was unable to complete LAQ or SAQ today, due to inability to lift leg. Manual therapy focused on ankle and knee ROM to improve gait mechanics. Continue to improve LE ROM and LE strengthening to improve gait mechanics and participation in ADL's without pain or difficulty.        Goals:    (to be met in 8-12 visits)  Pt will improve knee extension ROM to 0 deg to allow proper heel strike during gait and terminal knee extension in stance Progressing   Pt will improve knee AROM flexion to >110 degrees to improve ability to perform stair ambulation Progressing   Pt will improve quad strength to 5/5 to ascend 1 flight of stairs reciprocally without UE assist Progressing   Pt will increase hip and knee strength to grossly 4+/5 to be able to get up and down from the floor safely Progressing   Pt will demonstrate increased hip ER/ABD strength to 4+/5 to perform stepping and squatting activities without excessive femoral IR/ADD Progressing   Pt will improve SLS to 6s to improve safety with gait on uneven surfaces such as grass and gravel Progressing   Pt will be independent and compliant with comprehensive HEP to maintain progress achieved in PT  Ongoing  Pt will demonstrate improved DF AROM to >= 10 degrees to promote proper foot clearance during gait and greater ease descending stairs without compensation Progressing  Pt will have increased ankle strength to 5/5 throughout for improved ankle control with ADLs such as prolonged gait and stair negotiation Progressing    Plan: Treatment will include but is not limited to a progression of stretching, strengthening, functional movement training, and manual therapies. HEP reviewed and added heel slides, adduction squeeze and abduction isometrics. Plan on adding bridging, balance activities, stair trials, LAQ, shuttle press SL in future sessions. Date: 12/26/2023  TX#: 2/8-12 Date:                 TX#: 3/ Date:                 TX#: 4/ Date:                 TX#: 5/ Date:    Tx#: 6/   TE: 15 min  Nu-Step: 5 min lvl 1  Shuttle Press #75 DL x 20   Shuttle Press calf raise 50# x 20  H/L Hip Abd 3 sec x 10 (with gait belt)        MT: 15 min  TC Grade 3 mobs - PA/AP  MWM into DF   Knee joint mobs grade 3 - extension       NE: 15 min  Quad Set 5 sec x 10   Heel Slides 5 sec x 5 R/L   SAQ (attempted)  LAQ (attempted)  Adduction ball squeeze in H/L 3 sec hold x 10               HEP:   Access Code: [email or web address]  URL: [email or web address]  Date: 12/14/2023  Prepared by: Scott Ramey     Exercises  - Supine Quad Set  - 1 x daily - 7 x weekly - 2 sets - 10 reps - 3-5 sec hold  - Supine Short Arc Quad  - 1 x daily - 7 x weekly - 2 sets - 10 reps  - Long Sitting Ankle Dorsiflexion with Anchored Resistance  - 1 x daily - 7 x weekly - 2 sets - 10 reps  - Long Sitting Ankle Plantar Flexion with Resistance  - 1 x daily - 7 x weekly - 2 sets - 10 reps  - Long Sitting Ankle Eversion with Resistance  - 1 x daily - 7 x weekly - 2 sets - 10 reps  - Long Sitting Ankle Inversion with Resistance  - 1 x daily - 7 x weekly - 2 sets - 10 reps    Access Code: [email or web address]  URL: [email or web address]  Date: 12/26/2023  Prepared by:  Scott Ramey    Exercises  - Supine Hip Adduction Isometric with Ball  - 1 x daily - 7 x weekly - 2 sets - 10 reps - 3 sec hold  - Hooklying Isometric Hip Abduction with Belt  - 1 x daily - 7 x weekly - 2 sets - 10 reps - 3 sec hold  - Supine Heel Slide with Strap  - 1 x daily - 7 x weekly - 2 sets - 10 reps - 3 sec hold      Charges: 1 TE; 1 MT; 1 NE       Total Timed Treatment: 45 min  Total Treatment Time: 45 min

## 2023-12-29 ENCOUNTER — OFFICE VISIT (OUTPATIENT)
Dept: PHYSICAL THERAPY | Facility: HOSPITAL | Age: 63
End: 2023-12-29
Attending: FAMILY MEDICINE
Payer: MEDICARE

## 2023-12-29 PROCEDURE — 97140 MANUAL THERAPY 1/> REGIONS: CPT

## 2023-12-29 PROCEDURE — 97110 THERAPEUTIC EXERCISES: CPT

## 2023-12-29 PROCEDURE — 97112 NEUROMUSCULAR REEDUCATION: CPT

## 2023-12-29 NOTE — PROGRESS NOTES
Diagnosis:    Right foot pain (M79.671)  Chronic pain of left knee (N34.615,U53.72)       Referring Provider: Jaelyn Boland  Date of Evaluation:    12/14/2023    Precautions:  None Next MD visit:   none scheduled  Date of Surgery: n/a        Insurance Primary/Secondary: [employer] [facility] / N/A     # Auth Visits: 8            Subjective: She reports no soreness after last visit. She reports HEP at home is going well. Pain: 5-6/10 - pt reports it may be due to the weather, but she has not taken any pain pills      Objective:     Knee Flexion:   R: 120 L: 111    Knee Extension:  L: -3     R ankle DF: 6  R ankle PF: 40    Gait: Forward lean, bilaterally lacking terminal knee extension, single point cane on R    At Initial Evaluation:     AROM: (* denotes performed with pain)  Hip Knee Foot/Ankle   Flexion: R 92; L 70*  ER: R 42; L 36  IR: R 40; L 31 Flexion: R 104; L 105*  Extension: R -3; L -5*    DF: R 8*; L 10  PF: R 35*; L 40  INV: R 10*; L 20  EV: R 9; L 10           Strength/MMT: (* denotes performed with pain)  Hip Knee Foot/Ankle   Flexion: R 4+/5; L 3+/5*  Extension: R 3+/5*; L 3+/5*  Abduction: R 5/5; L 4/5*  ER: R 5/5; L 4/5*  IR: R 5/5; L 5/5 Flexion: R 5/5; L 4/5*  Extension: R 5/5; L 4+/5    DF: R 4/5*; L 5/5  PF: R 5/5; L 4/5  INV: R 4/5*; L 5/5  EV: R 4/5*; L 4+/5       Steps at home:  8-9 steps to get into home  12 to get into basement     Assessment: Patient demonstrates increased difficulty with LE strengthening on L leg in comparison to R. Attempted LAQ again today, but pt was unable to participate. Stair trials completed today. Patient can complete stairs in a non-reciprocal fashion requiring 1 UE support, when attempted reciprocal pattern patient reported 9/10 on L today. Patient does demonstrate improvements in BLE knee flexion needed for stair ambulation.      Goals:    (to be met in 8-12 visits)  Pt will improve knee extension ROM to 0 deg to allow proper heel strike during gait and terminal knee extension in stance Progressing   Pt will improve knee AROM flexion to >110 degrees to improve ability to perform stair ambulation Progressing   Pt will improve quad strength to 5/5 to ascend 1 flight of stairs reciprocally without UE assist Progressing   Pt will increase hip and knee strength to grossly 4+/5 to be able to get up and down from the floor safely Progressing   Pt will demonstrate increased hip ER/ABD strength to 4+/5 to perform stepping and squatting activities without excessive femoral IR/ADD Progressing   Pt will improve SLS to 6s to improve safety with gait on uneven surfaces such as grass and gravel Progressing   Pt will be independent and compliant with comprehensive HEP to maintain progress achieved in PT  Ongoing  Pt will demonstrate improved DF AROM to >= 10 degrees to promote proper foot clearance during gait and greater ease descending stairs without compensation Progressing  Pt will have increased ankle strength to 5/5 throughout for improved ankle control with ADLs such as prolonged gait and stair negotiation Progressing    Plan: Treatment will include but is not limited to a progression of stretching, strengthening, functional movement training, and manual therapies. HEP reviewed and added clamshells, bkfo, h/l clamshells and bridges. Plan on adding, eccentric step downs, balance activities, stair trials, LAQ, shuttle press SL in future sessions. Date: 12/26/2023  TX#: 2/8-12 Date:12/29/2023               TX#: 3/-12 Date:                 TX#: 4/ Date:                 TX#: 5/ Date:    Tx#: 6/   TE: 15 min  Nu-Step: 5 min lvl 1  Shuttle Press #75 DL x 20   Shuttle Press calf raise 50# x 20  H/L Hip Abd 3 sec x 10 (with gait belt)  TE: 20 min  Nu-Step: 5 min lvl 1  Shuttle Press #87 DL x 20   Shuttle Press calf raise 50# x 20  Shuttle Press 50# SL x 20 R   Shuttle press 50# SL x 10 L  Shuttle press 25# SL x 10 L  Clamshells YTB x 15 R/L         MT: 15 min  TC Grade 3 mobs - PA/AP  MWM into DF   Knee joint mobs grade 3 - extension MT: 10 min  Knee joint mobs grade 2-3 into knee extension and flexion      NE: 15 min  Quad Set 5 sec x 10   Heel Slides 5 sec x 5 R/L   SAQ (attempted)  LAQ (attempted)  Adduction ball squeeze in H/L 3 sec hold x 10  NE: 15 min  H/L clamshells YTB x 15  BKFO x 10 R/L  Stair ambulation trials 4 inch (3 steps) x 5               HEP:   Access Code: [email or web address]  URL: [email or web address]  Date: 12/14/2023  Prepared by: Acie Radon     Exercises  - Supine Quad Set  - 1 x daily - 7 x weekly - 2 sets - 10 reps - 3-5 sec hold  - Supine Short Arc Quad  - 1 x daily - 7 x weekly - 2 sets - 10 reps  - Long Sitting Ankle Dorsiflexion with Anchored Resistance  - 1 x daily - 7 x weekly - 2 sets - 10 reps  - Long Sitting Ankle Plantar Flexion with Resistance  - 1 x daily - 7 x weekly - 2 sets - 10 reps  - Long Sitting Ankle Eversion with Resistance  - 1 x daily - 7 x weekly - 2 sets - 10 reps  - Long Sitting Ankle Inversion with Resistance  - 1 x daily - 7 x weekly - 2 sets - 10 reps    Access Code: [email or web address]  URL: [email or web address]  Date: 12/26/2023  Prepared by: Acie Radon    Exercises  - Supine Hip Adduction Isometric with Ball  - 1 x daily - 7 x weekly - 2 sets - 10 reps - 3 sec hold  - Hooklying Isometric Hip Abduction with Belt  - 1 x daily - 7 x weekly - 2 sets - 10 reps - 3 sec hold  - Supine Heel Slide with Strap  - 1 x daily - 7 x weekly - 2 sets - 10 reps - 3 sec hold    Access Code: [email or web address]  URL: [email or web address]  Date: 12/29/2023  Prepared by:  Acie Radon    Exercises  - Clamshell with Resistance  - 1 x daily - 7 x weekly - 2 sets - 10 reps  - Supine Bridge  - 1 x daily - 7 x weekly - 2 sets - 10 reps  - Hooklying Clamshell with Resistance  - 1 x daily - 7 x weekly - 2 sets - 10 reps  - Hooklying Single Leg Bent Knee Fallouts with Resistance  - 1 x daily - 7 x weekly - 2 sets - 10 reps      Charges: 1 TE; 1 MT; 1 NE       Total Timed Treatment: 45 min  Total Treatment Time: 45 min

## 2024-01-04 ENCOUNTER — OFFICE VISIT (OUTPATIENT)
Dept: PHYSICAL THERAPY | Facility: HOSPITAL | Age: 64
End: 2024-01-04
Attending: FAMILY MEDICINE
Payer: MEDICARE

## 2024-01-04 PROCEDURE — 97140 MANUAL THERAPY 1/> REGIONS: CPT

## 2024-01-04 PROCEDURE — 97530 THERAPEUTIC ACTIVITIES: CPT

## 2024-01-04 PROCEDURE — 97110 THERAPEUTIC EXERCISES: CPT

## 2024-01-04 NOTE — PROGRESS NOTES
Diagnosis:    Right foot pain (M79.671)  Chronic pain of left knee (M25.562,G89.29)       Referring Provider: Weiler  Date of Evaluation:    12/14/2023    Precautions:  None Next MD visit:   none scheduled  Date of Surgery: n/a        Insurance Primary/Secondary: [employer]MANA [facility] / N/A     # Auth Visits: 8            Subjective: She reports minimal soreness after last session. She reports her foot was feeling a little swollen so she took ibeprofen and believes her swelling went down. Patient reports she has been on her feet a lot this morning helping her grandchild which has increased knee and foot pain.     Pain:   Knee: 5.5/10  Foot: 6/10       Objective:     Knee Flexion:   R: 120 L: 116    Knee Extension:  L: -1    R ankle DF: 9      Gait: Forward lean, bilaterally lacking terminal knee extension, single point cane on R    At Initial Evaluation:     AROM: (* denotes performed with pain)  Hip Knee Foot/Ankle   Flexion: R 92; L 70*  ER: R 42; L 36  IR: R 40; L 31 Flexion: R 104; L 105*  Extension: R -3; L -5*    DF: R 8*; L 10  PF: R 35*; L 40  INV: R 10*; L 20  EV: R 9; L 10           Strength/MMT: (* denotes performed with pain)  Hip Knee Foot/Ankle   Flexion: R 4+/5; L 3+/5*  Extension: R 3+/5*; L 3+/5*  Abduction: R 5/5; L 4/5*  ER: R 5/5; L 4/5*  IR: R 5/5; L 5/5 Flexion: R 5/5; L 4/5*  Extension: R 5/5; L 4+/5    DF: R 4/5*; L 5/5  PF: R 5/5; L 4/5  INV: R 4/5*; L 5/5  EV: R 4/5*; L 4+/5       Steps at home:  8-9 steps to get into home  12 to get into basement     Assessment: Patient demonstrates improvements in ankle and knee ROM since last visit. She also demonstrates improved tolerance with shuttle press activities . Patient was able to complete step up and over on 4 inch step with R LE leading, but unable with L. Patient was able to complete L leading step up and overs on 2 inch step. Patient also demonstrates difficulty with stance/stride weight shifts with L LE leading. Continue to progress strength and ROM  in L knee.       Goals:    (to be met in 8-12 visits)  Pt will improve knee extension ROM to 0 deg to allow proper heel strike during gait and terminal knee extension in stance Progressing   Pt will improve knee AROM flexion to >110 degrees to improve ability to perform stair ambulation MET   Pt will improve quad strength to 5/5 to ascend 1 flight of stairs reciprocally without UE assist Progressing   Pt will increase hip and knee strength to grossly 4+/5 to be able to get up and down from the floor safely Progressing   Pt will demonstrate increased hip ER/ABD strength to 4+/5 to perform stepping and squatting activities without excessive femoral IR/ADD Progressing   Pt will improve SLS to 6s to improve safety with gait on uneven surfaces such as grass and gravel Progressing   Pt will be independent and compliant with comprehensive HEP to maintain progress achieved in PT  Ongoing  Pt will demonstrate improved DF AROM to >= 10 degrees to promote proper foot clearance during gait and greater ease descending stairs without compensation Progressing  Pt will have increased ankle strength to 5/5 throughout for improved ankle control with ADLs such as prolonged gait and stair negotiation Progressing    Plan: Treatment will include but is not limited to a progression of stretching, strengthening, functional movement training, and manual therapies. HEP reviewed and added stride/stance WS and heel raises . Plan on adding, eccentric step downs, balance activities, stair trials, LAQ, shuttle press SL in future sessions.    Date: 12/26/2023  TX#: 2/8-12 Date:12/29/2023               TX#: 3/-12 Date: 1/4/2024               TX#: 4/8-12 Date:                 TX#: 5/ Date:   Tx#: 6/   TE: 15 min  Nu-Step: 5 min lvl 1  Shuttle Press #75 DL x 20   Shuttle Press calf raise 50# x 20  H/L Hip Abd 3 sec x 10 (with gait belt)  TE: 20 min  Nu-Step: 5 min lvl 1  Shuttle Press #87 DL x 20   Shuttle Press calf raise 50# x 20  Shuttle Press 50#  SL x 20 R   Shuttle press 50# SL x 10 L  Shuttle press 25# SL x 10 L  Clamshells YTB x 15 R/L    TE: 20 min  Shuttle Press #100 DL x 20   Shuttle Press calf raise 62# x 20  Shuttle Press 62# SL x 20 R /L  Hamstring curls RTB x 15 (L)  SLR x 15 (L)  Heel raises x 10 BLE     MT: 15 min  TC Grade 3 mobs - PA/AP  MWM into DF   Knee joint mobs grade 3 - extension MT: 10 min  Knee joint mobs grade 2-3 into knee extension and flexion MT: 10 min  MWM DF   TC Joint mobs grade 3        NE: 15 min  Quad Set 5 sec x 10   Heel Slides 5 sec x 5 R/L   SAQ (attempted)  LAQ (attempted)  Adduction ball squeeze in H/L 3 sec hold x 10  NE: 15 min  H/L clamshells YTB x 15  BKFO x 10 R/L  Stair ambulation trials 4 inch (3 steps) x 5   TA: 15 min  Step up and over 4' x 10 R  Step up and over 2' x 10 L  Stride/Stance WS R x 10   Side/Stance WS L (unable)  Eccentric step downs 2' R x 10   Eccentric step downs 2' (unable)               HEP:   Access Code: [email or web address]  URL: [email or web address]  Date: 12/14/2023  Prepared by: Rian Rubio     Exercises  - Supine Quad Set  - 1 x daily - 7 x weekly - 2 sets - 10 reps - 3-5 sec hold  - Supine Short Arc Quad  - 1 x daily - 7 x weekly - 2 sets - 10 reps  - Long Sitting Ankle Dorsiflexion with Anchored Resistance  - 1 x daily - 7 x weekly - 2 sets - 10 reps  - Long Sitting Ankle Plantar Flexion with Resistance  - 1 x daily - 7 x weekly - 2 sets - 10 reps  - Long Sitting Ankle Eversion with Resistance  - 1 x daily - 7 x weekly - 2 sets - 10 reps  - Long Sitting Ankle Inversion with Resistance  - 1 x daily - 7 x weekly - 2 sets - 10 reps    Access Code: [email or web address]  URL: [email or web address]  Date: 12/26/2023  Prepared by: Rian Rubio    Exercises  - Supine Hip Adduction Isometric with Ball  - 1 x daily - 7 x weekly - 2 sets - 10 reps - 3 sec hold  - Hooklying Isometric Hip Abduction with Belt  - 1 x daily - 7 x weekly - 2 sets - 10 reps - 3 sec hold  - Supine Heel Slide with Strap  - 1 x  daily - 7 x weekly - 2 sets - 10 reps - 3 sec hold    Access Code: [email or web address]  URL: [email or web address]  Date: 12/29/2023  Prepared by: Rian Rubio    Exercises  - Clamshell with Resistance  - 1 x daily - 7 x weekly - 2 sets - 10 reps  - Supine Bridge  - 1 x daily - 7 x weekly - 2 sets - 10 reps  - Hooklying Clamshell with Resistance  - 1 x daily - 7 x weekly - 2 sets - 10 reps  - Hooklying Single Leg Bent Knee Fallouts with Resistance  - 1 x daily - 7 x weekly - 2 sets - 10 reps    Access Code: [email or web address]  URL: [email or web address]  Date: 01/04/2024  Prepared by: Rian Rubio    Exercises  - Stride Stance Weight Shift  - 1 x daily - 7 x weekly - 2 sets - 10 reps  - Heel Raises with Counter Support  - 1 x daily - 7 x weekly - 2 sets - 10 reps      Charges: 1 TE; 1 MT; 1 NE       Total Timed Treatment: 45 min  Total Treatment Time: 45 min

## 2024-01-08 ENCOUNTER — APPOINTMENT (OUTPATIENT)
Dept: PHYSICAL THERAPY | Facility: HOSPITAL | Age: 64
End: 2024-01-08
Attending: FAMILY MEDICINE
Payer: MEDICARE

## 2024-01-10 ENCOUNTER — OFFICE VISIT (OUTPATIENT)
Dept: PHYSICAL THERAPY | Facility: HOSPITAL | Age: 64
End: 2024-01-10
Attending: FAMILY MEDICINE
Payer: MEDICARE

## 2024-01-10 PROCEDURE — 97110 THERAPEUTIC EXERCISES: CPT

## 2024-01-10 PROCEDURE — 97112 NEUROMUSCULAR REEDUCATION: CPT

## 2024-01-10 NOTE — PROGRESS NOTES
Diagnosis:    Right foot pain (M79.671)  Chronic pain of left knee (M25.562,G89.29)       Referring Provider: Weiler  Date of Evaluation:    12/14/2023    Precautions:  None Next MD visit:   none scheduled  Date of Surgery: n/a        Insurance Primary/Secondary: [employer] [facility] / N/A     # Auth Visits: 8            Subjective: She reports she has been completing HEP. She reports she is able to perform a sit to stand without UE support. She reports she took pain medication today and is having minimal pain. She reports she notices she is able to ambulate without AD, more than she use to at home, but is using one today due to weather conditions.    Pain:   Knee: 4/10  Foot: 0/10       Objective:   Current: 1/10/2024  Knee ROM:  Flexion:R: 120 and L: 0  Extension: R:118 and L: 0     Balance:   Tandem Balance: R foot in front: 23 sec L foot in front: 30 sec      Gait: Forward lean and single point cane on R    At Initial Evaluation:     AROM: (* denotes performed with pain)  Hip Knee Foot/Ankle   Flexion: R 92; L 70*  ER: R 42; L 36  IR: R 40; L 31 Flexion: R 104; L 105*  Extension: R -3; L -5*    DF: R 8*; L 10  PF: R 35*; L 40  INV: R 10*; L 20  EV: R 9; L 10           Strength/MMT: (* denotes performed with pain)  Hip Knee Foot/Ankle   Flexion: R 4+/5; L 3+/5*  Extension: R 3+/5*; L 3+/5*  Abduction: R 5/5; L 4/5*  ER: R 5/5; L 4/5*  IR: R 5/5; L 5/5 Flexion: R 5/5; L 4/5*  Extension: R 5/5; L 4+/5    DF: R 4/5*; L 5/5  PF: R 5/5; L 4/5  INV: R 4/5*; L 5/5  EV: R 4/5*; L 4+/5       Steps at home:  8-9 steps to get into home  12 to get into basement     Assessment: Patient demonstrates fatigue today, having difficulty with shuttle press activities, rest breaks taken. She demonstrates improving knee ROM. Added TRX mini squats, patient has difficulty with this exercises, is able to squat down approximately 15 to 20 degrees. Added heel to toe walking, providing verbal cueing to stand up straight and limit forward lean.  Patient does well with this activity, demonstrating a very minimal forward lean. Adjusted patient's cane today was well, she had it raised too high, which also may be contributing to forward lean. Continue to improve LE strength need to increase patient participation in ADL's without pain or limitations.     Goals:    (to be met in 8-12 visits)  Pt will improve knee extension ROM to 0 deg to allow proper heel strike during gait and terminal knee extension in stance MET   Pt will improve knee AROM flexion to >110 degrees to improve ability to perform stair ambulation MET   Pt will improve quad strength to 5/5 to ascend 1 flight of stairs reciprocally without UE assist Progressing   Pt will increase hip and knee strength to grossly 4+/5 to be able to get up and down from the floor safely Progressing   Pt will demonstrate increased hip ER/ABD strength to 4+/5 to perform stepping and squatting activities without excessive femoral IR/ADD Progressing   Pt will improve SLS to 6s to improve safety with gait on uneven surfaces such as grass and gravel Progressing   Pt will be independent and compliant with comprehensive HEP to maintain progress achieved in PT  Ongoing  Pt will demonstrate improved DF AROM to >= 10 degrees to promote proper foot clearance during gait and greater ease descending stairs without compensation Progressing  Pt will have increased ankle strength to 5/5 throughout for improved ankle control with ADLs such as prolonged gait and stair negotiation Progressing    Plan: Treatment will include but is not limited to a progression of stretching, strengthening, functional movement training, and manual therapies. HEP reviewed. Plan on adding, eccentric step downs, balance activities, stair trials, LAQ, shuttle press SL in future sessions.    Date: 12/26/2023  TX#: 2/8-12 Date:12/29/2023               TX#: 3/-12 Date: 1/4/2024               TX#: 4/8-12 Date: 1/10/2024             TX#: 5/8-12 Date:   Tx#: 6/    TE: 15 min  Nu-Step: 5 min lvl 1  Shuttle Press #75 DL x 20   Shuttle Press calf raise 50# x 20  H/L Hip Abd 3 sec x 10 (with gait belt)  TE: 20 min  Nu-Step: 5 min lvl 1  Shuttle Press #87 DL x 20   Shuttle Press calf raise 50# x 20  Shuttle Press 50# SL x 20 R   Shuttle press 50# SL x 10 L  Shuttle press 25# SL x 10 L  Clamshells YTB x 15 R/L    TE: 20 min  Shuttle Press #100 DL x 20   Shuttle Press calf raise 62# x 20  Shuttle Press 62# SL x 20 R /L  Hamstring curls RTB x 15 (L)  SLR x 15 (L)  Heel raises x 10 BLE TE: 25 min  Nu-Step: 5 min level 5  Shuttle Press #100 DL x 20   Shuttle Press calf raise 50# x 20  Shuttle Press 50# SL x 20 R /L  Bridges x 20       MT: 15 min  TC Grade 3 mobs - PA/AP  MWM into DF   Knee joint mobs grade 3 - extension MT: 10 min  Knee joint mobs grade 2-3 into knee extension and flexion MT: 10 min  MWM DF   TC Joint mobs grade 3        NE: 15 min  Quad Set 5 sec x 10   Heel Slides 5 sec x 5 R/L   SAQ (attempted)  LAQ (attempted)  Adduction ball squeeze in H/L 3 sec hold x 10  NE: 15 min  H/L clamshells YTB x 15  BKFO x 10 R/L  Stair ambulation trials 4 inch (3 steps) x 5   TA: 15 min  Step up and over 4' x 10 R  Step up and over 2' x 10 L  Stride/Stance WS R x 10   Side/Stance WS L (unable)  Eccentric step downs 2' R x 10   Eccentric step downs 2' (unable)             NE: 20 min  TRX mini squats x 20  Tandem balance 2 x 30 sec  Heel to toe walking 5 ft x 4  AD education: height where it should be, how to alter    HEP:   Access Code: [email or web address]  URL: [email or web address]  Date: 12/14/2023  Prepared by: Rian Rubio     Exercises  - Supine Quad Set  - 1 x daily - 7 x weekly - 2 sets - 10 reps - 3-5 sec hold  - Supine Short Arc Quad  - 1 x daily - 7 x weekly - 2 sets - 10 reps  - Long Sitting Ankle Dorsiflexion with Anchored Resistance  - 1 x daily - 7 x weekly - 2 sets - 10 reps  - Long Sitting Ankle Plantar Flexion with Resistance  - 1 x daily - 7 x weekly - 2 sets - 10  reps  - Long Sitting Ankle Eversion with Resistance  - 1 x daily - 7 x weekly - 2 sets - 10 reps  - Long Sitting Ankle Inversion with Resistance  - 1 x daily - 7 x weekly - 2 sets - 10 reps    Access Code: [email or web address]  URL: [email or web address]  Date: 12/26/2023  Prepared by: Rian Rubio    Exercises  - Supine Hip Adduction Isometric with Ball  - 1 x daily - 7 x weekly - 2 sets - 10 reps - 3 sec hold  - Hooklying Isometric Hip Abduction with Belt  - 1 x daily - 7 x weekly - 2 sets - 10 reps - 3 sec hold  - Supine Heel Slide with Strap  - 1 x daily - 7 x weekly - 2 sets - 10 reps - 3 sec hold    Access Code: [email or web address]  URL: [email or web address]  Date: 12/29/2023  Prepared by: Rian Rubio    Exercises  - Clamshell with Resistance  - 1 x daily - 7 x weekly - 2 sets - 10 reps  - Supine Bridge  - 1 x daily - 7 x weekly - 2 sets - 10 reps  - Hooklying Clamshell with Resistance  - 1 x daily - 7 x weekly - 2 sets - 10 reps  - Hooklying Single Leg Bent Knee Fallouts with Resistance  - 1 x daily - 7 x weekly - 2 sets - 10 reps    Access Code: [email or web address]  URL: [email or web address]  Date: 01/04/2024  Prepared by: Rian Rubio    Exercises  - Stride Stance Weight Shift  - 1 x daily - 7 x weekly - 2 sets - 10 reps  - Heel Raises with Counter Support  - 1 x daily - 7 x weekly - 2 sets - 10 reps      Charges: 2 TE; 1 NE       Total Timed Treatment: 40 min  Total Treatment Time: 45 min

## 2024-01-15 ENCOUNTER — TELEPHONE (OUTPATIENT)
Dept: PHYSICAL THERAPY | Facility: HOSPITAL | Age: 64
End: 2024-01-15

## 2024-01-16 ENCOUNTER — TELEPHONE (OUTPATIENT)
Dept: PHYSICAL THERAPY | Facility: HOSPITAL | Age: 64
End: 2024-01-16

## 2024-01-26 ENCOUNTER — OFFICE VISIT (OUTPATIENT)
Dept: PHYSICAL THERAPY | Facility: HOSPITAL | Age: 64
End: 2024-01-26
Attending: FAMILY MEDICINE
Payer: MEDICARE

## 2024-01-26 PROCEDURE — 97140 MANUAL THERAPY 1/> REGIONS: CPT

## 2024-01-26 PROCEDURE — 97112 NEUROMUSCULAR REEDUCATION: CPT

## 2024-01-26 PROCEDURE — 97110 THERAPEUTIC EXERCISES: CPT

## 2024-01-26 NOTE — PROGRESS NOTES
Diagnosis:    Right foot pain (M79.671)  Chronic pain of left knee (M25.562,G89.29)       Referring Provider: Weiler  Date of Evaluation:    12/14/2023    Precautions:  None Next MD visit:   none scheduled  Date of Surgery: n/a        Insurance Primary/Secondary: [employer] [facility] / N/A     # Auth Visits: 8            Subjective: She reports her knee is a little tight today. She reports she has been going to [employer] and is having difficulty with exercise that requires to \"push her leg up\" (quad extensions).    Pain:   Knee: 0/10  Foot: 0/10       Objective:   1/26/24:  L AROM:   Knee flexion: 120   Hip flexion: 78 in supine (PROM WNL weakness limiting factor)        1/10/2024  Knee ROM:  Flexion:R: 120 and L: 118  Extension: R:0 and L: 0           Assessment: Patient demonstrates significant hip flexor weakness on L LE. She has significant difficulty with LAQ, hip flexion seated and standing, and with SLR. Added these to HEP to have patient focus on these areas in order to improve participation in ADL's, such as stair ambulation. She also reports hip pain with these activities, reporting hip mobs feel good.     Goals:    (to be met in 8-12 visits)  Pt will improve knee extension ROM to 0 deg to allow proper heel strike during gait and terminal knee extension in stance MET   Pt will improve knee AROM flexion to >110 degrees to improve ability to perform stair ambulation MET   Pt will improve quad strength to 5/5 to ascend 1 flight of stairs reciprocally without UE assist Progressing   Pt will increase hip and knee strength to grossly 4+/5 to be able to get up and down from the floor safely Progressing   Pt will demonstrate increased hip ER/ABD strength to 4+/5 to perform stepping and squatting activities without excessive femoral IR/ADD Progressing   Pt will improve SLS to 6s to improve safety with gait on uneven surfaces such as grass and gravel Progressing   Pt will be independent and compliant with comprehensive  HEP to maintain progress achieved in PT  Ongoing  Pt will demonstrate improved DF AROM to >= 10 degrees to promote proper foot clearance during gait and greater ease descending stairs without compensation Progressing  Pt will have increased ankle strength to 5/5 throughout for improved ankle control with ADLs such as prolonged gait and stair negotiation Progressing    Plan: Treatment will include but is not limited to a progression of stretching, strengthening, functional movement training, and manual therapies. Plan on adding, eccentric step downs, balance activities, stair trials, LAQ, shuttle press SL in future sessions.    Date: 12/26/2023  TX#: 2/8-12 Date:12/29/2023               TX#: 3/-12 Date: 1/4/2024               TX#: 4/8-12 Date: 1/10/2024             TX#: 5/8-12 Date: 1/26/2024  Tx#: 6/8-12   TE: 15 min  Nu-Step: 5 min lvl 1  Shuttle Press #75 DL x 20   Shuttle Press calf raise 50# x 20  H/L Hip Abd 3 sec x 10 (with gait belt)  TE: 20 min  Nu-Step: 5 min lvl 1  Shuttle Press #87 DL x 20   Shuttle Press calf raise 50# x 20  Shuttle Press 50# SL x 20 R   Shuttle press 50# SL x 10 L  Shuttle press 25# SL x 10 L  Clamshells YTB x 15 R/L    TE: 20 min  Shuttle Press #100 DL x 20   Shuttle Press calf raise 62# x 20  Shuttle Press 62# SL x 20 R /L  Hamstring curls RTB x 15 (L)  SLR x 15 (L)  Heel raises x 10 BLE TE: 25 min  Nu-Step: 5 min level 5  Shuttle Press #100 DL x 20   Shuttle Press calf raise 50# x 20  Shuttle Press 50# SL x 20 R /L  Bridges x 20    TE: 20 min  LAQ 3 sec hold x 10 L  Shuttle Press #100 DL x 20   Shuttle Press #62 SL x R (attempted on L unable)  Shuttle Press calf raise 50# x 20  Shuttle Press 50# SL x 20 L     MT: 15 min  TC Grade 3 mobs - PA/AP  MWM into DF   Knee joint mobs grade 3 - extension MT: 10 min  Knee joint mobs grade 2-3 into knee extension and flexion MT: 10 min  MWM DF   TC Joint mobs grade 3     MT: 10 min  Inferior and lateral joint mobs - L hip w/ belt   NE: 15  min  Quad Set 5 sec x 10   Heel Slides 5 sec x 5 R/L   SAQ (attempted)  LAQ (attempted)  Adduction ball squeeze in H/L 3 sec hold x 10  NE: 15 min  H/L clamshells YTB x 15  BKFO x 10 R/L  Stair ambulation trials 4 inch (3 steps) x 5   TA: 15 min  Step up and over 4' x 10 R  Step up and over 2' x 10 L  Stride/Stance WS R x 10   Side/Stance WS L (unable)  Eccentric step downs 2' R x 10   Eccentric step downs 2' (unable)       NE: 15 min  SLR x 15 L  Seated hip flexion 3 sec hold x 10 L   Standing hip flexion 3 sec hold x 10 L  Standing hip flexion 2 x 30 sec L      NE: 20 min  TRX mini squats x 20  Tandem balance 2 x 30 sec  Heel to toe walking 5 ft x 4  AD education: height where it should be, how to alter    HEP:   Access Code: [email or web address]  URL: [email or web address]  Date: 12/14/2023  Prepared by: Rian Rubio     Exercises  - Supine Quad Set  - 1 x daily - 7 x weekly - 2 sets - 10 reps - 3-5 sec hold  - Supine Short Arc Quad  - 1 x daily - 7 x weekly - 2 sets - 10 reps  - Long Sitting Ankle Dorsiflexion with Anchored Resistance  - 1 x daily - 7 x weekly - 2 sets - 10 reps  - Long Sitting Ankle Plantar Flexion with Resistance  - 1 x daily - 7 x weekly - 2 sets - 10 reps  - Long Sitting Ankle Eversion with Resistance  - 1 x daily - 7 x weekly - 2 sets - 10 reps  - Long Sitting Ankle Inversion with Resistance  - 1 x daily - 7 x weekly - 2 sets - 10 reps    Access Code: [email or web address]  URL: [email or web address]  Date: 12/26/2023  Prepared by: Rian Rubio    Exercises  - Supine Hip Adduction Isometric with Ball  - 1 x daily - 7 x weekly - 2 sets - 10 reps - 3 sec hold  - Hooklying Isometric Hip Abduction with Belt  - 1 x daily - 7 x weekly - 2 sets - 10 reps - 3 sec hold  - Supine Heel Slide with Strap  - 1 x daily - 7 x weekly - 2 sets - 10 reps - 3 sec hold    Access Code: [email or web address]  URL: [email or web address]  Prepared by: Rian Galicia  - Alvarez with Resistance  - 1 x daily - 7 x weekly  - 2 sets - 10 reps  - Supine Bridge  - 1 x daily - 7 x weekly - 2 sets - 10 reps  - Hooklying Clamshell with Resistance  - 1 x daily - 7 x weekly - 2 sets - 10 reps  - Hooklying Single Leg Bent Knee Fallouts with Resistance  - 1 x daily - 7 x weekly - 2 sets - 10 reps  - Seated Hip Flexion March with Ankle Weights  - 1 x daily - 7 x weekly - 2 sets - 10 reps - 3 sec hold  - Supine Active Straight Leg Raise  - 1 x daily - 7 x weekly - 2 sets - 10 reps - 3 sec hold  - Seated Long Arc Quad  - 1 x daily - 7 x weekly - 2 sets - 10 reps - 3 sec hold  - Standing Hip Flexion March  - 1 x daily - 7 x weekly - 2 sets - 10 reps - 5 sec hold    Access Code: [email or web address]  URL: [email or web address]    Exercises  - Stride Stance Weight Shift  - 1 x daily - 7 x weekly - 2 sets - 10 reps  - Heel Raises with Counter Support  - 1 x daily - 7 x weekly - 2 sets - 10 reps      Charges: 1 TE; 1 NE  ; 1 MT     Total Timed Treatment: 45 min  Total Treatment Time: 45 min

## 2024-02-07 ENCOUNTER — TELEPHONE (OUTPATIENT)
Dept: PHYSICAL THERAPY | Facility: HOSPITAL | Age: 64
End: 2024-02-07

## 2024-02-15 ENCOUNTER — OFFICE VISIT (OUTPATIENT)
Dept: PHYSICAL THERAPY | Facility: HOSPITAL | Age: 64
End: 2024-02-15
Attending: FAMILY MEDICINE
Payer: MEDICARE

## 2024-02-15 PROCEDURE — 97110 THERAPEUTIC EXERCISES: CPT

## 2024-02-15 PROCEDURE — 97112 NEUROMUSCULAR REEDUCATION: CPT

## 2024-02-15 NOTE — PROGRESS NOTES
Progress Summary  Pt has attended 7 visits in Physical Therapy.    Diagnosis:    Right foot pain (M79.671)  Chronic pain of left knee (M25.562,G89.29)       Referring Provider: Weiler  Date of Evaluation:    12/14/2023    Precautions:  None Next MD visit:   none scheduled  Date of Surgery: n/a        Insurance Primary/Secondary: [employer] [facility] / N/A     # Auth Visits: 14         Subjective: She reports minimal pain in her knee recently. She reports no foot pain at all recently as well. She reports she has been participating in HEP and exercises at the gym. She reports occasional pain in her knee with stair ascending.     Pain:   Knee: 3-4/10  Foot: 0/10       Objective:   Current: 2/15/2024  Knee - AROM Knee - MMT   Flexion: R 122; L 122  Extension: R 0; L 0 Flexion: R 5/5; L 5/5  Extension: R 5/5; L 5/5       Foot/Ankle Foot/Ankle   DF - R 10 DF: R 5/5;   PF: R 5/5;  INV: R 5/5  EV: R 5/5           Assessment: Patient has not been seen for a few weeks, reassessed. Patient demonstrates improvements in ankle and knee ROM and strength since last visit. She reports no foot pain as well. Added step up's to therapy, patient demonstrates no difficulty with R LE leading, but demonstrates increased difficulty with L LE leading. She demonstrates increased ease with repetitions. Discussed discharge next visit, as patient reports she will continue to participate in extensive HEP and strengthening at the gym.     Goals:    (to be met in 8-12 visits)  Pt will improve knee extension ROM to 0 deg to allow proper heel strike during gait and terminal knee extension in stance MET   Pt will improve knee AROM flexion to >110 degrees to improve ability to perform stair ambulation MET   Pt will improve quad strength to 5/5 to ascend 1 flight of stairs reciprocally without UE assist MET   Pt will increase hip and knee strength to grossly 4+/5 to be able to get up and down from the floor safely Progressing   Pt will demonstrate increased hip  ER/ABD strength to 4+/5 to perform stepping and squatting activities without excessive femoral IR/ADD Progressing   Pt will improve SLS to 6s to improve safety with gait on uneven surfaces such as grass and gravel Progressing   Pt will be independent and compliant with comprehensive HEP to maintain progress achieved in PT  MET  Pt will demonstrate improved DF AROM to >= 10 degrees to promote proper foot clearance during gait and greater ease descending stairs without compensation MET  Pt will have increased ankle strength to 5/5 throughout for improved ankle control with ADLs such as prolonged gait and stair negotiation MET    Plan: Discharge next visit, providing extensive HEP to continue to maintain progress and strength made.   Date: 12/26/2023  TX#: 2/8-12 Date:12/29/2023               TX#: 3/-12 Date: 1/4/2024               TX#: 4/8-12 Date: 1/10/2024             TX#: 5/8-12 Date: 1/26/2024  Tx#: 6/8-12 Date: 2/15/2024  Tx #: 7/8-12  [place]   TE: 15 min  Nu-Step: 5 min lvl 1  Shuttle Press #75 DL x 20   Shuttle Press calf raise 50# x 20  H/L Hip Abd 3 sec x 10 (with gait belt)  TE: 20 min  Nu-Step: 5 min lvl 1  Shuttle Press #87 DL x 20   Shuttle Press calf raise 50# x 20  Shuttle Press 50# SL x 20 R   Shuttle press 50# SL x 10 L  Shuttle press 25# SL x 10 L  Clamshells YTB x 15 R/L    TE: 20 min  Shuttle Press #100 DL x 20   Shuttle Press calf raise 62# x 20  Shuttle Press 62# SL x 20 R /L  Hamstring curls RTB x 15 (L)  SLR x 15 (L)  Heel raises x 10 BLE TE: 25 min  Nu-Step: 5 min level 5  Shuttle Press #100 DL x 20   Shuttle Press calf raise 50# x 20  Shuttle Press 50# SL x 20 R /L  Bridges x 20    TE: 20 min  LAQ 3 sec hold x 10 L  Shuttle Press #100 DL x 20   Shuttle Press #62 SL x R (attempted on L unable)  Shuttle Press calf raise 50# x 20  Shuttle Press 50# SL x 20 L   TE: 20 min  Glute bridging w/ add squeeze 2 x 10   SB hamstring curl x 20   Shuttle Press #105 DL x 20   Shuttle Press calf raise 55# x  20  Shuttle Press 55# SL x 20 L   MT: 15 min  TC Grade 3 mobs - PA/AP  MWM into DF   Knee joint mobs grade 3 - extension MT: 10 min  Knee joint mobs grade 2-3 into knee extension and flexion MT: 10 min  MWM DF   TC Joint mobs grade 3     MT: 10 min  Inferior and lateral joint mobs - L hip w/ belt    NE: 15 min  Quad Set 5 sec x 10   Heel Slides 5 sec x 5 R/L   SAQ (attempted)  LAQ (attempted)  Adduction ball squeeze in H/L 3 sec hold x 10  NE: 15 min  H/L clamshells YTB x 15  BKFO x 10 R/L  Stair ambulation trials 4 inch (3 steps) x 5   TA: 15 min  Step up and over 4' x 10 R  Step up and over 2' x 10 L  Stride/Stance WS R x 10   Side/Stance WS L (unable)  Eccentric step downs 2' R x 10   Eccentric step downs 2' (unable)       NE: 15 min  SLR x 15 L  Seated hip flexion 3 sec hold x 10 L   Standing hip flexion 3 sec hold x 10 L  Standing hip flexion 2 x 30 sec L NE: 25 min  FWD and LAT Step up's 4' x 10 R LE leading  FWD and LAT Step up's x 20 L LE leading  Standing hip flexion YTB 3 sec hold 2 x 10 R/L  Standing hip abduction YTB 2 x 10 R/L  Eccentric FWD heel taps 4' x 10 R  Weight shifts with manual cue x 10 L  Eccentric FWD heel taps 2' x 10 L (altered due to difficulty putting weight on L knee in flexion)         NE: 20 min  TRX mini squats x 20  Tandem balance 2 x 30 sec  Heel to toe walking 5 ft x 4  AD education: height where it should be, how to alter     HEP:   Access Code: [email or web address]  URL: [email or web address]  Date: 12/14/2023  Prepared by: Rian Rubio     Exercises  - Supine Quad Set  - 1 x daily - 7 x weekly - 2 sets - 10 reps - 3-5 sec hold  - Supine Short Arc Quad  - 1 x daily - 7 x weekly - 2 sets - 10 reps  - Long Sitting Ankle Dorsiflexion with Anchored Resistance  - 1 x daily - 7 x weekly - 2 sets - 10 reps  - Long Sitting Ankle Plantar Flexion with Resistance  - 1 x daily - 7 x weekly - 2 sets - 10 reps  - Long Sitting Ankle Eversion with Resistance  - 1 x daily - 7 x weekly - 2 sets - 10  reps  - Long Sitting Ankle Inversion with Resistance  - 1 x daily - 7 x weekly - 2 sets - 10 reps    Access Code: [email or web address]  URL: [email or web address]  Date: 12/26/2023  Prepared by: Rian Rubio    Exercises  - Supine Hip Adduction Isometric with Ball  - 1 x daily - 7 x weekly - 2 sets - 10 reps - 3 sec hold  - Hooklying Isometric Hip Abduction with Belt  - 1 x daily - 7 x weekly - 2 sets - 10 reps - 3 sec hold  - Supine Heel Slide with Strap  - 1 x daily - 7 x weekly - 2 sets - 10 reps - 3 sec hold    Access Code: [email or web address]  URL: [email or web address]  Prepared by: Rian Rubio    Exercises  - Clamshell with Resistance  - 1 x daily - 7 x weekly - 2 sets - 10 reps  - Supine Bridge  - 1 x daily - 7 x weekly - 2 sets - 10 reps  - Hooklying Clamshell with Resistance  - 1 x daily - 7 x weekly - 2 sets - 10 reps  - Hooklying Single Leg Bent Knee Fallouts with Resistance  - 1 x daily - 7 x weekly - 2 sets - 10 reps  - Seated Hip Flexion March with Ankle Weights  - 1 x daily - 7 x weekly - 2 sets - 10 reps - 3 sec hold  - Supine Active Straight Leg Raise  - 1 x daily - 7 x weekly - 2 sets - 10 reps - 3 sec hold  - Seated Long Arc Quad  - 1 x daily - 7 x weekly - 2 sets - 10 reps - 3 sec hold  - Standing Hip Flexion March  - 1 x daily - 7 x weekly - 2 sets - 10 reps - 5 sec hold    Access Code: [email or web address]  URL: [email or web address]    Exercises  - Stride Stance Weight Shift  - 1 x daily - 7 x weekly - 2 sets - 10 reps  - Heel Raises with Counter Support  - 1 x daily - 7 x weekly - 2 sets - 10 reps      Access Code: [email or web address]  URL: [email or web address]  Date: 02/15/2024  Prepared by: Rian Rubio    Exercises  - Forward Step Up  - 1 x daily - 7 x weekly - 2 sets - 10 reps  - Lateral Step Up with Unilateral Counter Support  - 1 x daily - 7 x weekly - 2 sets - 10 reps  - Standing Hip Flexion  - 1 x daily - 7 x weekly - 2 sets - 10 reps  - Standing Hip Abduction  - 1 x daily - 7 x weekly - 2 sets - 10  reps  - Standing Hip Extension  - 1 x daily - 7 x weekly - 2 sets - 10 reps  - Stride Stance Weight Shift  - 1 x daily - 7 x weekly - 2 sets - 10 reps    Charges: 1 TE; 2 NE       Total Timed Treatment: 45 min  Total Treatment Time: 45 min

## 2024-02-22 ENCOUNTER — TELEPHONE (OUTPATIENT)
Dept: FAMILY MEDICINE CLINIC | Facility: CLINIC | Age: 64
End: 2024-02-22

## 2024-02-22 NOTE — TELEPHONE ENCOUNTER
Fax received from [facility], pt is overdue for mammogram. Spoke with pt, she stts she already scheduled it for March. Order faxed to the breast center and mailed to pt.

## 2024-02-23 ENCOUNTER — OFFICE VISIT (OUTPATIENT)
Dept: PHYSICAL THERAPY | Facility: HOSPITAL | Age: 64
End: 2024-02-23
Attending: FAMILY MEDICINE
Payer: MEDICARE

## 2024-02-23 PROCEDURE — 97110 THERAPEUTIC EXERCISES: CPT

## 2024-02-23 PROCEDURE — 97112 NEUROMUSCULAR REEDUCATION: CPT

## 2024-02-23 NOTE — PROGRESS NOTES
Discharge Summary  Pt has attended 8 visits in Physical Therapy.    Diagnosis:    Right foot pain (M79.671)  Chronic pain of left knee (M25.562,G89.29)       Referring Provider: Weiler  Date of Evaluation:    12/14/2023    Precautions:  None Next MD visit:   none scheduled  Date of Surgery: n/a        Insurance Primary/Secondary: [employer] [facility] / N/A     # Auth Visits: 14         Subjective: Patient has attended 8 skilled PT visits. She reports improvements in ability to participate in ADL's.     Pain:   Knee: 3-4/10  Foot: 0/10       Objective:   2/23/2024:  Hip   Flexion: R 5/5; L 4+/5  Extension: R 4+/5; L 4/5  Abduction: R 5/5; L 5/5  ER: R 5/5; L 5/5  IR: R 5/5; L 5/5      Post LEFS Score  Post LEFS Score: 48.75 % (2/23/2024 10:59 AM)    17.5 % improvement     SLS balance: R 9 sec and L: 7 sec    2/15/2024  Knee - AROM Knee - MMT   Flexion: R 122; L 122  Extension: R 0; L 0 Flexion: R 5/5; L 5/5  Extension: R 5/5; L 5/5       Foot/Ankle Foot/Ankle   DF - R 10 DF: R 5/5;   PF: R 5/5;  INV: R 5/5  EV: R 5/5           Assessment: Patient demonstrates improvements in ankle and knee ROM and strength since initial evaluation, meeting PT goals in these areas. She also demonstrates improvements in strength in hip, but continues to demonstrate slight deficits. Pt reports she will continue to participate in HEP to maintain strength made and progress as well. Patient does demonstrates improvement in function as well, demonstrated by 17.5% improvement on LEFS.    Goals:    (to be met in 8-12 visits)  Pt will improve knee extension ROM to 0 deg to allow proper heel strike during gait and terminal knee extension in stance MET   Pt will improve knee AROM flexion to >110 degrees to improve ability to perform stair ambulation MET   Pt will improve quad strength to 5/5 to ascend 1 flight of stairs reciprocally without UE assist Progressing (strength met,  but patient uses railing with steps)  Pt will increase hip and knee strength  to grossly 4+/5 to be able to get up and down from the floor safely Progressing (glute strength 4/5 on L, goal MET grossly through hip and knee otherwise)  Pt will demonstrate increased hip ER/ABD strength to 4+/5 to perform stepping and squatting activities without excessive femoral IR/ADD MET   Pt will improve SLS to 6s to improve safety with gait on uneven surfaces such as grass and gravel MET   Pt will be independent and compliant with comprehensive HEP to maintain progress achieved in PT  MET  Pt will demonstrate improved DF AROM to >= 10 degrees to promote proper foot clearance during gait and greater ease descending stairs without compensation MET  Pt will have increased ankle strength to 5/5 throughout for improved ankle control with ADLs such as prolonged gait and stair negotiation MET    Plan: Discharge next visit, providing extensive HEP to continue to maintain progress and strength made.     Patient/Family/Caregiver was advised of these findings, precautions, and treatment options and has agreed to actively participate in planning and for this course of care.    Thank you for your referral. If you have any questions, please contact me at Dept: [phone].    Sincerely,  Electronically signed by therapist: Rian Rubio, PT     Physician's certification required:  No  Please co-sign or sign and return this letter via fax as soon as possible to [phone].   I certify the need for these services furnished under this plan of treatment and while under my care.    X___________________________________________________ Date____________________    Certification From: 2/23/2024  To:5/23/2024    Date: 12/26/2023  TX#: 2/8-12 Date:12/29/2023               TX#: 3/-12 Date: 1/4/2024               TX#: 4/8-12 Date: 1/10/2024             TX#: 5/8-12 Date: 1/26/2024  Tx#: 6/8-12 Date: 2/15/2024  Tx #: 7/8-12  FL Date: 2/23/2024  Tx #: 8/8-12   TE: 15 min  Nu-Step: 5 min lvl 1  Shuttle Press #75 DL x 20   Shuttle Press  calf raise 50# x 20  H/L Hip Abd 3 sec x 10 (with gait belt)  TE: 20 min  Nu-Step: 5 min lvl 1  Shuttle Press #87 DL x 20   Shuttle Press calf raise 50# x 20  Shuttle Press 50# SL x 20 R   Shuttle press 50# SL x 10 L  Shuttle press 25# SL x 10 L  Clamshells YTB x 15 R/L    TE: 20 min  Shuttle Press #100 DL x 20   Shuttle Press calf raise 62# x 20  Shuttle Press 62# SL x 20 R /L  Hamstring curls RTB x 15 (L)  SLR x 15 (L)  Heel raises x 10 BLE TE: 25 min  Nu-Step: 5 min level 5  Shuttle Press #100 DL x 20   Shuttle Press calf raise 50# x 20  Shuttle Press 50# SL x 20 R /L  Bridges x 20    TE: 20 min  LAQ 3 sec hold x 10 L  Shuttle Press #100 DL x 20   Shuttle Press #62 SL x R (attempted on L unable)  Shuttle Press calf raise 50# x 20  Shuttle Press 50# SL x 20 L   TE: 20 min  Glute bridging w/ add squeeze 2 x 10   SB hamstring curl x 20   Shuttle Press #105 DL x 20   Shuttle Press calf raise 55# x 20  Shuttle Press 55# SL x 20 L TE: 25 min  Shuttle Press #105 DL x 20   Shuttle Press calf raise 55# x 20  Shuttle Press 55# SL x 20 L  Shuttle press hip abduction 25# x 20 R/L   MT: 15 min  TC Grade 3 mobs - PA/AP  MWM into DF   Knee joint mobs grade 3 - extension MT: 10 min  Knee joint mobs grade 2-3 into knee extension and flexion MT: 10 min  MWM DF   TC Joint mobs grade 3     MT: 10 min  Inferior and lateral joint mobs - L hip w/ belt     NE: 15 min  Quad Set 5 sec x 10   Heel Slides 5 sec x 5 R/L   SAQ (attempted)  LAQ (attempted)  Adduction ball squeeze in H/L 3 sec hold x 10  NE: 15 min  H/L clamshells YTB x 15  BKFO x 10 R/L  Stair ambulation trials 4 inch (3 steps) x 5   TA: 15 min  Step up and over 4' x 10 R  Step up and over 2' x 10 L  Stride/Stance WS R x 10   Side/Stance WS L (unable)  Eccentric step downs 2' R x 10   Eccentric step downs 2' (unable)       NE: 15 min  SLR x 15 L  Seated hip flexion 3 sec hold x 10 L   Standing hip flexion 3 sec hold x 10 L  Standing hip flexion 2 x 30 sec L NE: 25 min  FWD and  LAT Step up's 4' x 10 R LE leading  FWD and LAT Step up's x 20 L LE leading  Standing hip flexion YTB 3 sec hold 2 x 10 R/L  Standing hip abduction YTB 2 x 10 R/L  Eccentric FWD heel taps 4' x 10 R  Weight shifts with manual cue x 10 L  Eccentric FWD heel taps 2' x 10 L (altered due to difficulty putting weight on L knee in flexion)    NE: 20 min  FWD and LAT Step up's 4' x 20 L LE leading and x 10 R LE leading  Reassessment  Bridges w/ add. Squeeze x 20  SLS w/ slider - flex, abd, extension x 10 R/L      NE: 20 min  TRX mini squats x 20  Tandem balance 2 x 30 sec  Heel to toe walking 5 ft x 4  AD education: height where it should be, how to alter      HEP:   Access Code: [email or web address]  URL: [email or web address]  Date: 12/14/2023  Prepared by: Rian Rubio     Exercises  - Supine Quad Set  - 1 x daily - 7 x weekly - 2 sets - 10 reps - 3-5 sec hold  - Supine Short Arc Quad  - 1 x daily - 7 x weekly - 2 sets - 10 reps  - Long Sitting Ankle Dorsiflexion with Anchored Resistance  - 1 x daily - 7 x weekly - 2 sets - 10 reps  - Long Sitting Ankle Plantar Flexion with Resistance  - 1 x daily - 7 x weekly - 2 sets - 10 reps  - Long Sitting Ankle Eversion with Resistance  - 1 x daily - 7 x weekly - 2 sets - 10 reps  - Long Sitting Ankle Inversion with Resistance  - 1 x daily - 7 x weekly - 2 sets - 10 reps    Access Code: [email or web address]  URL: [email or web address]  Date: 12/26/2023  Prepared by: Rian Rubio    Exercises  - Supine Hip Adduction Isometric with Ball  - 1 x daily - 7 x weekly - 2 sets - 10 reps - 3 sec hold  - Hooklying Isometric Hip Abduction with Belt  - 1 x daily - 7 x weekly - 2 sets - 10 reps - 3 sec hold  - Supine Heel Slide with Strap  - 1 x daily - 7 x weekly - 2 sets - 10 reps - 3 sec hold    Access Code: [email or web address]  URL: [email or web address]  Prepared by: Rian Rubio    Exercises  - Clamshell with Resistance  - 1 x daily - 7 x weekly - 2 sets - 10 reps  - Supine Bridge  - 1 x daily - 7 x  weekly - 2 sets - 10 reps  - Hooklying Clamshell with Resistance  - 1 x daily - 7 x weekly - 2 sets - 10 reps  - Hooklying Single Leg Bent Knee Fallouts with Resistance  - 1 x daily - 7 x weekly - 2 sets - 10 reps  - Seated Hip Flexion March with Ankle Weights  - 1 x daily - 7 x weekly - 2 sets - 10 reps - 3 sec hold  - Supine Active Straight Leg Raise  - 1 x daily - 7 x weekly - 2 sets - 10 reps - 3 sec hold  - Seated Long Arc Quad  - 1 x daily - 7 x weekly - 2 sets - 10 reps - 3 sec hold  - Standing Hip Flexion March  - 1 x daily - 7 x weekly - 2 sets - 10 reps - 5 sec hold    Access Code: [email or web address]  URL: [email or web address]    Exercises  - Stride Stance Weight Shift  - 1 x daily - 7 x weekly - 2 sets - 10 reps  - Heel Raises with Counter Support  - 1 x daily - 7 x weekly - 2 sets - 10 reps      Access Code: [email or web address]  URL: [email or web address]  Date: 02/15/2024  Prepared by: Rian Rubio    Exercises  - Forward Step Up  - 1 x daily - 7 x weekly - 2 sets - 10 reps  - Lateral Step Up with Unilateral Counter Support  - 1 x daily - 7 x weekly - 2 sets - 10 reps  - Standing Hip Flexion  - 1 x daily - 7 x weekly - 2 sets - 10 reps  - Standing Hip Abduction  - 1 x daily - 7 x weekly - 2 sets - 10 reps  - Standing Hip Extension  - 1 x daily - 7 x weekly - 2 sets - 10 reps  - Stride Stance Weight Shift  - 1 x daily - 7 x weekly - 2 sets - 10 reps  - Standing Hip Flexion/Abduction/Extension on Slider  - 1 x daily - 7 x weekly - 2 sets - 10 reps    Charges: 2 TE; 1 NE       Total Timed Treatment: 45 min  Total Treatment Time: 45 min

## 2024-03-12 ENCOUNTER — TELEPHONE (OUTPATIENT)
Dept: FAMILY MEDICINE CLINIC | Facility: CLINIC | Age: 64
End: 2024-03-12

## 2024-03-12 NOTE — TELEPHONE ENCOUNTER
Mammogram report received from [facility]; placed on Dr Weiler's desk for review. Chart updated.

## 2024-04-03 ENCOUNTER — OFFICE VISIT (OUTPATIENT)
Dept: FAMILY MEDICINE CLINIC | Facility: CLINIC | Age: 64
End: 2024-04-03
Payer: MEDICARE

## 2024-04-03 VITALS
WEIGHT: 261 LBS | TEMPERATURE: 98 F | SYSTOLIC BLOOD PRESSURE: 141 MMHG | HEART RATE: 85 BPM | BODY MASS INDEX: 43 KG/M2 | RESPIRATION RATE: 18 BRPM | DIASTOLIC BLOOD PRESSURE: 89 MMHG

## 2024-04-03 DIAGNOSIS — M25.562 CHRONIC PAIN OF LEFT KNEE: Primary | ICD-10-CM

## 2024-04-03 DIAGNOSIS — R05.3 CHRONIC COUGH: ICD-10-CM

## 2024-04-03 DIAGNOSIS — E28.39 ESTROGEN DEFICIENCY: ICD-10-CM

## 2024-04-03 DIAGNOSIS — R20.0 ARM NUMBNESS: ICD-10-CM

## 2024-04-03 DIAGNOSIS — K21.9 GASTROESOPHAGEAL REFLUX DISEASE WITHOUT ESOPHAGITIS: ICD-10-CM

## 2024-04-03 DIAGNOSIS — E66.01 CLASS 3 SEVERE OBESITY WITHOUT SERIOUS COMORBIDITY WITH BODY MASS INDEX (BMI) OF 40.0 TO 44.9 IN ADULT, UNSPECIFIED OBESITY TYPE (HCC): ICD-10-CM

## 2024-04-03 DIAGNOSIS — G89.29 CHRONIC PAIN OF LEFT KNEE: Primary | ICD-10-CM

## 2024-04-03 DIAGNOSIS — R73.02 IMPAIRED GLUCOSE TOLERANCE: ICD-10-CM

## 2024-04-03 DIAGNOSIS — Z12.11 SCREENING FOR COLON CANCER: ICD-10-CM

## 2024-04-03 PROCEDURE — G2211 COMPLEX E/M VISIT ADD ON: HCPCS | Performed by: FAMILY MEDICINE

## 2024-04-03 PROCEDURE — 3079F DIAST BP 80-89 MM HG: CPT | Performed by: FAMILY MEDICINE

## 2024-04-03 PROCEDURE — 3077F SYST BP >= 140 MM HG: CPT | Performed by: FAMILY MEDICINE

## 2024-04-03 PROCEDURE — 99214 OFFICE O/P EST MOD 30 MIN: CPT | Performed by: FAMILY MEDICINE

## 2024-04-03 RX ORDER — MULTIVIT-MIN/IRON/FOLIC ACID/K 18-600-40
CAPSULE ORAL
COMMUNITY

## 2024-04-03 NOTE — PROGRESS NOTES
HPI: Mckenna is a 64 year old female who presents for multiple concerns.     Pt has chronic left leg pain. Has moderate OA and known patella tendon tear.  Saw Orthopedics in August 2022.  Had cortisone injection which did not seem to help.  Pt did PT last year for left knee and right ankle.  Feels like it helped if she does it daily. Pain is worse at night now. Has to take 2-4 Ibuprofen to sleep at night. Feels like pain comes from the knee. Gets pain in left hip and left gluteal area as well.     Has had cough consistently since last year.  Does not feel congestion or post nasal drainage. She does have chronic heartburn and takes TUMS. Has to avoid red sauces. She also feels like baking soda and water help.     Seeing neurologist for bilateral arm numbness.  Needs to get MRI cervical spine.     Interested in Wegov[facility]. She states she has tried everything for weight loss. Limited exercise due to care of grandchildren and .     PMH:    Past Medical History:   Diagnosis Date    Gigantomastia     Lipoma     Pneumonia due to organism 2018    Screen for colon cancer 2019    repeat CLN in 5 yrs due to hx of polyps       Alg:  Patient has no known allergies.   Meds:   Current Outpatient Medications on File Prior to Visit   Medication Sig Dispense Refill    Cholecalciferol (VITAMIN D) 50 MCG (2000 UT) Oral Cap Take by mouth.      Cyanocobalamin (B-12 OR) Take by mouth.      TURMERIC OR Take by mouth.      ZINC OR Use as directed in the mouth or throat.      Vitamin C (VITAMIN C) 500 MG Oral Chew Tab Chew  by mouth.      Multiple Vitamin (MULTIVITAMINS) Oral Cap Take  by mouth.      ibuprofen 200 MG Oral Tab Take 1 tablet (200 mg total) by mouth every 6 (six) hours as needed for Pain.       No current facility-administered medications on file prior to visit.      Tobacco Use: no    Objective:   Gen: AOx3. NAD.   /89   Pulse 85   Temp 97.9 °F (36.6 °C) (Temporal)   Resp 18   Wt 261 lb (118.4 kg)   BMI 43.43  kg/m²   CV:  Regular rate and rhythm; no murmurs  Lungs:  Clear to ausculation; good aeration               No wheezes, rales or rhonchi      Assessment:/Plan:  Encounter Diagnoses   Name Primary?    Chronic pain of left knee    Consistent pain.  Will get xray of the left knee.  Referral to Orthopedics done.    Yes    Chronic cough    May be due to chronic reflux. Advised to decrease daily use of Ibuprofen.  Try Tylenol arthritis for pain.  Start Pepcid BID to see if it helps.       Gastroesophageal reflux disease without esophagitis    See plan above       Arm numbness    Will be getting MRI cervical spine.  Follows with Neurology.       Impaired glucose tolerance    Encouraged healthy diet.        Class 3 severe obesity without serious comorbidity with body mass index (BMI) of 40.0 to 44.9 in adult, unspecified obesity type (HCC)    Pt has interest in new medications.  Will refer to bariatric clinic.       Screening for colon cancer    Refer to GI       Estrogen deficiency    Check DEXA scan      Colleen Weiler, DO

## 2024-04-23 ENCOUNTER — HOSPITAL ENCOUNTER (OUTPATIENT)
Dept: GENERAL RADIOLOGY | Facility: HOSPITAL | Age: 64
Discharge: HOME | End: 2024-04-23
Attending: FAMILY MEDICINE
Payer: MEDICARE

## 2024-04-23 ENCOUNTER — HOSPITAL ENCOUNTER (OUTPATIENT)
Dept: GENERAL RADIOLOGY | Age: 64
Discharge: HOME | End: 2024-04-23
Attending: FAMILY MEDICINE
Payer: MEDICARE

## 2024-04-23 DIAGNOSIS — M25.562 CHRONIC PAIN OF LEFT KNEE: ICD-10-CM

## 2024-04-23 DIAGNOSIS — G89.29 CHRONIC PAIN OF LEFT KNEE: ICD-10-CM

## 2024-04-23 PROCEDURE — 73562 X-RAY EXAM OF KNEE 3: CPT | Performed by: FAMILY MEDICINE

## 2024-05-03 ENCOUNTER — TELEPHONE (OUTPATIENT)
Dept: ORTHOPEDICS CLINIC | Facility: CLINIC | Age: 64
End: 2024-05-03

## 2024-05-03 DIAGNOSIS — Z01.89 ENCOUNTER FOR LOWER EXTREMITY COMPARISON IMAGING STUDY: ICD-10-CM

## 2024-05-03 DIAGNOSIS — M25.562 LEFT KNEE PAIN, UNSPECIFIED CHRONICITY: Primary | ICD-10-CM

## 2024-05-03 NOTE — TELEPHONE ENCOUNTER
Weightbearing xrays needed per protocol.     X-Ray ordered. Please schedule with the patient. Thank you

## 2024-05-03 NOTE — TELEPHONE ENCOUNTER
Patient called for left knee pain. Please advise if additional xrays needed   Future Appointments   Date Time Provider Department Center   5/13/2024  2:40 PM Ashley Stein MD [facility]   8/15/2024 10:30 AM Weiler, Colleen M, DO [facility]   8/21/2024  4:00 PM Rosalie Mathis APRN [id number]   9/16/2024 10:00 AM GI COLON SCREENING [id number] None

## 2024-05-10 ENCOUNTER — TELEPHONE (OUTPATIENT)
Dept: ORTHOPEDICS CLINIC | Facility: CLINIC | Age: 64
End: 2024-05-10

## 2024-05-10 NOTE — TELEPHONE ENCOUNTER
XR ordered per ortho protocol. XR scheduled and patient was notified via [employer] to let them know that they should arrive 15-20 minutes early, in order for them to complete imaging.

## 2024-05-13 ENCOUNTER — OFFICE VISIT (OUTPATIENT)
Dept: ORTHOPEDICS CLINIC | Facility: CLINIC | Age: 64
End: 2024-05-13
Payer: COMMERCIAL

## 2024-05-13 ENCOUNTER — HOSPITAL ENCOUNTER (OUTPATIENT)
Dept: GENERAL RADIOLOGY | Age: 64
Discharge: HOME | End: 2024-05-13
Attending: ORTHOPAEDIC SURGERY

## 2024-05-13 VITALS — BODY MASS INDEX: 43.49 KG/M2 | WEIGHT: 261 LBS | HEIGHT: 65 IN

## 2024-05-13 DIAGNOSIS — M17.12 PRIMARY OSTEOARTHRITIS OF LEFT KNEE: Primary | ICD-10-CM

## 2024-05-13 DIAGNOSIS — Z01.89 ENCOUNTER FOR LOWER EXTREMITY COMPARISON IMAGING STUDY: ICD-10-CM

## 2024-05-13 DIAGNOSIS — M25.562 LEFT KNEE PAIN, UNSPECIFIED CHRONICITY: ICD-10-CM

## 2024-05-13 PROCEDURE — 99204 OFFICE O/P NEW MOD 45 MIN: CPT | Performed by: ORTHOPAEDIC SURGERY

## 2024-05-13 PROCEDURE — 73562 X-RAY EXAM OF KNEE 3: CPT | Performed by: ORTHOPAEDIC SURGERY

## 2024-05-13 NOTE — PROGRESS NOTES
[facility] New Consult    Chief Complaint   Patient presents with    Knee Pain     LT KNEE PAIN;ONSET: 2019  -some numbness on LT knee  -LT knee sx 2019     HPI: The patient is a 64 year old female referred for orthopaedic consultation by Dr. Colleen Weiler with complaints of chronic left knee pain.  Relates a long history of chronic knee pain but substantial worsening after a traumatic patellar tendon rupture on this left side.  This was repaired in 2019 by an outside surgeon after which she returned to ambulation but now depends on a cane.  Pain is localized anteriorly and occasionally radiates posteriorly.  Her walking tolerance is diminished resulting in an abnormal gait pattern.    Past Medical History:    Gigantomastia    Lipoma    Pneumonia due to organism    Screen for colon cancer    repeat CLN in 5 yrs due to hx of polyps      Past Surgical History:   Procedure Laterality Date    Ankle fracture surgery Right     Arthroplasty patella Left 2019    Colonoscopy N/A 09/04/2019    Procedure: COLONOSCOPY;  Surgeon: OPHELIA Colbert MD;  Location: [facility] ENDOSCOPY    Other surgical history      left bunion surgery    Reduction of large breast  2008    Breast reduction to reduce back pain     Current Outpatient Medications   Medication Sig Dispense Refill    Cholecalciferol (VITAMIN D) 50 MCG (2000 UT) Oral Cap Take by mouth.      Cyanocobalamin (B-12 OR) Take by mouth.      ibuprofen 200 MG Oral Tab Take 1 tablet (200 mg total) by mouth every 6 (six) hours as needed for Pain.      TURMERIC OR Take by mouth.      ZINC OR Use as directed in the mouth or throat.      Vitamin C (VITAMIN C) 500 MG Oral Chew Tab Chew  by mouth.      Multiple Vitamin (MULTIVITAMINS) Oral Cap Take  by mouth.       No Known Allergies  Family History   Problem Relation Age of Onset    Hypertension Father     Cancer Father         liver    Hypertension Sister     Diabetes Sister     Other (Other) Mother         dementia     Social  History     Occupational History    Not on file   Tobacco Use    Smoking status: Former     Current packs/day: 0.00     Types: Cigarettes     Quit date:      Years since quittin.3    Smokeless tobacco: Never   Vaping Use    Vaping status: Never Used   Substance and Sexual Activity    Alcohol use: Yes     Comment: Wine - occasionally    Drug use: No    Sexual activity: Not on file        ROS:  Complete ROS reviewed by me and non-contributory to the chief complaint except as mentioned above.    Physical Exam:    Ht 5' 5\" (1.651 m)   Wt 261 lb (118.4 kg)   BMI 43.43 kg/m²   Constitutional: Well developed, well nourished 64 year old female  Psychological: NAD, alert and appropriate  Respiratory: Breathing comfortably on room air with RR of 10-14  Cardiac: Palpable distal pulses with pink warm extremities distally  Left knee: Inspection reveals a well-healed midline scar, no significant discoloration or deformity.  Palpation reveals no significant warmth or effusion.  Range of motion is adequate with trace extensor lag and adequate flexion to at least 110 degrees.  Medial joint line is tender to palpation.  Collaterals are stable on varus valgus stress.  Lachman and posterior drawer are negative.  Popliteal space is nontender.  No significant distal edema is noted.  Neurovascular status is intact on sensory, motor and perfusion assessment distally.    Imaging:  XR KNEE (3 VIEWS), RIGHT (CPT=73562)    Result Date: 2024  PROCEDURE: XR KNEE ROUTINE (3 VIEWS), RIGHT (CPT=73562)  COMPARISON: [facility] in [place], XR KNEE (1 OR 2 VIEWS), RIGHT (CPT=73560), 2018, 7:26 PM.  [facility] in [place], XR KNEE (3 VIEWS), LEFT (CPT=73562), 2022, 1:52 PM.  [facility] in [place],  XR KNEE (3 VIEWS), LEFT (CPT=73562), 2024, 2:22 PM.  INDICATIONS: Encounter for lower extremity comparison imaging study  TECHNIQUE: Three-view Findings and impression:  Normal  alignment.  No acute fracture.  Minimal narrowing in the medial compartment.  Small tricompartmental spurs  6 x 3 centimeter well-circumscribed heterotopic bone along the lateral margin of the patella previously 5.5 x 2.0 centimeter.  Stable small chronic fragment near the lateral margin of the tibial plateau measuring 1.0 x 0.9 centimeter.  This is likely heterotopic bone from prior trauma     Dictated by (CST): Arsen Ramirez MD on 5/13/2024 at 2:38 PM     Finalized by (CST): Arsen Ramirez MD on 5/13/2024 at 2:41 PM          XR KNEE (3 VIEWS), LEFT (CPT=73562)    Result Date: 4/24/2024  CONCLUSION:   Progressed tricompartmental left knee osteoarthritis most significant in the medial compartment.  Patella Mae redemonstrated.    Dictated by (CST): Jean Carlos Delacruz MD on 4/24/2024 at 7:40 AM     Finalized by (CST): Jean Carlos Delacruz MD on 4/24/2024 at 7:42 AM           Multiple views left knee personally reviewed, independently interpreted and radiology report read.  Significant degenerative joint disease with subluxation is noted.  Patella mae is also seen, most likely related to her patellar tendon rupture and repair.      Assessment/Diagnoses:    Diagnoses and all orders for this visit:    Primary osteoarthritis of left knee      Plan:  I reviewed imaging and exam findings with the patient.  The diagnosis is degenerative joint disease of the left knee.  She suffered a patellar tendon rupture in 2019 this is not likely a contributing factor to her severe tibiofemoral degenerative joint disease.  We discussed the etiology, natural history and treatment options in detail.  Treatments include activity modification, weight loss, anti-inflammatory use and possible injections.  In the long term, the patient's symptoms may progress and warrant consideration of total knee arthroplasty, but only if symptoms become severe and she is able to optimize her BMI in preparation for a fairly significant operation.  For now,  non-surgical treatment options are recommended.  We discussed the option for knee corticosteroid injection along with the potential risks, benefits and alternatives.  In light of progressive symptoms, the patient initially elected to proceed but changed her mind.  For now, she would like to continue with conservative measures.  She recalls the cortisone injection being painful in the past and will declined for now.  All questions were answered and she verbalized understanding and appreciation.  Follow-up with us if symptoms progress.      Ashley Stein MD, [facility]  Orthopaedic Surgery   Sports Medicine/Knee and Shoulder  [facility]  t: [phone]  f: [phone]               This document was partially prepared using Dragon Medical voice recognition software.  Although every attempt is made to correct errors during dictation, discrepancies may still exist.

## 2024-07-22 ENCOUNTER — HOSPITAL ENCOUNTER (OUTPATIENT)
Age: 64
Discharge: HOME | End: 2024-07-22
Payer: MEDICARE

## 2024-07-22 ENCOUNTER — APPOINTMENT (OUTPATIENT)
Dept: GENERAL RADIOLOGY | Age: 64
End: 2024-07-22
Attending: NURSE PRACTITIONER
Payer: MEDICARE

## 2024-07-22 VITALS
OXYGEN SATURATION: 97 % | HEART RATE: 80 BPM | TEMPERATURE: 99 F | SYSTOLIC BLOOD PRESSURE: 137 MMHG | RESPIRATION RATE: 20 BRPM | DIASTOLIC BLOOD PRESSURE: 63 MMHG

## 2024-07-22 DIAGNOSIS — M25.522 LEFT ELBOW PAIN: ICD-10-CM

## 2024-07-22 DIAGNOSIS — M70.32 BURSITIS OF LEFT ELBOW, UNSPECIFIED BURSA: Primary | ICD-10-CM

## 2024-07-22 PROCEDURE — 99203 OFFICE O/P NEW LOW 30 MIN: CPT | Performed by: NURSE PRACTITIONER

## 2024-07-22 PROCEDURE — 73080 X-RAY EXAM OF ELBOW: CPT | Performed by: NURSE PRACTITIONER

## 2024-07-22 RX ORDER — NAPROXEN 500 MG/1
500 TABLET ORAL 2 TIMES DAILY PRN
Qty: 20 TABLET | Refills: 0 | Status: SHIPPED | OUTPATIENT
Start: 2024-07-22 | End: 2024-08-01

## 2024-07-22 RX ORDER — CEPHALEXIN 500 MG/1
500 CAPSULE ORAL 3 TIMES DAILY
Qty: 21 CAPSULE | Refills: 0 | Status: SHIPPED | OUTPATIENT
Start: 2024-07-22 | End: 2024-07-29

## 2024-07-22 NOTE — ED PROVIDER NOTES
Patient Seen in: [facility]      History   No chief complaint on file.    Stated Complaint: L Elbow Pain    Subjective:   HPI  Patient is a 64-year-old female who presents to the immediate care center with concern for pain to the left elbow.  This is been persistent for approximately 3 weeks.  It is getting worse over the last couple of days and she is concerned because today the skin overlying the elbow started to blister.  She denies precipitating injury; has had no motor or sensory deficit; denies systemic symptoms.          Objective:   Past Medical History:    Gigantomastia    Lipoma    Pneumonia due to organism    Screen for colon cancer    repeat CLN in 5 yrs due to hx of polyps               Past Surgical History:   Procedure Laterality Date    Ankle fracture surgery Right     Arthroplasty patella Left 2019    Colonoscopy N/A 2019    Procedure: COLONOSCOPY;  Surgeon: OPHELIA Colbert MD;  Location: [facility] ENDOSCOPY    Other surgical history      left bunion surgery    Reduction of large breast      Breast reduction to reduce back pain                Social History     Socioeconomic History    Marital status: Single   Tobacco Use    Smoking status: Former     Current packs/day: 0.00     Types: Cigarettes     Quit date:      Years since quittin.5    Smokeless tobacco: Never   Vaping Use    Vaping status: Never Used   Substance and Sexual Activity    Alcohol use: Yes     Comment: Wine - occasionally    Drug use: No   Other Topics Concern    Caffeine Concern Yes     Comment: Coffee, Occasional, 1 cup daily    Exercise No    Reaction to local anesthetic No   Social History Narrative    The patient uses the following assistive device(s):  single-point cane.      The patient does live in a home with stairs.              Review of Systems   Constitutional:  Negative for appetite change, chills and fever.   Musculoskeletal:  Positive for arthralgias.   Neurological:  Negative for  weakness and numbness.       Positive for stated Chief Complaint: No chief complaint on file.    Other systems are as noted in HPI.  Constitutional and vital signs reviewed.      All other systems reviewed and negative except as noted above.    Physical Exam     ED Triage Vitals [07/22/24 1451]   /63   Pulse 80   Resp 20   Temp 98.8 °F (37.1 °C)   Temp src Temporal   SpO2 97 %   O2 Device None (Room air)       Current Vitals:   Vital Signs  BP: 137/63  Pulse: 80  Resp: 20  Temp: 98.8 °F (37.1 °C)  Temp src: Temporal    Oxygen Therapy  SpO2: 97 %  O2 Device: None (Room air)            Physical Exam  Vitals and nursing note reviewed.   Constitutional:       General: She is not in acute distress.     Appearance: She is not ill-appearing.   Cardiovascular:      Pulses: Normal pulses.   Pulmonary:      Effort: Pulmonary effort is normal. No respiratory distress.   Musculoskeletal:      Left upper arm: Normal.      Left elbow: Swelling and effusion present. Normal range of motion. Tenderness present.      Left forearm: Normal.        Arms:    Skin:     General: Skin is warm and dry.   Neurological:      Mental Status: She is alert and oriented to person, place, and time.   Psychiatric:         Behavior: Behavior normal.               ED Course   Labs Reviewed - No data to display       XR ELBOW, COMPLETE (MIN 3 VIEWS), LEFT (CPT=73080)   Final Result   PROCEDURE: XR ELBOW, COMPLETE (MIN 3 VIEWS), LEFT (CPT=73080)       COMPARISON: None.       INDICATIONS: Left posterior elbow pain x 1 month. No known injury.       TECHNIQUE: 4 views were obtained.         FINDINGS:    BONES: No acute appearing fracture or dislocation.  Mild bony    proliferative change at the distal lateral and medial epicondylar regions.   SOFT TISSUES: Moderate soft tissue swelling overlying the olecranon may    suggest olecranon bursitis versus focal inflammatory reaction.  Correlate    clinically.   EFFUSION: None visible.    OTHER: Negative.                     =====   CONCLUSION:    1. No acute appearing fracture or dislocation.  Mild bony proliferative    change at the distal lateral and medial epicondylar regions of the distal    humerus.   2. Moderate soft tissue swelling overlying the olecranon may suggest    olecranon bursitis versus focal inflammatory reaction.  Correlate    clinically.               Dictated by (CST): Mynor Jones MD on 7/22/2024 at 3:51 PM        Finalized by (CST): Mynor Joens MD on 7/22/2024 at 3:52 PM                               MDM      Patient was advised that she is likely experiencing olecranon bursitis.  However, because there is in the skin with a performed today, we will cover her also for potential cellulitis.  Differential diagnoses were discussed with patient prior to disposition: These include potential for septic arthritis.  She was reassured that because she has good range of motion, this is less likely today.  However, she was informed she must have good follow-up with her primary care provider next week and if it anytime her symptoms worsen: She have worsening pain, increased redness or swelling, decreased range of motion, she must have prompt reevaluation in the emergency department.  She states understanding agrees with plan.                                 Medical Decision Making  Differential diagnoses considered today include, but are not exclusive of: Olecranon bursitis; cellulitis; septic arthritis; fracture, dislocation, strain, sprain, vascular compromise, and nerve impingement syndrome.      Problems Addressed:  Bursitis of left elbow, unspecified bursa: self-limited or minor problem    Amount and/or Complexity of Data Reviewed  Radiology:  Decision-making details documented in ED Course.    Risk  OTC drugs.  Prescription drug management.        Disposition and Plan     Clinical Impression:  1. Bursitis of left elbow, unspecified bursa    2. Left elbow pain          Disposition:  Discharge  7/22/2024  4:11 pm    Follow-up:  Weiler, Colleen M, DO  [address] [address]  [phone]    Schedule an appointment as soon as possible for a visit in 1 week  As needed          Medications Prescribed:  Current Discharge Medication List        START taking these medications    Details   naproxen 500 MG Oral Tab Take 1 tablet (500 mg total) by mouth 2 (two) times daily as needed.  Qty: 20 tablet, Refills: 0      cephalexin 500 MG Oral Cap Take 1 capsule (500 mg total) by mouth 3 (three) times daily for 7 days.  Qty: 21 capsule, Refills: 0

## 2024-08-15 ENCOUNTER — OFFICE VISIT (OUTPATIENT)
Dept: FAMILY MEDICINE CLINIC | Facility: CLINIC | Age: 64
End: 2024-08-15
Payer: MEDICARE

## 2024-08-15 ENCOUNTER — LAB ENCOUNTER (OUTPATIENT)
Dept: LAB | Age: 64
End: 2024-08-15
Attending: FAMILY MEDICINE
Payer: MEDICARE

## 2024-08-15 VITALS
BODY MASS INDEX: 43.15 KG/M2 | HEIGHT: 65 IN | SYSTOLIC BLOOD PRESSURE: 133 MMHG | HEART RATE: 97 BPM | RESPIRATION RATE: 18 BRPM | OXYGEN SATURATION: 94 % | WEIGHT: 259 LBS | DIASTOLIC BLOOD PRESSURE: 86 MMHG | TEMPERATURE: 97 F

## 2024-08-15 DIAGNOSIS — Z00.00 ENCOUNTER FOR SUBSEQUENT ANNUAL WELLNESS VISIT (AWV) IN MEDICARE PATIENT: ICD-10-CM

## 2024-08-15 DIAGNOSIS — K57.30 DIVERTICULOSIS OF COLON: ICD-10-CM

## 2024-08-15 DIAGNOSIS — R73.03 PREDIABETES: ICD-10-CM

## 2024-08-15 DIAGNOSIS — E66.01 MORBID OBESITY WITH BMI OF 45.0-49.9, ADULT (HCC): ICD-10-CM

## 2024-08-15 DIAGNOSIS — G47.33 MODERATE OBSTRUCTIVE SLEEP APNEA: ICD-10-CM

## 2024-08-15 DIAGNOSIS — R73.02 IMPAIRED GLUCOSE TOLERANCE: ICD-10-CM

## 2024-08-15 DIAGNOSIS — Z00.00 ENCOUNTER FOR MEDICARE ANNUAL WELLNESS EXAM: ICD-10-CM

## 2024-08-15 DIAGNOSIS — Z00.00 ENCOUNTER FOR SUBSEQUENT ANNUAL WELLNESS VISIT (AWV) IN MEDICARE PATIENT: Primary | ICD-10-CM

## 2024-08-15 DIAGNOSIS — R05.1 ACUTE COUGH: ICD-10-CM

## 2024-08-15 DIAGNOSIS — Z12.31 ENCOUNTER FOR SCREENING MAMMOGRAM FOR BREAST CANCER: ICD-10-CM

## 2024-08-15 LAB
ALBUMIN SERPL-MCNC: 4.3 G/DL (ref 3.2–4.8)
ALBUMIN/GLOB SERPL: 1.2 {RATIO} (ref 1–2)
ALP LIVER SERPL-CCNC: 65 U/L
ALT SERPL-CCNC: 17 U/L
ANION GAP SERPL CALC-SCNC: 5 MMOL/L (ref 0–18)
AST SERPL-CCNC: 21 U/L (ref ?–34)
BILIRUB SERPL-MCNC: 0.3 MG/DL (ref 0.2–1.1)
BUN BLD-MCNC: 20 MG/DL (ref 9–23)
BUN/CREAT SERPL: 21.3 (ref 10–20)
CALCIUM BLD-MCNC: 9.4 MG/DL (ref 8.7–10.4)
CHLORIDE SERPL-SCNC: 111 MMOL/L (ref 98–112)
CHOLEST SERPL-MCNC: 156 MG/DL (ref ?–200)
CO2 SERPL-SCNC: 29 MMOL/L (ref 21–32)
CREAT BLD-MCNC: 0.94 MG/DL
DEPRECATED RDW RBC AUTO: 47 FL (ref 35.1–46.3)
EGFRCR SERPLBLD CKD-EPI 2021: 67 ML/MIN/1.73M2 (ref 60–?)
ERYTHROCYTE [DISTWIDTH] IN BLOOD BY AUTOMATED COUNT: 14.8 % (ref 11–15)
EST. AVERAGE GLUCOSE BLD GHB EST-MCNC: 128 MG/DL (ref 68–126)
FASTING PATIENT LIPID ANSWER: NO
FASTING STATUS PATIENT QL REPORTED: NO
GLOBULIN PLAS-MCNC: 3.5 G/DL (ref 2–3.5)
GLUCOSE BLD-MCNC: 93 MG/DL (ref 70–99)
HBA1C MFR BLD: 6.1 % (ref ?–5.7)
HCT VFR BLD AUTO: 39 %
HDLC SERPL-MCNC: 56 MG/DL (ref 40–59)
HGB BLD-MCNC: 12.5 G/DL
LDLC SERPL CALC-MCNC: 90 MG/DL (ref ?–100)
MCH RBC QN AUTO: 27.7 PG (ref 26–34)
MCHC RBC AUTO-ENTMCNC: 32.1 G/DL (ref 31–37)
MCV RBC AUTO: 86.3 FL
NONHDLC SERPL-MCNC: 100 MG/DL (ref ?–130)
OSMOLALITY SERPL CALC.SUM OF ELEC: 302 MOSM/KG (ref 275–295)
PLATELET # BLD AUTO: 386 10(3)UL (ref 150–450)
POTASSIUM SERPL-SCNC: 4.1 MMOL/L (ref 3.5–5.1)
PROT SERPL-MCNC: 7.8 G/DL (ref 5.7–8.2)
RBC # BLD AUTO: 4.52 X10(6)UL
SODIUM SERPL-SCNC: 145 MMOL/L (ref 136–145)
TRIGL SERPL-MCNC: 44 MG/DL (ref 30–149)
TSI SER-ACNC: 1.18 MIU/ML (ref 0.55–4.78)
VLDLC SERPL CALC-MCNC: 7 MG/DL (ref 0–30)
WBC # BLD AUTO: 6 X10(3) UL (ref 4–11)

## 2024-08-15 PROCEDURE — 80053 COMPREHEN METABOLIC PANEL: CPT

## 2024-08-15 PROCEDURE — 80061 LIPID PANEL: CPT

## 2024-08-15 PROCEDURE — 36415 COLL VENOUS BLD VENIPUNCTURE: CPT

## 2024-08-15 PROCEDURE — 84443 ASSAY THYROID STIM HORMONE: CPT

## 2024-08-15 PROCEDURE — 85027 COMPLETE CBC AUTOMATED: CPT

## 2024-08-15 PROCEDURE — 83036 HEMOGLOBIN GLYCOSYLATED A1C: CPT

## 2024-08-15 RX ORDER — AZITHROMYCIN 250 MG/1
TABLET, FILM COATED ORAL
Qty: 6 TABLET | Refills: 0 | Status: SHIPPED | OUTPATIENT
Start: 2024-08-15 | End: 2024-08-19

## 2024-08-15 RX ORDER — ALBUTEROL SULFATE 90 UG/1
2 AEROSOL, METERED RESPIRATORY (INHALATION) EVERY 4 HOURS PRN
Qty: 1 EACH | Refills: 1 | Status: SHIPPED | OUTPATIENT
Start: 2024-08-15

## 2024-08-15 NOTE — PROGRESS NOTES
Subjective:   Mckenna Lo is a 64 year old female who presents for a MA AHA (Medicare Advantage Annual Health Assessment) and Medicare Initial Preventative Physical Exam (Welcome to Medicare- < 12 months on Medicare) and scheduled follow up of multiple significant but stable problems.       64 yr old female who presents for Medicare physical. .  On disability. Takes care of  who had stroke in 2019. Walks with caen but trying to wean herself off. Going to take water aerobics classes. Planning on joining [employer] to take classes.     Has had cold symptoms for a few weeks. Has post nasal drainage. Has been coughing for a few weeks. Had laryngitis but improved.     Had left olecranon bursitis in July and went to .  Better after course of antibiotics.     Has chronic left knee pain.  Saw Orthopedics and physical therapy.     Sleep study showed moderate obstructive sleep apnea. Needs to see Pulmonary.     Up to date with mammo.     Needs colonoscopy.     Follows with Bariatric surgery.     Normal hearing. Wears glasses.     History/Other:   Fall Risk Assessment:   She has been screened for Falls and is low risk.      Cognitive Assessment:   She had a completely normal cognitive assessment - see flowsheet entries     Functional Ability/Status:   Mckenna Lo has some abnormal functions as listed below:  She has Walking problems based on screening of functional status.       Depression Screening (PHQ):  PHQ-2 SCORE: 0  , done 8/15/2024             Advanced Directives:   She does NOT have a Living Will. [Do you have a living will?: No]  She does NOT have a Power of  for Health Care. [Do you have a healthcare power of ?: No]  Discussed Advance Care Planning with patient (and family/surrogate if present). Standard forms made available to patient in After Visit Summary.      Patient Active Problem List   Diagnosis    Morbid obesity with BMI of 45.0-49.9, adult (HCC)    Diverticulosis of colon     Prediabetes     Allergies:  She has No Known Allergies.    Current Medications:  Outpatient Medications Marked as Taking for the 8/15/24 encounter (Office Visit) with Weiler, Colleen M, DO   Medication Sig    [] azithromycin (ZITHROMAX Z-LEONORA) 250 MG Oral Tab Take 2 tablets (500 mg total) by mouth daily for 1 day, THEN 1 tablet (250 mg total) daily for 4 days.    albuterol 108 (90 Base) MCG/ACT Inhalation Aero Soln Inhale 2 puffs into the lungs every 4 (four) hours as needed.       Medical History:  She  has a past medical history of Gigantomastia, Lipoma, Pneumonia due to organism (2018), and Screen for colon cancer (2019).  Surgical History:  She  has a past surgical history that includes reduction of large breast (); other surgical history; ankle fracture surgery (Right); arthroplasty patella (Left, ); and colonoscopy (N/A, 2019).   Family History:  Her family history includes Cancer in her father; Diabetes in her sister; Hypertension in her father and sister; Other in her mother.  Social History:  She  reports that she quit smoking about 19 years ago. Her smoking use included cigarettes. She has never used smokeless tobacco. She reports current alcohol use. She reports that she does not use drugs.    Tobacco:  She smoked tobacco in the past but quit greater than 12 months ago.  Social History     Tobacco Use   Smoking Status Former    Current packs/day: 0.00    Types: Cigarettes    Quit date:     Years since quittin.6   Smokeless Tobacco Never          CAGE Alcohol Screen:   CAGE screening score of 0 on 8/15/2024, showing low risk of alcohol abuse.      Patient Care Team:  Weiler, Colleen M, DO as PCP - General (Family Medicine)  Cedrick Yin DO as Consulting Physician (Physical Medicine)  Rex Groves, PT as Physical Therapist (Physical Therapy)  Rian Rubio PT as Physical Therapist (Physical Therapy)    Review of Systems  ROS:   Allergic/Immuno:  Negative for environmental  allergies and food allergies  Cardiovascular:  Negative for chest pain and irregular heartbeat/palpitations  Constitutional:  Negative for decreased activity, fever, irritability and lethargy  Endocrine:  Negative for abnormal sleep patterns, increased activity, polydipsia and polyphagia  ENMT:  Negative for ear drainage, hearing loss and nasal drainage  Eyes:  Negative for eye discharge and vision loss  Gastrointestinal:  Negative for abdominal pain, constipation, decreased appetite, diarrhea and vomiting  Genitourinary:  Negative for dysuria and hematuria  Hema/Lymph:  Negative for easy bleeding and easy bruising  Integumentary:  Negative for pruritus and rash  Musculoskeletal:  Negative for bone/joint symptoms  Neurological:  Negative for gait disturbance  Psychiatric:  Negative for inappropriate interaction and psychiatric symptoms      Objective:   Physical Exam       /86   Pulse 97   Temp 97.3 °F (36.3 °C) (Temporal)   Resp 18   Ht 5' 5\" (1.651 m)   Wt 259 lb (117.5 kg)   SpO2 94%   BMI 43.10 kg/m²  Estimated body mass index is 43.1 kg/m² as calculated from the following:    Height as of this encounter: 5' 5\" (1.651 m).    Weight as of this encounter: 259 lb (117.5 kg).    Medicare Hearing Assessment:   Hearing Screening    Screening Method: Questionnaire  I have a problem hearing over the telephone: No I have trouble following the conversations when two or more people are talking at the same time: No   I have trouble understanding things on the TV: No I have to strain to understand conversations: No   I have to worry about missing the telephone ring or doorbell: No I have trouble hearing conversations in a noisy background such as a crowded room or restaurant: No   I get confused about where sounds come from: No I misunderstand some words in a sentence and need to ask people to repeat themselves: No   I especially have trouble understanding the speech of women and children: No I have trouble  understanding the speaker in a large room such as at a meeting or place of Sabianism: No   Many people I talk to seem to mumble (or don't speak clearly): No People get annoyed because I misunderstand what they say: No   I misunderstand what others are saying and make inappropriate responses: No I avoid social activities because I cannot hear well and fear I will reply improperly: No   Family members and friends have told me they think I may have hearing loss: No             Visual Acuity:   Right Eye Visual Acuity: Corrected Right Eye Chart Acuity: 20/20   Left Eye Visual Acuity: Corrected Left Eye Chart Acuity: 20/25   Both Eyes Visual Acuity: Corrected Both Eyes Chart Acuity: 20/20   Able To Tolerate Visual Acuity: Yes    Gen:  Well-nourished.  No distress.  HEENT: Conjunctive clear.  Yohannes ear canals clear.  Yohannes TMs intact with good landmarks noted.  Nares patent.  Oral mucous membrane moist.  Normal lips, teeth, and gums.  Oropharynx normal.  Neck supple.  Good ROM.  No LAD.  Thyroid normal.  CV:  Regular rate and rhythm; no murmurs  Lungs:  Clear to ausculation; good aeration               No wheezes, rales or rhonchi  Abd: soft, non-tender, non-distended          Normal bowel sounds; no masses          No hepatosplenomegaly  Breasts:  Normal appearance bilateral.  No masses or lesions noted.  Normal nipples bilateral.  No nipple discharge noted.  Normal lymph nodes.  Extremities: No cyanosis, clubbing, edema.  Pedal pulses 2+ yohannes.        Assessment & Plan:   Mckenna Lo is a 64 year old female who presents for a Medicare Assessment.     1. Encounter for subsequent annual wellness visit (AWV) in Medicare patient (Primary)    Encouraged exercise and healthy diet. Labs done  -     CBC, Platelet; No Differential; Future; Expected date: 08/15/2024  -     Lipid Panel; Future; Expected date: 08/15/2024  -     Comp Metabolic Panel (14); Future; Expected date: 08/15/2024  -     Assay, Thyroid Stim Hormone; Future;  Expected date: 08/15/2024    2. Moderate obstructive sleep apnea    Will follow-up with Pulmonary.  -     Pulmonary Referral - In Network    3. Impaired glucose tolerance    Check hemoglobin A1C  -     Hemoglobin A1C; Future; Expected date: 08/15/2024    4. Encounter for screening mammogram for breast cancer     Mammogram ordered.  Will call with results.    -     [facility] JOSH 2D+3D SCREENING BILAT (CPT=77067/89752); Future; Expected date: 03/07/2025  5. Acute cough    Hs had a cough for weeks.  Start Zpack    6. Morbid obesity with BMI of 45.0-49.9, adult (HCC)    Follows with Bariatrics.     7. Prediabetes    Check hemoglobin A1C    8. Diverticulosis of colon    Stable.  CPM    9. Encounter for Medicare annual wellness exam    Encouraged healthy diet and exercise  Other orders  -     Prevnar 20 (PCV20) [87663]  -     Azithromycin; Take 2 tablets (500 mg total) by mouth daily for 1 day, THEN 1 tablet (250 mg total) daily for 4 days.  Dispense: 6 tablet; Refill: 0  -     Albuterol Sulfate HFA; Inhale 2 puffs into the lungs every 4 (four) hours as needed.  Dispense: 1 each; Refill: 1    The patient indicates understanding of these issues and agrees to the plan.  Reinforced healthy diet, lifestyle, and exercise.      Return in 6 months (on 2/15/2025).     Colleen Weiler, DO, 8/25/2024     Supplementary Documentation:   General Health:  In the past six months, have you lost more than 10 pounds without trying?: 2 - No  Has your appetite been poor?: No  Type of Diet: Balanced  How does the patient maintain a good energy level?: Stretching  How would you describe your daily physical activity?: Moderate  How would you describe your current health state?: Good  How do you maintain positive mental well-being?: Social Interaction;Puzzles;Games;Visiting Friends;Visiting Family  On a scale of 0 to 10, with 0 being no pain and 10 being severe pain, what is your pain level?: 4 - (Moderate)  In the past six months, have you  experienced urine leakage?: 0-No  At any time do you feel concerned for the safety/well-being of yourself and/or your children, in your home or elsewhere?: No  Have you had any immunizations at another office such as Influenza, Hepatitis B, Tetanus, or Pneumococcal?: No    Health Maintenance   Topic Date Due    MA Annual Health Assessment  01/01/2024    COVID-19 Vaccine (6 - 2023-24 season) 03/29/2024    DEXA Scan  Never done    Pneumococcal Vaccine: 65+ Years (1 of 1 - PCV) Never done    Colorectal Cancer Screening  09/04/2024    Mammogram  09/07/2024    Influenza Vaccine (1) 10/01/2024    Pap Smear  08/08/2028    Annual Depression Screening  Completed    Fall Risk Screening (Annual)  Completed    Zoster Vaccines  Completed

## 2024-08-21 ENCOUNTER — OFFICE VISIT (OUTPATIENT)
Dept: SURGERY | Facility: CLINIC | Age: 64
End: 2024-08-21
Payer: COMMERCIAL

## 2024-08-21 VITALS
HEIGHT: 63.9 IN | DIASTOLIC BLOOD PRESSURE: 78 MMHG | SYSTOLIC BLOOD PRESSURE: 126 MMHG | HEART RATE: 83 BPM | BODY MASS INDEX: 45.28 KG/M2 | WEIGHT: 262 LBS | OXYGEN SATURATION: 95 %

## 2024-08-21 DIAGNOSIS — Z51.81 ENCOUNTER FOR THERAPEUTIC DRUG MONITORING: Primary | ICD-10-CM

## 2024-08-21 DIAGNOSIS — E66.01 MORBID OBESITY WITH BMI OF 45.0-49.9, ADULT (HCC): ICD-10-CM

## 2024-08-21 DIAGNOSIS — R73.03 PREDIABETES: ICD-10-CM

## 2024-08-21 PROCEDURE — 99204 OFFICE O/P NEW MOD 45 MIN: CPT | Performed by: NURSE PRACTITIONER

## 2024-08-21 RX ORDER — DIETHYLPROPION HYDROCHLORIDE 75 MG/1
1 TABLET, EXTENDED RELEASE ORAL EVERY MORNING
Qty: 30 TABLET | Refills: 3 | Status: SHIPPED | OUTPATIENT
Start: 2024-08-21

## 2024-08-21 NOTE — PROGRESS NOTES
[facility] Consultation Note       Date of Consult:  2024    Patient:  Mckenna Lo  :      3/30/1960  MRN:      [id number]    Referring Provider: Dr. Weiler     Chief Complaint:    Chief Complaint   Patient presents with    Consult    Weight Management    Obesity       SUBJECTIVE     History of Present Illness:  Mckenna Lo has been referred to me for evaluation and treatment.       65 yo female.  Presents to clinic for assistance with weight loss/maintenance.   Reports significant weight gain after an accident in 2019.  also had a stroke that year- she is his caretaker.     Patient is considering medications and is not considering bariatric surgery for weight loss.    Patient is retired/disability.  Patient lives with , grand kids at times.    Patient's goal weight: under 200 lbs  Biggest weight loss in the past: 45 lbs   How weight loss was achieved: young age, exercise   Heaviest weight ever: Current   Previous use of medical weight loss medications: unsure of medication name     Physical activity: tries to go to the gym  [employer]     Sleep: 8-9 hours/night    Sleep screening:     Past Medical History:   Past Medical History:    Gigantomastia    Lipoma    Pneumonia due to organism    Screen for colon cancer    repeat CLN in 5 yrs due to hx of polyps        OBJECTIVE     Vitals: /78 (BP Location: Right arm, Patient Position: Sitting, Cuff Size: adult)   Pulse 83   Ht 5' 3.9\" (1.623 m)   Wt 262 lb (118.8 kg)   SpO2 95%   BMI 45.11 kg/m²        Wt Readings from Last 6 Encounters:   24 262 lb (118.8 kg)   08/15/24 259 lb (117.5 kg)   24 261 lb (118.4 kg)   24 261 lb (118.4 kg)   23 258 lb (117 kg)   23 263 lb (119.3 kg)        Patient Medications:    Current Outpatient Medications   Medication Sig Dispense Refill    Diethylpropion HCl ER 75 MG Oral Tablet 24 Hr Take 1 tablet (75 mg total) by mouth every morning. Start with 1/2  tablet daily, increase to full tablet daily as tolerated 30 tablet 3    albuterol 108 (90 Base) MCG/ACT Inhalation Aero Soln Inhale 2 puffs into the lungs every 4 (four) hours as needed. 1 each 1    Cholecalciferol (VITAMIN D) 50 MCG (2000 UT) Oral Cap Take by mouth.      Cyanocobalamin (B-12 OR) Take by mouth.      ibuprofen 200 MG Oral Tab Take 1 tablet (200 mg total) by mouth every 6 (six) hours as needed for Pain.      TURMERIC OR Take by mouth.      ZINC OR Use as directed in the mouth or throat.      Vitamin C (VITAMIN C) 500 MG Oral Chew Tab Chew  by mouth.      Multiple Vitamin (MULTIVITAMINS) Oral Cap Take  by mouth.         Allergies:  Patient has no known allergies.     Comorbidities:      Social History:  Reviewed     Surgical History:    Past Surgical History:   Procedure Laterality Date    Ankle fracture surgery Right     Arthroplasty patella Left 2019    Colonoscopy N/A 09/04/2019    Procedure: COLONOSCOPY;  Surgeon: OPHELIA Colbert MD;  Location: [facility] ENDOSCOPY    Other surgical history      left bunion surgery    Reduction of large breast  2008    Breast reduction to reduce back pain       Family History:    Family History   Problem Relation Age of Onset    Hypertension Father     Cancer Father         liver    Hypertension Sister     Diabetes Sister     Other (Other) Mother         dementia         Typical Dietary Intake:  Breakfast AM Snack Lunch PM Snack Dinner   Wakes up late    Oatmeal or cereal     2 eggs  Allen   Toast    Skips   Rice, potato  Meat  Vegetables     (Tacos)    Pasta      Ice cream once/week   After dinner behavior: -  Night eating: -  Portion sizes: -  Binge: -  Emotional: -  Depression: Score: 1  Grazing: -  Sweet tooth: very occasional   Crunchy/salty: -  Etoh: glass of wine nightly   Water intake is good   Soda Drinker: No    Sports Drinks:  No    Juice:  Yes  If yes, how much?:  occasional OJ    Number of restaurant or fast food meals/week: rare meals/week    Nutritional  Goals Reviewed and Discussed:     Eat 3-4 cups of fresh fruit or vegetables daily    Behavior Modifications Reviewed and Discussed:    Eat breakfast, Eat 3 meals per day, Plan meals in advance, Read nutrition labels, Drink 64oz of water per day, Maintain a daily food journal, Utilize portion control strategies to reduce calorie intake, Identify triggers for eating and manage cues, and Eat slowly and take 20 to 30 minutes to complete each meal      ROS:  Constitutional: negative  Respiratory: positive for cough  Cardiovascular: negative  Gastrointestinal: positive for reflux symptoms  Integument/breast: negative  Hematologic/lymphatic: negative  Musculoskeletal:positive for back pain  Neurological: negative  Behavioral/Psych: negative  Endocrine:  prediabetes     Physical Exam:  General appearance: alert, appears stated age, cooperative and obese, ambulates with cane   Head: Normocephalic, without obvious abnormality, atraumatic  Neck: no adenopathy, no carotid bruit, no JVD, supple, symmetrical, trachea midline and thyroid not enlarged, symmetric, no tenderness/mass/nodules  Lungs: clear to auscultation bilaterally  Heart: S1, S2 normal, no murmur, click, rub or gallop, regular rate and rhythm  Abdomen: soft, non-tender; bowel sounds normal; no masses,  no organomegaly and abdomen obese   Extremities: intact, no edema   Pulses: 2+ and symmetric  Skin: intact   Neurologic: Grossly normal      ASSESSMENT         Encounter Diagnosis(ses):   1. Encounter for therapeutic drug monitoring    2. Prediabetes    3. Morbid obesity with BMI of 45.0-49.9, adult ([facility])        PLAN         Diagnoses and all orders for this visit:    Encounter for therapeutic drug monitoring    Prediabetes  -     Diethylpropion HCl ER 75 MG Oral Tablet 24 Hr; Take 1 tablet (75 mg total) by mouth every morning. Start with 1/2 tablet daily, increase to full tablet daily as tolerated    Morbid obesity with BMI of 45.0-49.9, adult ([facility])  -      Diethylpropion HCl ER 75 MG Oral Tablet 24 Hr; Take 1 tablet (75 mg total) by mouth every morning. Start with 1/2 tablet daily, increase to full tablet daily as tolerated        OBESITY/WEIGHT GAIN:    Recommended intensive lifestyle and behavioral modifications at this time for weight loss.    Educated patient on lifestyle modifications: Whole Food/Plant Strong/Low Glycemic Index diet, moderate alcohol consumption, reduced sodium intake to no more than 2,400 mg/day, and at least 150 minutes of moderate physical activity per week.   Avoid processed, poor quality carbohydrates, refined grains, flour, sugar.    Goals for next month:  1. Keep a food log.  2. Drink 64 ounces of non-caloric beverages per day. No fruit juices or regular soda.  3. Aim for 150 minutes moderate exercise per week.    4. Increase fruit and vegetable servings to 5-6 per day.    5. Improve sleep and stress.     Reviewed labs:  8/15/24- TSH, CMP, cholesterol, CBC in range.   a1c 6.1- prediabetes.    Discussed medication options for weight loss in detail with patient.     Denies hx cardiac disease or substance abuse.    Start diethylpropion 1/2 tablet of 75 mg by mouth in the AM.    Consider additional topiramate, metformin, and/or Ozempic OOP as needed.      Discussed risks, benefits, rationale, and side effects of medication(s) including hypertension, palpitations, tachycardia, dizziness, constipation, dry mouth, and anxiety among others. Patient states understanding of all information and instructions.     EKG done 12/14/23.    Start Jump Start.     I spent 45 minutes preparing chart, obtaining/reviewing pertinent history, performing medically appropriate examination/evaluations, counseling/educating on assessment and plan, ordering and reviewing tests/medications as needed, referring/communicating with other health care professionals as needed, documenting clinical information, interpreting results, communicating results, and/or coordinating  patient care.     RTC 6 weeks virtual.     Rosalie Mathis, APRN

## 2024-08-25 PROBLEM — Z51.81 ENCOUNTER FOR THERAPEUTIC DRUG MONITORING: Status: RESOLVED | Noted: 2024-08-21 | Resolved: 2024-08-25

## 2024-09-16 ENCOUNTER — NURSE ONLY (OUTPATIENT)
Facility: CLINIC | Age: 64
End: 2024-09-16

## 2024-09-16 NOTE — PROGRESS NOTES
Patient completed Telephone Colon Screening. Due to BMI criteria Provider orders needed.Please provide procedure orders.    Thanks!

## 2024-09-16 NOTE — PROGRESS NOTES
Called patient for scheduled TCS/FIT+ result.   Medications, pharmacy, and allergies reviewed.   Please advise on colonoscopy and bowel prep orders.     Age 45-76 y/o: Yes  › MD preference:   › Insurance: [employer] [facility]  › Last pcp Visit: 8/15/2024  Pcp within [facility]:  › Last CBC: 8/15/2024  › Date of positive FIT:  › H/W/BMI: 5'3.9/ 262.0 Lbs/     Special comments/notes:  Recall    Last Procedure, Date, MD:   2019,    Last diagnosis: Pan-colonic diverticulosis.  Internal hemorrhoids, small.   Recalled for (mth/yrs): 5 Years   Sedation used previously: MAC   Last Prep Used: Trilyte   Quality of Prep: Good     Telephone Colon Screening Questionnaire Yes No   Are you currently experiencing any new/abnormal GI symptoms? [] [x]   If yes, explain:     Rectal bleeding? [] [x]   Black stool? [] [x]   Dysphagia or food \"feeling stuck\" when eating? [] [x]   Intractable vomiting? [] [x]   Unexplained weight loss? [] [x]   First colonoscopy? [x] []   Family history of colon cancer? [] [x]   Any issues with anesthesia? [] [x]   If yes, explain:      Stroke, heart attack or stent placement in the last 12 months:  [] [x]   History of  respiratory issues/oxygen/BO/COPD: [] [x]   If yes, CPAP/BiPAP:     History of devices (pacemaker/defibrillator) [] [x]   History of cardiac/CVA/(MI/stroke): [] [x]     Medications Yes  No   Anticoagulants (except Aspirin):  [] [x]   Diabetic Meds  PO: Hold day before and day of procedure  Insulin:  [] [x]   Weight loss meds (phentermine/vyvanse/saxsenda/etc): [x] []   Iron/herbal/multivitamin supplement: Multi, Vitamin D, all vitamins [x] []   Usage of marijuana, CBD &/or vape products: [] [x]      COLONOSCOPY REPORT           Mckenna GRAHAM 3/30/1960 Age 59 year old   PCP Colleen Weiler, DO Endoscopist Jaymie Colbert MD      Date of procedure: 19     Procedure: Colonoscopy      Pre-operative diagnosis: Screening     Post-operative diagnosis: Internal  hemorrhoids, diverticulosis of colon     Medications: MAC     Withdrawal time: 15 minutes     Procedure:  Informed consent was obtained from the patient after the risks of the procedure were discussed, including but not limited to bleeding, perforation, aspiration, infection, or possibility of a missed lesion. After discussions of the risks/benefits and alternatives to this procedure, as well as the planned sedation, the patient was placed in the left lateral decubitus position and begun on continuous blood pressure pulse oximetry and EKG monitoring and this was maintained throughout the procedure. Once an adequate level of sedation was obtained a digital rectal exam was completed. Then the lubricated tip of the [id number] diagnostic video colonoscope was inserted and advanced without difficulty to the cecum using the CO2 insufflation technique. The cecum was identified by localizing the trifold, the appendix and the ileocecal valve. Withdrawal was begun with thorough washing and careful examination of the colonic walls and folds. A routine second examination of the cecum/ascending colon was performed. Photodocumentation was obtained. The bowel prep was good. Views of the colon were good with washing. I then carefully withdrew the instrument from the patient who tolerated the procedure well.      Complications: none.     Findings:   1. No polyp(s) noted.     2. Diverticulosis: mild throughout the colon.     3. Terminal ileum: the visualized mucosa appeared normal.     4. A retroflexed view of the rectum revealed small internal hemorrhoids.     5. The colonic mucosa throughout the colon showed normal vascular pattern, without evidence of angioectasias or inflammation.      6. MARY: normal rectal tone, no masses palpated.      Impression:   Pan-colonic diverticulosis.  Internal hemorrhoids, small.      Recommend:  Repeat CLN in 5 years due to hx of polyps. If new signs or symptoms develop, colonoscopy may need  to be repeated sooner.   High fiber diet.     Specimens: none

## 2024-10-16 ENCOUNTER — TELEMEDICINE (OUTPATIENT)
Dept: SURGERY | Facility: CLINIC | Age: 64
End: 2024-10-16
Payer: COMMERCIAL

## 2024-10-16 VITALS — WEIGHT: 258 LBS | BODY MASS INDEX: 44 KG/M2

## 2024-10-16 DIAGNOSIS — E66.01 MORBID OBESITY WITH BMI OF 45.0-49.9, ADULT (HCC): ICD-10-CM

## 2024-10-16 DIAGNOSIS — Z51.81 ENCOUNTER FOR THERAPEUTIC DRUG MONITORING: Primary | ICD-10-CM

## 2024-10-16 DIAGNOSIS — R73.03 PREDIABETES: ICD-10-CM

## 2024-10-16 PROCEDURE — 99213 OFFICE O/P EST LOW 20 MIN: CPT | Performed by: NURSE PRACTITIONER

## 2024-10-16 RX ORDER — DIETHYLPROPION HYDROCHLORIDE 75 MG/1
1 TABLET, EXTENDED RELEASE ORAL EVERY MORNING
Qty: 30 TABLET | Refills: 3 | Status: SHIPPED | OUTPATIENT
Start: 2024-10-16

## 2024-10-16 NOTE — PROGRESS NOTES
Virtual Video & Audio Check-In    Mckenna Lo verbally consents to a Virtual Check-In visit on 10/16/24.  Patient has been referred to the [facility] website at [email or web address] to review the yearly Consent to Treat document.    Patient understands and accepts financial responsibility for any deductible, co-insurance and/or co-pays associated with this service.    Duration of the service: 10 minutes    Summary of topics discussed: Obesity/weight management, Lifestyle and behavior modifications, Medication management.    [facility]  [address]  [place] [address]  Dept: [phone]       Patient:  Mckenna Lo  :      3/30/1960  MRN:      [id number]    Chief Complaint:    Chief Complaint   Patient presents with    Follow - Up    Obesity    Weight Management       SUBJECTIVE     History of Present Illness:  Mckenna is being seen today for a follow-up for weight management.    Doing well.    Initial HPI:  65 yo female.  Presents to clinic for assistance with weight loss/maintenance.   Reports significant weight gain after an accident in 2019.  also had a stroke that year- she is his caretaker.     Patient is considering medications and is not considering bariatric surgery for weight loss.    Patient is retired/disability.  Patient lives with , grand kids at times.    Patient's goal weight: under 200 lbs  Biggest weight loss in the past: 45 lbs   How weight loss was achieved: young age, exercise   Heaviest weight ever: Current   Previous use of medical weight loss medications: unsure of medication name     Physical activity: tries to go to the gym  [employer]     Sleep: 8-9 hours/night    Sleep screening:       Past Medical History:   Past Medical History:    Gigantomastia    Lipoma    Pneumonia due to organism    Screen for colon cancer    repeat CLN in 5 yrs due to hx of polyps         Comorbidities:      OBJECTIVE      Vitals: Wt 258 lb (117 kg)   BMI 44.42 kg/m²     Initial weight loss: -04   Total weight loss: -04   Start weight: 262    Wt Readings from Last 6 Encounters:   10/16/24 258 lb (117 kg)   08/21/24 262 lb (118.8 kg)   08/15/24 259 lb (117.5 kg)   05/13/24 261 lb (118.4 kg)   04/03/24 261 lb (118.4 kg)   08/08/23 258 lb (117 kg)       Patient Medications:    Current Outpatient Medications   Medication Sig Dispense Refill    polyethylene glycol, PEG 3350-[id number]-[id number], 236 g Oral Recon Soln Take 4,000 mL by mouth As Directed. May substitute prescription with Golytely/Nulytely/Gavilyte and or generic equivalent, if needed. Take bowel preparation as provided by Gastroenterology, or visit our website at [email or web address] 4000 mL 0    Diethylpropion HCl ER 75 MG Oral Tablet 24 Hr Take 1 tablet (75 mg total) by mouth every morning. Start with 1/2 tablet daily, increase to full tablet daily as tolerated 30 tablet 3    albuterol 108 (90 Base) MCG/ACT Inhalation Aero Soln Inhale 2 puffs into the lungs every 4 (four) hours as needed. 1 each 1    Cholecalciferol (VITAMIN D) 50 MCG (2000 UT) Oral Cap Take by mouth.      Cyanocobalamin (B-12 OR) Take by mouth.      ibuprofen 200 MG Oral Tab Take 1 tablet (200 mg total) by mouth every 6 (six) hours as needed for Pain.      TURMERIC OR Take by mouth.      ZINC OR Use as directed in the mouth or throat.      Vitamin C (VITAMIN C) 500 MG Oral Chew Tab Chew  by mouth.      Multiple Vitamin (MULTIVITAMINS) Oral Cap Take  by mouth.       Allergies:  Patient has no known allergies.     Social History:  Reviewed     Surgical History:    Past Surgical History:   Procedure Laterality Date    Ankle fracture surgery Right     Arthroplasty patella Left 2019    Colonoscopy N/A 09/04/2019    Procedure: COLONOSCOPY;  Surgeon: OPHELIA Colbert MD;  Location: [facility] ENDOSCOPY    Other surgical history      left bunion surgery     Reduction of large breast  2008    Breast reduction to reduce back pain     Family History:    Family History   Problem Relation Age of Onset    Hypertension Father     Cancer Father         liver    Hypertension Sister     Diabetes Sister     Other (Other) Mother         dementia     Initial Intake:  Ice cream once/week   After dinner behavior: -  Night eating: -  Portion sizes: -  Binge: -  Emotional: -  Depression: Score: 1  Grazing: -  Sweet tooth: very occasional   Crunchy/salty: -  Etoh: glass of wine nightly   Water intake is good   Soda Drinker: No                     Sports Drinks:  No                  Juice:  Yes                 If yes, how much?:  occasional OJ  Number of restaurant or fast food meals/week: rare meals/week    Food Journal  Reviewed and Discussed:       Patient has a Food Journal?: no   Patient is reading nutrition labels?  yes  Average Caloric Intake:     Average CHO Intake:   Is patient exercising? yes  Type of exercise? More walking  Line dancing at [employer]     Eating Habits  Patient states the following:  Eats 2 meal(s) per day  Length of time it takes to consume a meal:    # of snacks per day:  Type of snacks:    Amount of soda consumption per day:  None   Amount of water (in ounces) per day:  adequate   Toughest challenge:      B: yogurt  D: salad, protein  Less red meat  Stopped cookies, candy; occasional baked chips     Nutritional Goals  Eat 3-4 cups of fresh fruits or vegetables daily    Behavior Modifications Reviewed and Discussed  Eat breakfast, Eat 3 meals per day, Plan meals in advance, Read nutrition labels, Drink 64 oz of water per day, Maintain a daily food journal, Utlize portion control strategies to reduce calorie intake, Identify triggers for eating and manage cues, and Eat slowly and take 20 to 30 minutes to complete each meal    Exercise Goals Reviewed and Discussed    Aim for 150 minutes moderate level exercise weekly with 2-3 days strength training as  tolerated     ROS:    Constitutional: negative  Respiratory: positive for cough  Cardiovascular: negative  Gastrointestinal: positive for reflux symptoms  Integument/breast: negative  Hematologic/lymphatic: negative  Musculoskeletal:positive for back pain  Neurological: negative  Behavioral/Psych: negative  Endocrine:  prediabetes   All other systems were reviewed and are negative    Physical Exam:  General: alert, oriented x 3, cooperative, speaking in full sentences, appears stated age and cooperative, obese   Head: Normocephalic, without obvious abnormality, atraumatic  Neck: symmetrical, trachea midline   Lungs: No increased work of breathing   Extremities: extremities normal  Skin: Upper body skin color and texture appear intact       ASSESSMENT       Encounter Diagnosis(ses):   Encounter Diagnoses   Name Primary?    Encounter for therapeutic drug monitoring Yes    Prediabetes     Morbid obesity with BMI of 45.0-49.9, adult ([facility])        PLAN         Diagnoses and all orders for this visit:    Encounter for therapeutic drug monitoring    Prediabetes    Morbid obesity with BMI of 45.0-49.9, adult ([facility])      OBESITY/WEIGHT GAIN:     Recommended patient continue intensive lifestyle and behavioral modifications at this time for weight loss.     Reviewed lifestyle modifications: Whole Food/Plant Strong/Low Glycemic Index diet, moderate alcohol consumption, reduced sodium intake to no more than 2,400 mg/day, and at least 150 minutes of moderate physical activity per week.   Avoid processed, poor quality carbohydrates, refined grains, flour, sugar.     Goals for next month:  1. Keep a food log.  2. Drink 64 ounces of non-caloric beverages per day. No fruit juices or regular soda.  3. Aim for 150 minutes moderate exercise per week.    4. Increase fruit and vegetable servings to 5-6 per day.    5. Improve sleep and stress.      Reviewed labs:  8/15/24- TSH, CMP, cholesterol, CBC in range.   a1c 6.1- prediabetes.      Discussed medication options for weight loss in detail with patient.      Denies hx cardiac disease or substance abuse.     Continue diethylpropion 1/2 tablet of 75 mg by mouth in the AM.  Increase to full tablet daily as tolerated.      Consider additional topiramate, metformin, and/or Ozempic OOP as needed.       Discussed risks, benefits, rationale, and side effects of medication(s) including hypertension, palpitations, tachycardia, dizziness, constipation, dry mouth, and anxiety among others. Patient states understanding of all information and instructions.      EKG done 12/14/23.     Plans to start Jump Start.      RTC 2 months.      CINTHIA Krause

## 2024-12-23 ENCOUNTER — OFFICE VISIT (OUTPATIENT)
Dept: SURGERY | Facility: CLINIC | Age: 64
End: 2024-12-23
Payer: COMMERCIAL

## 2024-12-23 VITALS
BODY MASS INDEX: 44.25 KG/M2 | HEIGHT: 63.9 IN | DIASTOLIC BLOOD PRESSURE: 80 MMHG | OXYGEN SATURATION: 98 % | HEART RATE: 107 BPM | SYSTOLIC BLOOD PRESSURE: 122 MMHG | WEIGHT: 256 LBS

## 2024-12-23 DIAGNOSIS — E66.01 MORBID OBESITY WITH BMI OF 45.0-49.9, ADULT (HCC): ICD-10-CM

## 2024-12-23 DIAGNOSIS — R73.03 PREDIABETES: ICD-10-CM

## 2024-12-23 DIAGNOSIS — Z51.81 ENCOUNTER FOR THERAPEUTIC DRUG MONITORING: Primary | ICD-10-CM

## 2024-12-23 PROCEDURE — 99213 OFFICE O/P EST LOW 20 MIN: CPT | Performed by: NURSE PRACTITIONER

## 2024-12-23 RX ORDER — DIETHYLPROPION HYDROCHLORIDE 75 MG/1
1 TABLET, EXTENDED RELEASE ORAL EVERY MORNING
Qty: 30 TABLET | Refills: 3 | Status: SHIPPED | OUTPATIENT
Start: 2024-12-23

## 2024-12-23 NOTE — PROGRESS NOTES
[facility]  [address] [facility] [address] [place] [id number]  Dept: [phone]       Patient:  Mckenna Lo  :      3/30/1960  MRN:      [id number]    Chief Complaint:    Chief Complaint   Patient presents with    Follow - Up    Weight Management    Obesity       SUBJECTIVE     History of Present Illness:  Mckenna is being seen today for a follow-up for weight management.    Doing well.  Increased diethylpropion to full tablet last week.     Initial HPI:  63 yo female.  Presents to clinic for assistance with weight loss/maintenance.   Reports significant weight gain after an accident in 2019.  also had a stroke that year- she is his caretaker.     Patient is considering medications and is not considering bariatric surgery for weight loss.    Patient is retired/disability.  Patient lives with , grand kids at times.    Patient's goal weight: under 200 lbs  Biggest weight loss in the past: 45 lbs   How weight loss was achieved: young age, exercise   Heaviest weight ever: Current   Previous use of medical weight loss medications: unsure of medication name     Physical activity: tries to go to the gym  [employer]     Sleep: 8-9 hours/night    Sleep screening:       Past Medical History:   Past Medical History:    Gigantomastia    Lipoma    Pneumonia due to organism    Screen for colon cancer    repeat CLN in 5 yrs due to hx of polyps         Comorbidities:      OBJECTIVE     Vitals: /80   Pulse 107   Ht 5' 3.9\" (1.623 m)   Wt 256 lb (116.1 kg)   SpO2 98%   BMI 44.08 kg/m²     Initial weight loss: -02   Total weight loss: -06   Start weight: 262    Wt Readings from Last 6 Encounters:   24 256 lb (116.1 kg)   10/16/24 258 lb (117 kg)   24 262 lb (118.8 kg)   08/15/24 259 lb (117.5 kg)   24 261 lb (118.4 kg)   24 261 lb (118.4 kg)       Patient Medications:    Current Outpatient Medications   Medication  Sig Dispense Refill    Diethylpropion HCl ER 75 MG Oral Tablet 24 Hr Take 1 tablet (75 mg total) by mouth every morning. Start with 1/2 tablet daily, increase to full tablet daily as tolerated 30 tablet 3    polyethylene glycol, PEG 3350-KCl-[id number]-NaCl-NaSulf, 236 g Oral Recon Soln Take 4,000 mL by mouth As Directed. May substitute prescription with Golytely/Nulytely/Gavilyte and or generic equivalent, if needed. Take bowel preparation as provided by Gastroenterology, or visit our website at [email or web address] 4000 mL 0    albuterol 108 (90 Base) MCG/ACT Inhalation Aero Soln Inhale 2 puffs into the lungs every 4 (four) hours as needed. 1 each 1    Cholecalciferol (VITAMIN D) 50 MCG (2000 UT) Oral Cap Take by mouth.      Cyanocobalamin (B-12 OR) Take by mouth.      ibuprofen 200 MG Oral Tab Take 1 tablet (200 mg total) by mouth every 6 (six) hours as needed for Pain.      TURMERIC OR Take by mouth.      ZINC OR Use as directed in the mouth or throat.      Vitamin C (VITAMIN C) 500 MG Oral Chew Tab Chew  by mouth.      Multiple Vitamin (MULTIVITAMINS) Oral Cap Take  by mouth.       Allergies:  Patient has no known allergies.     Social History:  Reviewed     Surgical History:    Past Surgical History:   Procedure Laterality Date    Ankle fracture surgery Right     Arthroplasty patella Left 2019    Colonoscopy N/A 09/04/2019    Procedure: COLONOSCOPY;  Surgeon: OPHELIA Colbert MD;  Location: [facility] ENDOSCOPY    Other surgical history      left bunion surgery    Reduction of large breast  2008    Breast reduction to reduce back pain     Family History:    Family History   Problem Relation Age of Onset    Hypertension Father     Cancer Father         liver    Hypertension Sister     Diabetes Sister     Other (Other) Mother         dementia     Initial Intake:  Ice cream once/week   After dinner behavior: -  Night eating: -  Portion sizes: -  Binge: -  Emotional:  -  Depression: Score: 1  Grazing: -  Sweet tooth: very occasional   Crunchy/salty: -  Etoh: glass of wine nightly   Water intake is good   Soda Drinker: No                     Sports Drinks:  No                  Juice:  Yes                 If yes, how much?:  occasional OJ  Number of restaurant or fast food meals/week: rare meals/week    Food Journal  Reviewed and Discussed:       Patient has a Food Journal?: no   Patient is reading nutrition labels?  yes  Average Caloric Intake:     Average CHO Intake:   Is patient exercising? yes  Type of exercise? More walking  Line dancing at [employer]     Eating Habits  Patient states the following:  Eats 2 meal(s) per day  Length of time it takes to consume a meal:    # of snacks per day:  Type of snacks:    Amount of soda consumption per day:  None   Amount of water (in ounces) per day:  adequate   Toughest challenge:  wine     Nutritional Goals  Eat 3-4 cups of fresh fruits or vegetables daily    Behavior Modifications Reviewed and Discussed  Eat breakfast, Eat 3 meals per day, Plan meals in advance, Read nutrition labels, Drink 64 oz of water per day, Maintain a daily food journal, Utlize portion control strategies to reduce calorie intake, Identify triggers for eating and manage cues, and Eat slowly and take 20 to 30 minutes to complete each meal    Exercise Goals Reviewed and Discussed    Aim for 150 minutes moderate level exercise weekly with 2-3 days strength training as tolerated     ROS:    Constitutional: negative  Respiratory: positive for cough  Cardiovascular: negative  Gastrointestinal: positive for reflux symptoms  Integument/breast: negative  Hematologic/lymphatic: negative  Musculoskeletal:positive for back pain  Neurological: negative  Behavioral/Psych: negative  Endocrine:  prediabetes   All other systems were reviewed and are negative    Physical Exam:  General appearance: alert, appears stated age, cooperative and obese, ambulates with cane  Head:  Normocephalic, without obvious abnormality, atraumatic  Neck: no adenopathy, no carotid bruit, no JVD, supple, symmetrical, trachea midline and thyroid not enlarged, symmetric, no tenderness/mass/nodules  Lungs: clear to auscultation bilaterally  Heart: S1, S2 normal, no murmur, click, rub or gallop, regular rate and rhythm  Abdomen: soft, non-tender; bowel sounds normal; no masses,  no organomegaly and abdomen obese   Extremities: intact, no edema   Pulses: 2+ and symmetric  Skin: intact   Neurologic: Grossly normal      ASSESSMENT       Encounter Diagnosis(ses):   Encounter Diagnoses   Name Primary?    Encounter for therapeutic drug monitoring Yes    Prediabetes     Morbid obesity with BMI of 45.0-49.9, adult ([facility])        PLAN         Diagnoses and all orders for this visit:    Encounter for therapeutic drug monitoring    Prediabetes    Morbid obesity with BMI of 45.0-49.9, adult ([facility])  -     Diethylpropion HCl ER 75 MG Oral Tablet 24 Hr; Take 1 tablet (75 mg total) by mouth every morning. Start with 1/2 tablet daily, increase to full tablet daily as tolerated      OBESITY/WEIGHT GAIN:     Recommended patient continue intensive lifestyle and behavioral modifications at this time for weight loss.     Reviewed lifestyle modifications: Whole Food/Plant Strong/Low Glycemic Index diet, moderate alcohol consumption, reduced sodium intake to no more than 2,400 mg/day, and at least 150 minutes of moderate physical activity per week.   Avoid processed, poor quality carbohydrates, refined grains, flour, sugar.     Goals for next month:  1. Keep a food log.  2. Drink 64 ounces of non-caloric beverages per day. No fruit juices or regular soda.  3. Aim for 150 minutes moderate exercise per week.    4. Increase fruit and vegetable servings to 5-6 per day.    5. Improve sleep and stress.      Reviewed labs:  8/15/24- TSH, CMP, cholesterol, CBC in range.   a1c 6.1- prediabetes.     Discussed medication options for weight loss in  detail with patient.      Denies hx cardiac disease or substance abuse.     Continue diethylpropion full tablet of 75 mg by mouth in the AM.    Consider additional topiramate, metformin, and/or semaglutide.      Discussed risks, benefits, rationale, and side effects of medication(s) including hypertension, palpitations, tachycardia, dizziness, constipation, dry mouth, and anxiety among others. Patient states understanding of all information and instructions.      EKG done 12/14/23.     Plans to start Jump Start.      RTC 4 months.      CINTHIA Krause

## 2025-01-16 ENCOUNTER — OFFICE VISIT (OUTPATIENT)
Dept: FAMILY MEDICINE CLINIC | Facility: CLINIC | Age: 65
End: 2025-01-16
Payer: MEDICARE

## 2025-01-16 ENCOUNTER — HOSPITAL ENCOUNTER (OUTPATIENT)
Dept: GENERAL RADIOLOGY | Age: 65
Discharge: HOME | End: 2025-01-16
Attending: FAMILY MEDICINE
Payer: MEDICARE

## 2025-01-16 VITALS
HEART RATE: 96 BPM | TEMPERATURE: 98 F | OXYGEN SATURATION: 96 % | SYSTOLIC BLOOD PRESSURE: 127 MMHG | WEIGHT: 257 LBS | BODY MASS INDEX: 44 KG/M2 | DIASTOLIC BLOOD PRESSURE: 82 MMHG | RESPIRATION RATE: 18 BRPM

## 2025-01-16 DIAGNOSIS — M79.671 RIGHT FOOT PAIN: ICD-10-CM

## 2025-01-16 DIAGNOSIS — K21.9 GASTROESOPHAGEAL REFLUX DISEASE WITHOUT ESOPHAGITIS: ICD-10-CM

## 2025-01-16 DIAGNOSIS — R05.9 COUGH, UNSPECIFIED TYPE: Primary | ICD-10-CM

## 2025-01-16 PROCEDURE — 73630 X-RAY EXAM OF FOOT: CPT | Performed by: FAMILY MEDICINE

## 2025-01-16 NOTE — PROGRESS NOTES
HPI: Mckenna is a 64 year old female who presents for multiple concerns.     Had a cold in the beginning of January.  Was coughing and had ear pain. Took OTC meds. Improved now. Still has occasional cough at night. Has not had flu or COVID vaccine.     Has pain and swelling to the top of her foot. Hurts to stand on it.  Has been hurting for the past 2 months. Intermittent.     Has been getting more heartburn. Has been happening more frequently. Certain foods affect the symptoms- red sauces, red meat, wine.  Took Pepcid but it did not work.     PMH:    Past Medical History:    Gigantomastia    Lipoma    Pneumonia due to organism    Screen for colon cancer    repeat CLN in 5 yrs due to hx of polyps       Alg:  Patient has no known allergies.   Meds: Medications Ordered Prior to Encounter[1]   Tobacco Use: no    ROS: see HPI    Objective:   Gen: AOx3. NAD.  /82   Pulse 96   Temp 98 °F (36.7 °C) (Temporal)   Resp 18   Wt 257 lb (116.6 kg)   SpO2 96%   BMI 44.25 kg/m²   CV:  Regular rate and rhythm; no murmurs  Lungs:  Clear to ausculation; good aeration               No wheezes, rales or rhonchi  MSK: mild tenderness noted to top of right foot    Assessment:/Plan:  Encounter Diagnoses   Name Primary?    Cough, unspecified type    Lungs clear.  Likely viral etiology.   Yes    Right foot pain    Suspect OA.  Xray ordered. Refer to Podiatry.        Gastroesophageal reflux disease without esophagitis    Pepcid did not seem to help.  Will start Prilosec. Discussed dietary changes.       Colleen Weiler, DO           [1]   Current Outpatient Medications on File Prior to Visit   Medication Sig Dispense Refill    Diethylpropion HCl ER 75 MG Oral Tablet 24 Hr Take 1 tablet (75 mg total) by mouth every morning. Start with 1/2 tablet daily, increase to full tablet daily as tolerated 30 tablet 3    Cholecalciferol (VITAMIN D) 50 MCG (2000 UT) Oral Cap Take by mouth.      Cyanocobalamin (B-12 OR) Take by mouth.       TURMERIC OR Take by mouth.      Vitamin C (VITAMIN C) 500 MG Oral Chew Tab Chew  by mouth.      Multiple Vitamin (MULTIVITAMINS) Oral Cap Take  by mouth.      albuterol 108 (90 Base) MCG/ACT Inhalation Aero Soln Inhale 2 puffs into the lungs every 4 (four) hours as needed. 1 each 1    ibuprofen 200 MG Oral Tab Take 1 tablet (200 mg total) by mouth every 6 (six) hours as needed for Pain.       No current facility-administered medications on file prior to visit.

## 2025-01-28 ENCOUNTER — ANESTHESIA (OUTPATIENT)
Dept: ENDOSCOPY | Facility: HOSPITAL | Age: 65
End: 2025-01-28
Payer: MEDICARE

## 2025-01-28 ENCOUNTER — TELEPHONE (OUTPATIENT)
Facility: CLINIC | Age: 65
End: 2025-01-28

## 2025-01-28 ENCOUNTER — HOSPITAL ENCOUNTER (OUTPATIENT)
Facility: HOSPITAL | Age: 65
Setting detail: HOSPITAL OUTPATIENT SURGERY
Discharge: HOME | End: 2025-01-28
Attending: INTERNAL MEDICINE | Admitting: INTERNAL MEDICINE
Payer: MEDICARE

## 2025-01-28 ENCOUNTER — ANESTHESIA EVENT (OUTPATIENT)
Dept: ENDOSCOPY | Facility: HOSPITAL | Age: 65
End: 2025-01-28
Payer: MEDICARE

## 2025-01-28 VITALS
SYSTOLIC BLOOD PRESSURE: 146 MMHG | WEIGHT: 257 LBS | DIASTOLIC BLOOD PRESSURE: 88 MMHG | BODY MASS INDEX: 42.82 KG/M2 | OXYGEN SATURATION: 94 % | HEIGHT: 65 IN | RESPIRATION RATE: 24 BRPM | HEART RATE: 85 BPM

## 2025-01-28 DIAGNOSIS — Z12.11 SCREEN FOR COLON CANCER: ICD-10-CM

## 2025-01-28 PROBLEM — K64.8 INTERNAL HEMORRHOIDS: Status: ACTIVE | Noted: 2025-01-28

## 2025-01-28 PROCEDURE — 45378 DIAGNOSTIC COLONOSCOPY: CPT | Performed by: INTERNAL MEDICINE

## 2025-01-28 PROCEDURE — 0DJD8ZZ INSPECTION OF LOWER INTESTINAL TRACT, VIA NATURAL OR ARTIFICIAL OPENING ENDOSCOPIC: ICD-10-PCS | Performed by: INTERNAL MEDICINE

## 2025-01-28 RX ORDER — SODIUM CHLORIDE, SODIUM LACTATE, POTASSIUM CHLORIDE, CALCIUM CHLORIDE 600; 310; 30; 20 MG/100ML; MG/100ML; MG/100ML; MG/100ML
INJECTION, SOLUTION INTRAVENOUS CONTINUOUS
Status: DISCONTINUED | OUTPATIENT
Start: 2025-01-28 | End: 2025-01-28

## 2025-01-28 RX ORDER — LIDOCAINE HYDROCHLORIDE 10 MG/ML
INJECTION, SOLUTION EPIDURAL; INFILTRATION; INTRACAUDAL; PERINEURAL AS NEEDED
Status: DISCONTINUED | OUTPATIENT
Start: 2025-01-28 | End: 2025-01-28 | Stop reason: SURG

## 2025-01-28 RX ORDER — NALOXONE HYDROCHLORIDE 0.4 MG/ML
0.08 INJECTION, SOLUTION INTRAMUSCULAR; INTRAVENOUS; SUBCUTANEOUS ONCE AS NEEDED
Status: DISCONTINUED | OUTPATIENT
Start: 2025-01-28 | End: 2025-01-28

## 2025-01-28 RX ADMIN — LIDOCAINE HYDROCHLORIDE 50 MG: 10 INJECTION, SOLUTION EPIDURAL; INFILTRATION; INTRACAUDAL; PERINEURAL at 09:47:00

## 2025-01-28 RX ADMIN — SODIUM CHLORIDE, SODIUM LACTATE, POTASSIUM CHLORIDE, CALCIUM CHLORIDE: 600; 310; 30; 20 INJECTION, SOLUTION INTRAVENOUS at 10:08:00

## 2025-01-28 NOTE — DISCHARGE INSTRUCTIONS
Home Care Instructions for Colonoscopy with Sedation    Diet:  - Resume your regular diet as tolerated unless otherwise instructed.  - Start with light meals to minimize bloating.  - Do not drink alcohol today.    Medication:  - If you have questions about resuming your normal medications, please contact your Primary Care Physician.    Activities:  - Take it easy today. Do not return to work today.  - Do not drive today.  - Do not operate any machinery today (including kitchen equipment).    Colonoscopy:  - You may notice some rectal \"spotting\" (a little blood on the toilet tissue) for a day or two after the exam. This is normal.  - If you experience any rectal bleeding (not spotting), persistent tenderness or sharp severe abdominal pains, oral temperature over 100 degrees Fahrenheit, light-headedness or dizziness, or any other problems, contact your doctor.    **If unable to reach your doctor, please go to the [facility] Emergency Room**    - Your referring physician will receive a full report of your examination.  - If you do not hear from your doctor's office within two weeks of your biopsy, please call them for your results.    You may be able to see your laboratory results in [employer] between 4 and 7 business days.  In some cases, your physician may not have viewed the results before they are released to [employer].  If you have questions regarding your results contact the physician who ordered the test/exam by phone or via [employer] by choosing \"Ask a Medical Question.\"

## 2025-01-28 NOTE — ANESTHESIA POSTPROCEDURE EVALUATION
Patient: Mckenna Lo    Procedure Summary       Date: 01/28/25 Room / Location: [facility] ENDOSCOPY 03 / [facility] ENDOSCOPY    Anesthesia Start: 0943 Anesthesia Stop:     Procedure: COLONOSCOPY Diagnosis:       Screen for colon cancer      (diverticulosis, hemorrhoids)    Surgeons: OPHELIA Colbert MD Anesthesiologist: Grey Montano CRNA    Anesthesia Type: MAC ASA Status: 2            Anesthesia Type: No value filed.    Vitals Value Taken Time   /77 01/28/25 1008   Temp  01/28/25 1008   Pulse 81 01/28/25 1008   Resp 16 01/28/25 1008   SpO2 100 01/28/25 1008       [facility] AN Post Evaluation:   Patient Evaluated in PACU  Patient Participation: complete - patient participated  Level of Consciousness: awake, awake and alert and responsive to verbal stimuli  Pain Score: 0  Pain Management: adequate  Airway Patency:patent  Yes    Nausea/Vomiting: none  Cardiovascular Status: acceptable, stable and hemodynamically stable  Respiratory Status: spontaneous ventilation, nonlabored ventilation and room air  Postoperative Hydration acceptable      Grey Montano CRNA  1/28/2025 10:08 AM

## 2025-01-28 NOTE — TELEPHONE ENCOUNTER
Health maintenance updated. Last colonoscopy done 01/28/25. 7 year recall placed into [facility], next due on 01/2032 per Dr. Colbert  .

## 2025-01-28 NOTE — OPERATIVE REPORT
COLONOSCOPY REPORT    Mckenna Lo     3/30/1960 Age 64 year old   PCP Colleen Weiler, DO Endoscopist Jaymie Colbert MD     Date of procedure: 25    Procedure: Colonoscopy     Pre-operative diagnosis: Screening    Post-operative diagnosis: Diverticulosis and internal hemorrhoids.    Medications: MAC    Withdrawal time: 9 minutes    Procedure:  Informed consent was obtained from the patient after the risks of the procedure were discussed, including but not limited to bleeding, perforation, aspiration, infection, or possibility of a missed lesion. After discussions of the risks/benefits and alternatives to this procedure, as well as the planned sedation, the patient was placed in the left lateral decubitus position and begun on continuous blood pressure pulse oximetry and EKG monitoring and this was maintained throughout the procedure. Once an adequate level of sedation was obtained a digital rectal exam was completed. Then the lubricated tip of the [id number] diagnostic video colonoscope was inserted and advanced without difficulty to the cecum using the CO2 insufflation technique. The cecum was identified by localizing the trifold, the appendix and the ileocecal valve. Withdrawal was begun with thorough washing and careful examination of the colonic walls and folds. A routine second examination of the cecum/ascending colon was performed. Photodocumentation was obtained. The bowel prep was good. Views of the colon were good with washing. I then carefully withdrew the instrument from the patient who tolerated the procedure well.     Complications: none.    Findings:   1. No polyp(s) noted.    2. Diverticulosis: mild throughout the colon.    3. Terminal ileum: the visualized mucosa appeared normal.    4. The colonic mucosa throughout the colon showed normal vascular pattern, without evidence of angioectasias or inflammation.     5. A retroflexed view of the rectum revealed small internal  hemorrhoids.    6. MARY: normal rectal tone, no masses palpated.     Impression:   Diverticulosis and internal hemorrhoids.  No polyps noted.  Due to coughing/retching during the procedure (despite our best efforts to control this), visualization was difficult at times.     Recommend:  Repeat CLN in 7 years due to hx of polyps. If new signs or symptoms develop, colonoscopy may need to be repeated sooner.   High fiber diet.    Specimens: none  Blood loss: <1 ml

## 2025-01-28 NOTE — ANESTHESIA PREPROCEDURE EVALUATION
Anesthesia PreOp Note    HPI:     Mckenna Lo is a 64 year old female who presents for preoperative consultation requested by: OPHELIA Colbert MD    Date of Surgery: 2025    Procedure(s):  COLONOSCOPY  Indication: Screen for colon cancer    Relevant Problems   No relevant active problems       NPO:  Last Liquid Consumption Date: 25  Last Liquid Consumption Time: 0558  Last Solid Consumption Date: 25  Last Solid Consumption Time: 930  Last Liquid Consumption Date: 25          History Review:  Patient Active Problem List    Diagnosis Date Noted    Encounter for therapeutic drug monitoring 2024    Prediabetes 2024    Diverticulosis of colon     Morbid obesity with BMI of 45.0-49.9, adult ([facility]) 2017       Past Medical History:    Gigantomastia    Lipoma    Pneumonia due to organism    Screen for colon cancer    repeat CLN in 5 yrs due to hx of polyps     Visual impairment    glasses       Past Surgical History:   Procedure Laterality Date    Ankle fracture surgery Right     Arthroplasty patella Left 2019    Colonoscopy N/A 2019    Procedure: COLONOSCOPY;  Surgeon: OPHELIA Colbert MD;  Location: [facility] ENDOSCOPY    Other surgical history      left bunion surgery    Reduction of large breast      Breast reduction to reduce back pain       Prescriptions Prior to Admission[1]  Current Medications and Prescriptions Ordered in Epic[2]    Allergies[3]    Family History   Problem Relation Age of Onset    Hypertension Father     Cancer Father         liver    Hypertension Sister     Diabetes Sister     Other (Other) Mother         dementia     Social History     Socioeconomic History    Marital status: Single   Tobacco Use    Smoking status: Former     Current packs/day: 0.00     Types: Cigarettes     Quit date:      Years since quittin.0    Smokeless tobacco: Never   Vaping Use    Vaping status: Never Used   Substance and Sexual Activity    Alcohol use: Yes      Comment: occasional    Drug use: No   Other Topics Concern    Caffeine Concern Yes     Comment: Coffee, Occasional, 1 cup daily    Exercise No    Reaction to local anesthetic No       Available pre-op labs reviewed.             Vital Signs:  Body mass index is 42.77 kg/m².   height is 1.651 m (5' 5\") and weight is 116.6 kg (257 lb). Her blood pressure is 144/82 and her pulse is 96. Her respiration is 18 and oxygen saturation is 92%.   Vitals:    01/21/25 1159 01/28/25 0933   BP:  144/82   Pulse:  96   Resp:  18   SpO2:  92%   Weight: 116.6 kg (257 lb)    Height: 1.651 m (5' 5\")         Anesthesia Evaluation     Patient summary reviewed and Nursing notes reviewed    No history of anesthetic complications   Airway   Mallampati: III  TM distance: >3 FB  Neck ROM: full  Dental - Dentition appears grossly intact     Pulmonary - negative ROS and normal exam   (-) sleep apnea  Cardiovascular - negative ROS and normal exam    Rhythm: regular  Rate: normal    Neuro/Psych - negative ROS     GI/Hepatic/Renal    (+) bowel prep    Endo/Other      Comments: 08/15/24 11:26  HEMOGLOBIN A1c: 6.1 (H)        Abdominal                  Anesthesia Plan:   ASA:  2  Plan:   MAC  Informed Consent Plan and Risks Discussed With:  Patient  Discussed plan with:  Surgeon      I have informed Mckenna Lo and/or legal guardian or family member of the nature of the anesthetic plan, benefits, risks including possible dental damage if relevant, major complications, and any alternative forms of anesthetic management.   All of the patient's questions were answered to the best of my ability. The patient desires the anesthetic management as planned.  Grey Montano CRNA  1/28/2025 9:39 AM  Present on Admission:  **None**           [1]   Medications Prior to Admission   Medication Sig Dispense Refill Last Dose/Taking    Aspirin-Caffeine (RIDGE BACK & BODY PAIN EX ST) 500-32.5 MG Oral Tab Take 1 tablet by mouth daily.   1/21/2025    Diethylpropion HCl ER  75 MG Oral Tablet 24 Hr Take 1 tablet (75 mg total) by mouth every morning. Start with 1/2 tablet daily, increase to full tablet daily as tolerated (Patient taking differently: Take 1 tablet (75 mg total) by mouth every morning. Start with 1/2 tablet daily, increase to full tablet daily as tolerated) 30 tablet 3 1/21/2025    albuterol 108 (90 Base) MCG/ACT Inhalation Aero Soln Inhale 2 puffs into the lungs every 4 (four) hours as needed. 1 each 1 Taking As Needed    Cholecalciferol (VITAMIN D) 50 MCG (2000 UT) Oral Cap Place 1 capsule (2,000 Units total) onto teeth.   Taking    Cyanocobalamin (B-12 OR) Take 1 tablet by mouth daily.   1/21/2025    ibuprofen 200 MG Oral Tab Take 1 tablet (200 mg total) by mouth every 6 (six) hours as needed for Pain.   Taking As Needed    Multiple Vitamin (MULTIVITAMINS) Oral Cap Take 1 capsule by mouth daily.   Taking   [2]   Current Facility-Administered Medications Ordered in Epic   Medication Dose Route Frequency Provider Last Rate Last Admin    lactated ringers infusion   Intravenous Continuous OPHELIA Colbert MD         No current [facility]-ordered outpatient medications on file.   [3] No Known Allergies

## 2025-01-28 NOTE — H&P
History & Physical Examination    Patient Name: Mckenna Lo  MRN: [id number]  CSN: [id number]  YOB: 1960    Diagnosis: screening for colon cancer    Prescriptions Prior to Admission[1]  Current Facility-Administered Medications   Medication Dose Route Frequency    lactated ringers infusion   Intravenous Continuous       Allergies: Allergies[2]    Past Medical History:    Gigantomastia    Lipoma    Pneumonia due to organism    Screen for colon cancer    repeat CLN in 5 yrs due to hx of polyps     Visual impairment    glasses     Past Surgical History:   Procedure Laterality Date    Ankle fracture surgery Right     Arthroplasty patella Left 2019    Colonoscopy N/A 2019    Procedure: COLONOSCOPY;  Surgeon: OPHELIA Colbert MD;  Location: [facility] ENDOSCOPY    Other surgical history      left bunion surgery    Reduction of large breast      Breast reduction to reduce back pain     Family History   Problem Relation Age of Onset    Hypertension Father     Cancer Father         liver    Hypertension Sister     Diabetes Sister     Other (Other) Mother         dementia     Social History     Tobacco Use    Smoking status: Former     Current packs/day: 0.00     Types: Cigarettes     Quit date:      Years since quittin.0    Smokeless tobacco: Never   Substance Use Topics    Alcohol use: Yes     Comment: occasional       SYSTEM Check if Review is Normal Check if Physical Exam is Normal If not normal, please explain:   HEENT [X ] [ X]    NECK  [X ] [ X]    HEART [X ] [ X]    LUNGS [X ] [ X]    ABDOMEN [X ] [ X]    EXTREMITIES [X ] [ X]    OTHER        I have discussed the risks and benefits and alternatives of the procedure with the patient/family.  They understand and agree to proceed with plan of care.   I have reviewed the History and Physical done within the last 30 days.  Any changes noted above.    JASVIR Colbert MD  [facility] - Gastroenterology  2025  9:45 AM                 [1]    Medications Prior to Admission   Medication Sig Dispense Refill Last Dose/Taking    Aspirin-Caffeine (RIDGE BACK & BODY PAIN EX ST) 500-32.5 MG Oral Tab Take 1 tablet by mouth daily.   1/21/2025    Diethylpropion HCl ER 75 MG Oral Tablet 24 Hr Take 1 tablet (75 mg total) by mouth every morning. Start with 1/2 tablet daily, increase to full tablet daily as tolerated (Patient taking differently: Take 1 tablet (75 mg total) by mouth every morning. Start with 1/2 tablet daily, increase to full tablet daily as tolerated) 30 tablet 3 1/21/2025    albuterol 108 (90 Base) MCG/ACT Inhalation Aero Soln Inhale 2 puffs into the lungs every 4 (four) hours as needed. 1 each 1 Taking As Needed    Cholecalciferol (VITAMIN D) 50 MCG (2000 UT) Oral Cap Place 1 capsule (2,000 Units total) onto teeth.   Taking    Cyanocobalamin (B-12 OR) Take 1 tablet by mouth daily.   1/21/2025    ibuprofen 200 MG Oral Tab Take 1 tablet (200 mg total) by mouth every 6 (six) hours as needed for Pain.   Taking As Needed    Multiple Vitamin (MULTIVITAMINS) Oral Cap Take 1 capsule by mouth daily.   Taking   [2] No Known Allergies

## 2025-02-11 ENCOUNTER — OFFICE VISIT (OUTPATIENT)
Dept: PODIATRY CLINIC | Facility: CLINIC | Age: 65
End: 2025-02-11
Payer: MEDICARE

## 2025-02-11 DIAGNOSIS — M19.071 ARTHRITIS OF BOTH MIDFEET: ICD-10-CM

## 2025-02-11 DIAGNOSIS — M79.671 PAIN IN BOTH FEET: Primary | ICD-10-CM

## 2025-02-11 DIAGNOSIS — M19.072 ARTHRITIS OF BOTH MIDFEET: ICD-10-CM

## 2025-02-11 DIAGNOSIS — M79.672 PAIN IN BOTH FEET: Primary | ICD-10-CM

## 2025-02-11 PROCEDURE — 99204 OFFICE O/P NEW MOD 45 MIN: CPT | Performed by: STUDENT IN AN ORGANIZED HEALTH CARE EDUCATION/TRAINING PROGRAM

## 2025-02-11 RX ORDER — MELOXICAM 15 MG/1
15 TABLET ORAL
Qty: 30 TABLET | Refills: 1 | Status: SHIPPED | OUTPATIENT
Start: 2025-02-11

## 2025-02-11 NOTE — PROGRESS NOTES
[facility] Podiatry  Progress Note      Mckenna Lo is a 64 year old female.   Chief Complaint   Patient presents with    Foot Pain     Bilateral feet- painful on top of the right foot- rates pain 10/10 when standing or walking- pain comes and goes- patient wants both feet to be looked at              HPI:     Patient is a pleasant 64-year-old female presents to clinic for bilateral foot evaluation.  Admits to pain across her forefoot and midfoot.  Patient had x-rays of right foot performed which show arthritic joint.  No fractures or dislocations are noted on the x-ray.  Admits to pain with walking or standing.  She relates that she takes Tylenol for arthritis taking steroids some relief.    Allergies: Patient has no known allergies.    Current Outpatient Medications   Medication Sig Dispense Refill    Meloxicam 15 MG Oral Tab Take 1 tablet (15 mg total) by mouth daily with dinner. 30 tablet 1    Aspirin-Caffeine (RIDGE BACK & BODY PAIN EX ST) 500-32.5 MG Oral Tab Take 1 tablet by mouth daily.      Diethylpropion HCl ER 75 MG Oral Tablet 24 Hr Take 1 tablet (75 mg total) by mouth every morning. Start with 1/2 tablet daily, increase to full tablet daily as tolerated (Patient taking differently: Take 1 tablet (75 mg total) by mouth every morning. Start with 1/2 tablet daily, increase to full tablet daily as tolerated) 30 tablet 3    albuterol 108 (90 Base) MCG/ACT Inhalation Aero Soln Inhale 2 puffs into the lungs every 4 (four) hours as needed. 1 each 1    Cholecalciferol (VITAMIN D) 50 MCG (2000 UT) Oral Cap Place 1 capsule (2,000 Units total) onto teeth.      Cyanocobalamin (B-12 OR) Take 1 tablet by mouth daily.      ibuprofen 200 MG Oral Tab Take 1 tablet (200 mg total) by mouth every 6 (six) hours as needed for Pain.      Multiple Vitamin (MULTIVITAMINS) Oral Cap Take 1 capsule by mouth daily.        Past Medical History:    Gigantomastia    Lipoma    Pneumonia due to organism    Screen for colon cancer     repeat CLN in 7 yrs due to hx of polyps    Visual impairment    glasses      Past Surgical History:   Procedure Laterality Date    Ankle fracture surgery Right     Arthroplasty patella Left 2019    Colonoscopy N/A 2019    Procedure: COLONOSCOPY;  Surgeon: OPHELIA Colbert MD;  Location: [facility] ENDOSCOPY    Colonoscopy N/A 2025    Procedure: COLONOSCOPY;  Surgeon: OPHELIA Colbert MD;  Location: [facility] ENDOSCOPY    Other surgical history      left bunion surgery    Reduction of large breast  2008    Breast reduction to reduce back pain      Family History   Problem Relation Age of Onset    Hypertension Father     Cancer Father         liver    Hypertension Sister     Diabetes Sister     Other (Other) Mother         dementia      Social History     Socioeconomic History    Marital status: Single   Tobacco Use    Smoking status: Former     Current packs/day: 0.00     Types: Cigarettes     Quit date:      Years since quittin.1    Smokeless tobacco: Never   Vaping Use    Vaping status: Never Used   Substance and Sexual Activity    Alcohol use: Yes     Comment: occasional    Drug use: No   Other Topics Concern    Caffeine Concern Yes     Comment: Coffee, Occasional, 1 cup daily    Exercise No    Reaction to local anesthetic No           REVIEW OF SYSTEMS:     Denies nause, fever, chills  No calf pain  Denies chest pain or SOB      EXAM:   There were no vitals taken for this visit.  GENERAL: well developed, well nourished, in no apparent distress  EXTREMITIES:   1. Integument: Normal skin temperature and turgor  2. Vascular: Dorsalis pedis two out of four bilateral and posterior tibial pulses two out of   four bilateral, capillary refill normal.   3. Musculoskeletal: All muscle groups are graded 5 out of 5 in the foot and ankle.   4. Neurological: Normal sharp dull sensation; reflexes normal.             ASSESSMENT AND PLAN:   Diagnoses and all orders for this visit:    Pain in both feet  -     Cancel: XR  FOOT, COMPLETE (MIN 3 VIEWS), RIGHT (CPT=73630); Future  -     Cancel: XR FOOT, COMPLETE (MIN 3 VIEWS), LEFT (CPT=73630); Future    Arthritis of both midfeet    Other orders  -     Meloxicam 15 MG Oral Tab; Take 1 tablet (15 mg total) by mouth daily with dinner.        Plan: Please    Discussed the importance of supportive shoe gear and limited barefoot walking.  Discussed water aerobic for exercise.   Discussed right foot xrays with patient in detail  Avoid walking barefoot  Discussed possible steroid injection.  Injection series:   Discussed possible oral steroids if patient is not diabetic, otherwise use of NSAIDS if no history of GERD or stomach upset.  Recommend cryotherapy/icing.  Discussed the importance of rest and the need for immobilization.  Recommend use of compression stockings vs. ACE wrap to control edema/swelling.      The patient indicates understanding of these issues and agrees to the plan.        Luzmaria Jackson DPM

## 2025-02-17 ENCOUNTER — HOSPITAL ENCOUNTER (OUTPATIENT)
Dept: BONE DENSITY | Facility: HOSPITAL | Age: 65
Discharge: HOME | End: 2025-02-17
Attending: FAMILY MEDICINE
Payer: MEDICARE

## 2025-02-17 DIAGNOSIS — E28.39 ESTROGEN DEFICIENCY: ICD-10-CM

## 2025-02-17 PROCEDURE — 77080 DXA BONE DENSITY AXIAL: CPT | Performed by: FAMILY MEDICINE

## 2025-03-10 ENCOUNTER — HOSPITAL ENCOUNTER (OUTPATIENT)
Dept: MAMMOGRAPHY | Age: 65
Discharge: HOME | End: 2025-03-10
Attending: FAMILY MEDICINE
Payer: MEDICARE

## 2025-03-10 DIAGNOSIS — Z12.31 ENCOUNTER FOR SCREENING MAMMOGRAM FOR BREAST CANCER: ICD-10-CM

## 2025-03-10 PROCEDURE — 77063 BREAST TOMOSYNTHESIS BI: CPT | Performed by: FAMILY MEDICINE

## 2025-03-10 PROCEDURE — 77067 SCR MAMMO BI INCL CAD: CPT | Performed by: FAMILY MEDICINE

## 2025-03-11 ENCOUNTER — TELEPHONE (OUTPATIENT)
Dept: FAMILY MEDICINE CLINIC | Facility: CLINIC | Age: 65
End: 2025-03-11

## 2025-04-02 ENCOUNTER — TELEPHONE (OUTPATIENT)
Dept: FAMILY MEDICINE CLINIC | Facility: CLINIC | Age: 65
End: 2025-04-02

## 2025-04-16 ENCOUNTER — PATIENT MESSAGE (OUTPATIENT)
Dept: FAMILY MEDICINE CLINIC | Facility: CLINIC | Age: 65
End: 2025-04-16

## 2025-04-23 RX ORDER — MELOXICAM 15 MG/1
15 TABLET ORAL
Qty: 30 TABLET | Refills: 1 | Status: SHIPPED | OUTPATIENT
Start: 2025-04-23

## 2025-04-23 NOTE — TELEPHONE ENCOUNTER
Dr Weiler: please review. Can patient cancel her appointment today? It appears she is scheduled for additional views at 11am.     Chart reviewed.   An addendum was made from the comparisons of external mammograms.   Normal mammogram.

## 2025-04-23 NOTE — TELEPHONE ENCOUNTER
Per collobaration with Dr Weiler, no additional views needed. Patient can cancel appointment.     Patient contacted and explained she no longer needs appointment today. I conferenced her in with central scheduler; we spoke to Rosalie to cancel her appointment today. No \"late cancelling charge\" should be applied.     I cancelled out the order.

## 2025-05-15 DIAGNOSIS — E66.01 MORBID OBESITY WITH BMI OF 45.0-49.9, ADULT (HCC): ICD-10-CM

## 2025-05-15 RX ORDER — DIETHYLPROPION HYDROCHLORIDE 75 MG/1
1 TABLET, EXTENDED RELEASE ORAL EVERY MORNING
Qty: 30 TABLET | Refills: 3 | OUTPATIENT
Start: 2025-05-15

## 2025-05-18 ENCOUNTER — PATIENT MESSAGE (OUTPATIENT)
Dept: FAMILY MEDICINE CLINIC | Facility: CLINIC | Age: 65
End: 2025-05-18

## 2025-05-18 DIAGNOSIS — E66.813 CLASS 3 SEVERE OBESITY WITHOUT SERIOUS COMORBIDITY WITH BODY MASS INDEX (BMI) OF 40.0 TO 44.9 IN ADULT, UNSPECIFIED OBESITY TYPE: Primary | ICD-10-CM

## 2025-05-19 DIAGNOSIS — E66.01 MORBID OBESITY WITH BMI OF 45.0-49.9, ADULT (HCC): ICD-10-CM

## 2025-05-19 RX ORDER — DIETHYLPROPION HYDROCHLORIDE 75 MG/1
1 TABLET, EXTENDED RELEASE ORAL EVERY MORNING
Qty: 30 TABLET | Refills: 0 | Status: SHIPPED | OUTPATIENT
Start: 2025-05-19

## 2025-05-19 NOTE — TELEPHONE ENCOUNTER
[employer] message sent to patient, await reply.   Requested referral is pended.       Future Appointments   Date Time Provider Department Center   7/10/2025  2:30 PM Weiler, Colleen M, DO [facility]   8/13/2025 10:40 AM Rosalie Mathis APRN [id number] [place] [address]

## 2025-06-23 ENCOUNTER — OFFICE VISIT (OUTPATIENT)
Dept: SURGERY | Facility: CLINIC | Age: 65
End: 2025-06-23
Payer: COMMERCIAL

## 2025-06-23 VITALS
BODY MASS INDEX: 44.57 KG/M2 | DIASTOLIC BLOOD PRESSURE: 88 MMHG | OXYGEN SATURATION: 96 % | HEIGHT: 63.7 IN | SYSTOLIC BLOOD PRESSURE: 122 MMHG | HEART RATE: 96 BPM | WEIGHT: 257.88 LBS

## 2025-06-23 DIAGNOSIS — E66.01 MORBID OBESITY WITH BMI OF 45.0-49.9, ADULT (HCC): ICD-10-CM

## 2025-06-23 DIAGNOSIS — Z51.81 ENCOUNTER FOR THERAPEUTIC DRUG MONITORING: Primary | ICD-10-CM

## 2025-06-23 DIAGNOSIS — R73.03 PREDIABETES: ICD-10-CM

## 2025-06-23 PROCEDURE — 99213 OFFICE O/P EST LOW 20 MIN: CPT | Performed by: NURSE PRACTITIONER

## 2025-06-23 RX ORDER — DIETHYLPROPION HYDROCHLORIDE 75 MG/1
1 TABLET, EXTENDED RELEASE ORAL EVERY MORNING
Qty: 30 TABLET | Refills: 3 | Status: SHIPPED | OUTPATIENT
Start: 2025-06-23

## 2025-06-23 NOTE — PATIENT INSTRUCTIONS
I recommend a whole food, plant powered diet with low glycemic index:     Aim for 3 meals a day and 1-2 snacks as needed.    Aim for a protein + produce at each meal time.     Keep meals between 7 am and 7 pm.     Breakfast ideas:  1. Fruit and nuts/seeds.  2. Eggs scrambled with vegetables.  3. Oatmeal (stovetop), cinnamon, 2 tbsp flaxseed, berries, nuts.  4. Protein shake + fruit. (1 scoop with water/ice). Garden of Life Fit, Nutiva, Solitario, and Orgain are good brands.      Snacks:  Raw vegetables and hummus, apples and peanut butter, nuts, seeds, fruit, pecans drizzled with organic honey.      Use the Healthy Plate method for lunch and dinner:  1/2 right side of plate non-starchy vegetables.  Bottom left 1/4 plate protein.  Top left 1/4 starch as desired.      Aim for a total of:  2 fruits a day (avocado, tomato, citrus/oranges, apples, berries)  1/2 cup or medium size    4 non-starchy vegetables (handful) (greens, peppers, onions, garlic, broccoli, cauliflower, etc.)    0-2 starches (oatmeal, sweet potato, carrots, brown rice, etc).    3 protein per day (fish, seafood, meat, or plants: salmon, nuts, seeds, shrimp, chicken, turkey, beans, lentils, chickpeas, etc).    2 healthy fats (avocado, avocado oil, olives, olive oil, salmon, nuts, seeds)
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
## 2025-07-02 RX ORDER — MELOXICAM 15 MG/1
15 TABLET ORAL
Qty: 30 TABLET | Refills: 1 | Status: SHIPPED | OUTPATIENT
Start: 2025-07-02

## 2025-07-10 ENCOUNTER — OFFICE VISIT (OUTPATIENT)
Dept: FAMILY MEDICINE CLINIC | Facility: CLINIC | Age: 65
End: 2025-07-10
Payer: MEDICARE

## 2025-07-10 ENCOUNTER — LAB ENCOUNTER (OUTPATIENT)
Dept: LAB | Age: 65
End: 2025-07-10
Attending: FAMILY MEDICINE
Payer: MEDICARE

## 2025-07-10 VITALS
BODY MASS INDEX: 45.18 KG/M2 | HEART RATE: 80 BPM | HEIGHT: 63 IN | DIASTOLIC BLOOD PRESSURE: 70 MMHG | OXYGEN SATURATION: 92 % | SYSTOLIC BLOOD PRESSURE: 128 MMHG | WEIGHT: 255 LBS

## 2025-07-10 DIAGNOSIS — R20.2 BILATERAL ARM NUMBNESS AND TINGLING WHILE SLEEPING: ICD-10-CM

## 2025-07-10 DIAGNOSIS — Z00.00 ENCOUNTER FOR SUBSEQUENT ANNUAL WELLNESS VISIT (AWV) IN MEDICARE PATIENT: ICD-10-CM

## 2025-07-10 DIAGNOSIS — R73.03 PREDIABETES: ICD-10-CM

## 2025-07-10 DIAGNOSIS — Z00.00 ENCOUNTER FOR SUBSEQUENT ANNUAL WELLNESS VISIT (AWV) IN MEDICARE PATIENT: Primary | ICD-10-CM

## 2025-07-10 DIAGNOSIS — G47.33 MODERATE OBSTRUCTIVE SLEEP APNEA: ICD-10-CM

## 2025-07-10 DIAGNOSIS — E66.01 MORBID OBESITY WITH BMI OF 45.0-49.9, ADULT (HCC): ICD-10-CM

## 2025-07-10 DIAGNOSIS — R20.0 BILATERAL ARM NUMBNESS AND TINGLING WHILE SLEEPING: ICD-10-CM

## 2025-07-10 DIAGNOSIS — K57.30 DIVERTICULA OF COLON: ICD-10-CM

## 2025-07-10 PROBLEM — Z51.81 ENCOUNTER FOR THERAPEUTIC DRUG MONITORING: Status: RESOLVED | Noted: 2024-08-21 | Resolved: 2025-07-10

## 2025-07-10 PROBLEM — K64.8 INTERNAL HEMORRHOIDS: Status: RESOLVED | Noted: 2025-01-28 | Resolved: 2025-07-10

## 2025-07-10 LAB
ALBUMIN SERPL-MCNC: 4.5 G/DL (ref 3.2–4.8)
ALBUMIN/GLOB SERPL: 1.5 {RATIO} (ref 1–2)
ALP LIVER SERPL-CCNC: 61 U/L (ref 55–142)
ALT SERPL-CCNC: 15 U/L (ref 10–49)
ANION GAP SERPL CALC-SCNC: 9 MMOL/L (ref 0–18)
AST SERPL-CCNC: 16 U/L (ref ?–34)
BILIRUB SERPL-MCNC: 0.3 MG/DL (ref 0.2–1.1)
BUN BLD-MCNC: 18 MG/DL (ref 9–23)
BUN/CREAT SERPL: 23.4 (ref 10–20)
CALCIUM BLD-MCNC: 9.2 MG/DL (ref 8.7–10.4)
CHLORIDE SERPL-SCNC: 105 MMOL/L (ref 98–112)
CHOLEST SERPL-MCNC: 153 MG/DL (ref ?–200)
CO2 SERPL-SCNC: 25 MMOL/L (ref 21–32)
CREAT BLD-MCNC: 0.77 MG/DL (ref 0.55–1.02)
DEPRECATED RDW RBC AUTO: 48.1 FL (ref 35.1–46.3)
EGFRCR SERPLBLD CKD-EPI 2021: 85 ML/MIN/1.73M2 (ref 60–?)
ERYTHROCYTE [DISTWIDTH] IN BLOOD BY AUTOMATED COUNT: 15 % (ref 11–15)
EST. AVERAGE GLUCOSE BLD GHB EST-MCNC: 134 MG/DL (ref 68–126)
FASTING PATIENT LIPID ANSWER: NO
FASTING STATUS PATIENT QL REPORTED: NO
GLOBULIN PLAS-MCNC: 3 G/DL (ref 2–3.5)
GLUCOSE BLD-MCNC: 77 MG/DL (ref 70–99)
HBA1C MFR BLD: 6.3 % (ref ?–5.7)
HCT VFR BLD AUTO: 39.4 % (ref 35–48)
HDLC SERPL-MCNC: 54 MG/DL (ref 40–59)
HGB BLD-MCNC: 12.4 G/DL (ref 12–16)
LDLC SERPL CALC-MCNC: 88 MG/DL (ref ?–100)
MCH RBC QN AUTO: 27.6 PG (ref 26–34)
MCHC RBC AUTO-ENTMCNC: 31.5 G/DL (ref 31–37)
MCV RBC AUTO: 87.8 FL (ref 80–100)
NONHDLC SERPL-MCNC: 99 MG/DL (ref ?–130)
OSMOLALITY SERPL CALC.SUM OF ELEC: 289 MOSM/KG (ref 275–295)
PLATELET # BLD AUTO: 354 10(3)UL (ref 150–450)
POTASSIUM SERPL-SCNC: 3.7 MMOL/L (ref 3.5–5.1)
PROT SERPL-MCNC: 7.5 G/DL (ref 5.7–8.2)
RBC # BLD AUTO: 4.49 X10(6)UL (ref 3.8–5.3)
SODIUM SERPL-SCNC: 139 MMOL/L (ref 136–145)
TRIGL SERPL-MCNC: 50 MG/DL (ref 30–149)
TSI SER-ACNC: 1.42 UIU/ML (ref 0.55–4.78)
VLDLC SERPL CALC-MCNC: 8 MG/DL (ref 0–30)
WBC # BLD AUTO: 5.4 X10(3) UL (ref 4–11)

## 2025-07-10 PROCEDURE — 84443 ASSAY THYROID STIM HORMONE: CPT

## 2025-07-10 PROCEDURE — 80061 LIPID PANEL: CPT

## 2025-07-10 PROCEDURE — 85027 COMPLETE CBC AUTOMATED: CPT

## 2025-07-10 PROCEDURE — 83036 HEMOGLOBIN GLYCOSYLATED A1C: CPT

## 2025-07-10 PROCEDURE — 80053 COMPREHEN METABOLIC PANEL: CPT

## 2025-07-10 PROCEDURE — 36415 COLL VENOUS BLD VENIPUNCTURE: CPT

## 2025-07-10 NOTE — PROGRESS NOTES
Subjective:   Mckenna Lo is a 65 year old female who presents for a MA AHA (Medicare Advantage Annual Health Assessment) and Subsequent Annual Wellness visit (Pt already had Initial Annual Wellness) and scheduled follow up of multiple significant but stable problems.   History of Present Illness  Mckenna Lo is a 65 year old female who presents for follow-up visit and management of her allergies and joint pain.    She experiences persistent nasal congestion and a sensation of 'talking out of my nose,' which she attributes to allergies. She has not been taking any medication for her allergies. No other significant allergy symptoms are present.    She occasionally uses a cane, particularly on uneven surfaces, to prevent falls but has not been using it consistently and reports no recent falls. Due to her 's illness, she has not been able to attend her exercise classes but plans to resume swimming classes at the [facility]. She has a membership and intends to participate in water aerobics.    She has a history of sleep apnea and previously completed a home sleep study. She has not yet seen a pulmonologist due to a lack of referral. She experiences her arm going numb at night, which sometimes causes pain. She was previously prescribed a brace by a neurologist but has not used it.    She is currently under the care of a bariatric clinic and is taking diethylpropion, which she tolerates well. She started with a half tablet and is now on a full tablet. She has not been able to attend her weight loss classes recently due to her 's illness.    Her joint pain, particularly in the knee where she tore her patellar tendon, persists. She occasionally takes Lagicam for pain relief but does not use it regularly. She avoids wearing heels due to knee pain and opts for flats instead.    She underwent a colonoscopy, mammogram, and DEXA scan earlier this year.    She reports dry eyes and uses eye drops for relief. No chest  pain, shortness of breath, urinary issues, or blood in her stool.      History/Other:   Fall Risk Assessment:   She has been screened for Falls and is low risk.      Cognitive Assessment:   She had a completely normal cognitive assessment - see flowsheet entries     Functional Ability/Status:   Mckenna Lo has some abnormal functions as listed below:  She has Vision problems based on screening of functional status. She has Walking problems based on screening of functional status.       Depression Screening (PHQ):  PHQ-2 SCORE: 0  , done 7/10/2025   Last [place] Suicide Screening on 7/10/2025 was No Risk.          Advanced Directives:   She does NOT have a Living Will. [ ]  She does NOT have a Power of  for Health Care. [ ]  Discussed Advance Care Planning with patient (and family/surrogate if present). Standard forms made available to patient in After Visit Summary.      Patient Active Problem List   Diagnosis    Morbid obesity with BMI of 45.0-49.9, adult (HCC)    Diverticula of colon    Prediabetes     Allergies:  She has no known allergies.    Current Medications:  Outpatient Medications Marked as Taking for the 7/10/25 encounter (Office Visit) with Weiler, Colleen M, DO   Medication Sig    MELOXICAM 15 MG Oral Tab TAKE 1 TABLET BY MOUTH DAILY WITH DINNER.    Diethylpropion HCl ER 75 MG Oral Tablet 24 Hr Take 1 tablet (75 mg total) by mouth every morning. Start with 1/2 tablet daily, increase to full tablet daily as tolerated    Aspirin-Caffeine (RIDGE BACK & BODY PAIN EX ST) 500-32.5 MG Oral Tab Take 1 tablet by mouth daily.    albuterol 108 (90 Base) MCG/ACT Inhalation Aero Soln Inhale 2 puffs into the lungs every 4 (four) hours as needed.    Cholecalciferol (VITAMIN D) 50 MCG (2000 UT) Oral Cap Place 1 capsule (2,000 Units total) onto teeth.    Cyanocobalamin (B-12 OR) Take 1 tablet by mouth daily.    ibuprofen 200 MG Oral Tab Take 1 tablet (200 mg total) by mouth every 6 (six) hours as needed for  Pain.    Multiple Vitamin (MULTIVITAMINS) Oral Cap Take 1 capsule by mouth daily.       Medical History:  She  has a past medical history of Gigantomastia, Lipoma, Pneumonia due to organism (), Screen for colon cancer (), and Visual impairment.  Surgical History:  She  has a past surgical history that includes reduction of large breast (); other surgical history; ankle fracture surgery (Right); arthroplasty patella (Left, ); colonoscopy (N/A, 2019); colonoscopy (N/A, 2025); reduction left; reduction right; and needle biopsy left ().   Family History:  Her family history includes Cancer in her father; Diabetes in her sister; Hypertension in her father and sister; Other in her mother.  Social History:  She  reports that she quit smoking about 20 years ago. Her smoking use included cigarettes. She has never used smokeless tobacco. She reports current alcohol use. She reports that she does not use drugs.    Tobacco:  She smoked tobacco in the past but quit greater than 12 months ago.  Social History     Tobacco Use   Smoking Status Former    Current packs/day: 0.00    Types: Cigarettes    Quit date:     Years since quittin.5   Smokeless Tobacco Never        CAGE Alcohol Screen:   CAGE screening score of 0 on 7/3/2025, showing low risk of alcohol abuse.      Patient Care Team:  Weiler, Colleen M, DO as PCP - General (Family Medicine)  Cedrick Yin DO as Consulting Physician (Physical Medicine)  Rex Groves, PT as Physical Therapist (Physical Therapy)  Rian Rubio, PT as Physical Therapist (Physical Therapy)    Review of Systems  See HPI    Objective:   Physical Exam       /70   Pulse 80   Ht 5' 3\" (1.6 m)   Wt 255 lb (115.7 kg)   SpO2 92%   BMI 45.17 kg/m²  Estimated body mass index is 45.17 kg/m² as calculated from the following:    Height as of this encounter: 5' 3\" (1.6 m).    Weight as of this encounter: 255 lb (115.7 kg).    Medicare Hearing Assessment:    Hearing Screening    Time taken: 7/10/2025  2:18 PM  Entry User: Mi Taylor CMA  Screening Method: Finger Rub  Finger Rub Result: Pass         Visual Acuity:   Right Eye Visual Acuity: Corrected Right Eye Chart Acuity: 20/40   Left Eye Visual Acuity: Corrected Left Eye Chart Acuity: 20/30   Both Eyes Visual Acuity: Corrected Both Eyes Chart Acuity: 20/20   Able To Tolerate Visual Acuity: Yes      Gen:  Well-nourished.  No distress.  HEENT: Conjunctive clear.  Ricardo ear canals clear.  Ricardo TMs intact with good landmarks noted.  Nares patent.  Oral mucous membrane moist.  Normal lips, teeth, and gums.  Oropharynx normal.  Neck supple.  Good ROM.  No LAD.  Thyroid normal.  CV:  Regular rate and rhythm; no murmurs  Lungs:  Clear to ausculation; good aeration               No wheezes, rales or rhonchi  Abd: soft, non-tender, non-distended          Normal bowel sounds; no masses          No hepatosplenomegaly  Breasts:  Normal appearance bilateral.  No masses or lesions noted.  Normal nipples bilateral.  No nipple discharge noted.  Normal lymph nodes.  Extremities: No cyanosis, clubbing, edema.  Pedal pulses 2+ ricardo.        Assessment & Plan:   Mckenna Lo is a 65 year old female who presents for a Medicare Assessment.     1. Encounter for subsequent annual wellness visit (AWV) in Medicare patient (Primary)  -     CBC, Platelet; No Differential; Future; Expected date: 07/10/2025  -     Comp Metabolic Panel (14); Future; Expected date: 07/10/2025  -     Lipid Panel; Future; Expected date: 07/10/2025  -     Assay, Thyroid Stim Hormone; Future; Expected date: 07/10/2025  -     Hemoglobin A1C; Future; Expected date: 07/10/2025  2. Moderate obstructive sleep apnea    Refer to Pulmonary  -     Pulmonary Referral - In Network    3. Bilateral arm numbness and tingling while sleeping    Unclear etiology. Refer back to Neurology  -     Neuro Referral - [facility] ([facility])    4. Morbid obesity with BMI of 45.0-49.9, adult (HCC)     Working with Bariatric clinic    5. Prediabetes    Check hemoglobin A1C    6. Diverticula of colon    Encourage high fiber diet.  Other orders  -     Prevnar 20 (PCV20) [89203]    Assessment & Plan  Allergic Rhinitis  Persistent nasal congestion and sensation of talking through the nose, likely due to allergies. Symptoms have been significant this year, possibly exacerbated by environmental factors such as smoke. She is not currently taking any medication for allergies.  - Start Flonase for nasal congestion.  - Consider Zyrtec or Claritin if additional allergy symptoms occur.    Sleep Apnea  Sleep apnea with previous home sleep study. Referral to pulmonologist was not completed previously.  - Refer to pulmonologist for further evaluation.    Arm Paresthesia  Intermittent arm numbness and pain at night, previously evaluated by neurologist Dr. Crawley. A brace was prescribed but not obtained. She was unaware of the prescription and did not follow up with Dr. Crawley.  - Refer to neurologist Dr. Crawley for follow-up evaluation.  - Discuss obtaining prescribed brace for nighttime use.    Obesity  Ongoing management at the bariatric clinic. Currently on diethylpropion, which is well tolerated. Plans to resume water aerobics for weight management and joint health. She has not been able to attend classes recently due to her 's illness but plans to resume.  - Continue diethylpropion as prescribed.  - Encourage resumption of water aerobics for weight management and joint health.    Knee Pain  Chronic knee pain, possibly related to previous patellar tendon injury. Pain exacerbated by certain footwear. Weight loss may improve symptoms. She is unable to wear heels and opts for flats to minimize pain.  - Encourage weight loss to alleviate knee pain.  - Advise wearing appropriate footwear to minimize pain.    General Health Maintenance  Up to date with colonoscopy, mammogram, and DEXA scan. Blood pressure was initially  elevated but improved upon recheck. Discussed dry eyes likely due to allergies. She is interested in receiving the pneumonia vaccine to prevent illness, especially given her frequent hospital visits.  - Administer pneumonia vaccine.  - Perform lab work today.  - Recheck blood pressure during visit.  - Advise on proper technique for home blood pressure monitoring.  - Continue using eye drops for dry eyes.    Follow-up  Plans for follow-up care and additional evaluations.  - Follow up with bariatric clinic in October.  - Obtain EKG at [address] as a walk-in.  - Encourage regular exercise to manage stress and overall health.    The patient indicates understanding of these issues and agrees to the plan.  Reinforced healthy diet, lifestyle, and exercise.        Return in 1 year (on 7/10/2026).     Colleen Weiler, DO, 7/10/2025     Supplementary Documentation:   General Health:  In the past six months, have you lost more than 10 pounds without trying?: (Patient-Rptd) 2 - No  Has your appetite been poor?: (Patient-Rptd) No  Type of Diet: (Patient-Rptd) Balanced, Low Salt, Low Carb  How does the patient maintain a good energy level?: (Patient-Rptd) Daily Walks  How would you describe your daily physical activity?: (Patient-Rptd) Moderate  How would you describe your current health state?: (Patient-Rptd) Good  How do you maintain positive mental well-being?: (Patient-Rptd) Social Interaction, Puzzles, Games, Visiting Friends, Visiting Family  On a scale of 0 to 10, with 0 being no pain and 10 being severe pain, what is your pain level?: (Patient-Rptd) 3 - (Mild)  In the past six months, have you experienced urine leakage?: (Patient-Rptd) 0-No  At any time do you feel concerned for the safety/well-being of yourself and/or your children, in your home or elsewhere?: No  Have you had any immunizations at another office such as Influenza, Hepatitis B, Tetanus, or Pneumococcal?: (Patient-Rptd) No    Health Maintenance   Topic Date  Due    Pneumococcal Vaccine: 50+ Years (1 of 1 - PCV) Never done    COVID-19 Vaccine (6 - 2024-25 season) 09/01/2024    Annual Well Visit  01/01/2025    Mammogram  09/10/2025    HTN: BP Follow-Up  08/10/2025    Influenza Vaccine (1) 10/01/2025    Pap Smear  08/08/2028    Colorectal Cancer Screening  01/28/2032    DEXA Scan  Completed    Annual Depression Screening  Completed    Fall Risk Screening (Annual)  Completed    Zoster Vaccines  Completed    Meningococcal B Vaccine  Aged Out

## (undated) DEVICE — TETRA-FLEX CF WOVEN LATEX FREE ELASTIC BANDAGE 6" X 5.5 YD: Brand: TETRA-FLEX™CF

## (undated) DEVICE — MEDI-VAC NON-CONDUCTIVE SUCTION TUBING 6MM X 1.8M (6FT.) L: Brand: CARDINAL HEALTH

## (undated) DEVICE — KIT CLEAN ENDOKIT 1.1OZ GOWNX2

## (undated) DEVICE — SOL  .9 1000ML BTL

## (undated) DEVICE — DRILL BIT SYNT 3.5X110 310.35

## (undated) DEVICE — INTENDED FOR TISSUE SEPARATION, AND OTHER PROCEDURES THAT REQUIRE A SHARP SURGICAL BLADE TO PUNCTURE OR CUT.: Brand: BARD-PARKER ® STAINLESS STEEL BLADES

## (undated) DEVICE — DRILL BIT SYNT 2.0X125 310.21

## (undated) DEVICE — SUTURE VICRYL 2-0 FS-1

## (undated) DEVICE — COVER SGL STRL LGHT HNDL BLU

## (undated) DEVICE — CLIPPER BLADE 3M

## (undated) DEVICE — TOWEL OR BLU 16X26 STRL

## (undated) DEVICE — BATTERY

## (undated) DEVICE — CHLORAPREP 26ML APPLICATOR

## (undated) DEVICE — MASK PROC W/VISOR ANTIGLARE

## (undated) DEVICE — COTTON UNDERCAST PADDING,REGULAR FINISH: Brand: WEBRIL

## (undated) DEVICE — 60 ML SYRINGE REGULAR TIP: Brand: MONOJECT

## (undated) DEVICE — 35 ML SYRINGE REGULAR TIP: Brand: MONOJECT

## (undated) DEVICE — MATTRESS PT HOVERMATT 1/2L 34W

## (undated) DEVICE — TRAY FOLEY BDX 16F STATLOCK

## (undated) DEVICE — IMPERVIOUS STOCKINETTE: Brand: DEROYAL

## (undated) DEVICE — LINE MNTR ADLT SET O2 INTMD

## (undated) DEVICE — Device

## (undated) DEVICE — SOL H2O 1000ML BTL

## (undated) DEVICE — REM POLYHESIVE ADULT PATIENT RETURN ELECTRODE: Brand: VALLEYLAB

## (undated) DEVICE — FLEXIBLE YANKAUER,MEDIUM TIP, NO VACUUM CONTROL: Brand: ARGYLE

## (undated) DEVICE — LOWER EXTREMITY: Brand: MEDLINE INDUSTRIES, INC.

## (undated) DEVICE — C-ARMOR C-ARM EQUIPMENT COVERS CLEAR STERILE UNIVERSAL FIT 12 PER CASE: Brand: C-ARMOR

## (undated) DEVICE — PENCIL ESURG 10FT 3/32IN SS

## (undated) DEVICE — OCCLUSIVE GAUZE STRIP,3% BISMUTH TRIBROMOPHENATE IN PETROLATUM BLEND: Brand: XEROFORM

## (undated) DEVICE — Device: Brand: CUSTOM PROCEDURE KIT

## (undated) DEVICE — ZIMMER® STERILE DISPOSABLE TOURNIQUET CUFF WITH PLC, DUAL PORT, SINGLE BLADDER, 30 IN. (76 CM)

## (undated) DEVICE — HEWSON SUTURE RETRIEVER: Brand: HEWSON SUTURE RETRIEVER

## (undated) DEVICE — SPONGE LAP 18X18 XRAY STRL

## (undated) DEVICE — DRAPE C-ARM UNIVERSAL

## (undated) DEVICE — SUTURE VICRYL 0 CP-1

## (undated) DEVICE — INTENT TO BE USED WITH SUTURE MATERIAL FOR TISSUE CLOSURE: Brand: RICHARD-ALLAN® NEEDLE 3/8 CIRCLE TROCAR

## (undated) DEVICE — ZIMMER® STERILE DISPOSABLE TOURNIQUET CUFF WITH PLC, DUAL PORT, SINGLE BLADDER, 42 IN. (107 CM)

## (undated) DEVICE — DRAPE SHEET LG

## (undated) DEVICE — ENCORE® LATEX ACCLAIM SIZE 8.5, STERILE LATEX POWDER-FREE SURGICAL GLOVE: Brand: ENCORE

## (undated) DEVICE — V2 SPECIMEN COLLECTION MANIFOLD KIT: Brand: NEPTUNE

## (undated) DEVICE — KIT ENDO ORCAPOD 160/180/190

## (undated) DEVICE — KNEE IMMOBILIZER: Brand: DEROYAL

## (undated) DEVICE — SUTURE ETHILON 3-0 669H

## (undated) DEVICE — DRILL BIT SYNT 2.5X110 310.25

## (undated) DEVICE — DRAPE SRG 70X60IN SPLT U IMPRV

## (undated) NOTE — LETTER
AUTHORIZATION FOR SURGICAL OPERATION OR OTHER PROCEDURE    1.  I hereby authorize Dr. Tori Corona and the [facility] staff assigned to my case to perform the following operation and/or procedure at the [facility]:     Ultrasound guided CSI left greater troc Relationship to Patient:           []  Parent    Responsible person                          []  Spouse  In case of minor or                    [] Other  _____________   Incompetent name:  __________________________________________________

## (undated) NOTE — LETTER
[facility]  [address], [place], [place]    Authorization for Surgical Operation and Procedure                               I hereby authorize OPHELIA Colbert MD, my physician and his/her assistants (if applicable), which may include medical students, residents, and/or fellows, to perform the following surgical operation/ procedure and administer such anesthesia as may be determined necessary by my physician: Operation/Procedure name (s) COLONOSCOPY on Mckenna Lo   2.   I recognize that during the surgical operation/procedure, unforeseen conditions may necessitate additional or different procedures than those listed above.  I, therefore, further authorize and request that the above-named surgeon, assistants, or designees perform such procedures as are, in their judgment, necessary and desirable.    3.   My surgeon/physician has discussed prior to my surgery the potential benefits, risks and side effects of this procedure; the likelihood of achieving goals; and potential problems that might occur during recuperation.  They also discussed reasonable alternatives to the procedure, including risks, benefits, and side effects related to the alternatives and risks related to not receiving this procedure.  I have had all my questions answered and I acknowledge that no guarantee has been made as to the result that may be obtained.    4.   Should the need arise during my operation/procedure, which includes change of level of care prior to discharge, I also consent to the administration of blood and/or blood products.  Further, I understand that despite careful testing and screening of blood or blood products by collecting agencies, I may still be subject to ill effects as a result of receiving a blood transfusion and/or blood products.  The following are some, but not all, of the potential risks that can occur: fever and allergic reactions, hemolytic reactions, transmission of diseases such as  Hepatitis, AIDS and Cytomegalovirus (CMV) and fluid overload.  In the event that I wish to have an autologous transfusion of my own blood, or a directed donor transfusion, I will discuss this with my physician.  Check only if Refusing Blood or Blood Products  I understand refusal of blood or blood products as deemed necessary by my physician may have serious consequences to my condition to include possible death. I hereby assume responsibility for my refusal and release the hospital, its personnel, and my physicians from any responsibility for the consequences of my refusal.    o  Refuse   5.   I authorize the use of any specimen, organs, tissues, body parts or foreign objects that may be removed from my body during the operation/procedure for diagnosis, research or teaching purposes and their subsequent disposal by hospital authorities.  I also authorize the release of specimen test results and/or written reports to my treating physician on the hospital medical staff or other referring or consulting physicians involved in my care, at the discretion of the Pathologist or my treating physician.    6.   I consent to the photographing or videotaping of the operations or procedures to be performed, including appropriate portions of my body for medical, scientific, or educational purposes, provided my identity is not revealed by the pictures or by descriptive texts accompanying them.  If the procedure has been photographed/videotaped, the surgeon will obtain the original picture, image, videotape or CD.  The hospital will not be responsible for storage, release or maintenance of the picture, image, tape or CD.    7.   I consent to the presence of a  or observers in the operating room as deemed necessary by my physician or their designees.    8.   I recognize that in the event my procedure results in extended X-Ray/fluoroscopy time, I may develop a skin reaction.    9. If I have a Do Not Attempt  Resuscitation (DNAR) order in place, that status will be suspended while in the operating room, procedural suite, and during the recovery period unless otherwise explicitly stated by me (or a person authorized to consent on my behalf). The surgeon or my attending physician will determine when the applicable recovery period ends for purposes of reinstating the DNAR order.  10. Patients having a sterilization procedure: I understand that if the procedure is successful the results will be permanent and it will therefore be impossible for me to inseminate, conceive, or bear children.  I also understand that the procedure is intended to result in sterility, although the result has not been guaranteed.   11. I acknowledge that my physician has explained sedation/analgesia administration to me including the risk and benefits I consent to the administration of sedation/analgesia as may be necessary or desirable in the judgment of my physician.    I CERTIFY THAT I HAVE READ AND FULLY UNDERSTAND THE ABOVE CONSENT TO OPERATION and/or OTHER PROCEDURE.     ____________________________________  _________________________________        ______________________________  Signature of Patient    Signature of Responsible Person                Printed Name of Responsible Person                                      ____________________________________  _____________________________                ________________________________  Signature of Witness        Date  Time         Relationship to Patient    STATEMENT OF PHYSICIAN My signature below affirms that prior to the time of the procedure; I have explained to the patient and/or his/her legal representative, the risks and benefits involved in the proposed treatment and any reasonable alternative to the proposed treatment. I have also explained the risks and benefits involved in refusal of the proposed treatment and alternatives to the proposed treatment and have answered the patient's  questions. If I have a significant financial interest in a co-management agreement or a significant financial interest in any product or implant, or other significant relationship used in this procedure/surgery, I have disclosed this and had a discussion with my patient.     _____________________________________________________              _____________________________  (Signature of Physician)                                                                                         (Date)                                   (Time)  Patient Name: Mckenna Lo      : 3/30/1960      Printed: 2025     Medical Record #: [id number]                                      Page 1 of 1

## (undated) NOTE — LETTER
4/19/2019              Lazarus Black        [facility] [id number]         To Whom It May Concern,    Lazarus Black is currently under my medical care. Luz Marina Chicas may start working from home starting on May 6th.   If you have any que

## (undated) NOTE — IP AVS SNAPSHOT
Patient Demographics     Address  [address] Phone  [phone] [facility]) *Preferred*  [phone] (Work)  [phone] [facility]) E-mail Address  [email or web address]      Emergency Contact(s)     Name Relation Home Work Mobile    [place] omega-3 fatty acids 1000 MG Caps  Commonly known as:  FISH OIL  Next dose due:  TOMORROW MORNING      Take  by mouth. PEG 3350 Pack  Commonly known as:  MIRALAX  Next dose due:  TOMORROW MORNING      Take 17 g by mouth daily.    Karen Reinoso MD Order ID Medication Name Action Time Action Reason Comments    [id number] Enoxaparin Sodium (LOVENOX) 30 MG/0.3ML injection 30 mg 03/10/19 2051 Given      [id number] Enoxaparin Sodium (LOVENOX) 30 MG/0.3ML injection 30 mg 03/11/19 0805 Given              Re outpatient. A CT scan of the right ankle shows a displaced right distal fibula fracture and nondisplaced posterior malleolus fracture. No avulsion fracture of the medial malleolus. Patient elected to undergo surgery.   Therefore, she was admitted for jane MEDICATIONS:  Personally reviewed and include aspirin, docusate, Lovenox, and Pepcid. ALLERGIES:  No known allergies. SOCIAL HISTORY:  Former smoker, quit in 2005. Occasional wine use. No illicit drug use.      FAMILY HISTORY:  Father, hypertension Echo:  Wall thickness, increase in pattern of mild left ventricular hypertrophy. Systolic function normal.  Estimated EF was 65%. Wall motion was normal.  No regional wall motion abnormalities. ASSESSMENT:    1.    Activities of daily living mobility d Author:  Vanessa Daniel MD Service:  Petty Ramírez Type:  Physician    Filed:  3/11/2019 12:12 PM Date of Service:  3/11/2019 12:08 PM Status:  Signed    :  Vanessa Daniel MD (Physician)         [facility]    Discharge Summary    [facility] a CT scan of the right ankle and comes in to discuss results. The patient has been wearing her cam walker. The patient also complains of left knee pain that she sustained during her fall.   The patient and her  reports she is unable to extend her k Commonly known as:  LOVENOX      Inject 0.4 mL (40 mg total) into the skin daily. Quantity:  30 Syringe  Refills:  0     MULTIVITAMINS Caps      Take  by mouth.    Refills:  0     omega-3 fatty acids 1000 MG Caps  Commonly known as:  FISH OIL      Take  b Physical Therapy Notes (last 72 hours) (Notes from 3/8/2019  3:55 PM through 3/11/2019  3:55 PM)      Physical Therapy Note signed by Harrison Silverio at 3/11/2019  9:23 AM  Version 1 of 1    Author:  Harrison Silverio Service:  Rehab Author Type: OBJECTIVE  Precautions: (L LE knee immobilizer)    WEIGHT BEARING RESTRICTION  Weight Bearing Restriction: L lower extremity        R Lower Extremity: Non-Weight Bearing  L Lower Extremity: (L LE WBAT with knee immobilzer)    PAIN ASSESSMENT   Rating: (Did Patient Goal Patient's self-stated goal is: to go to rehab   Goal #1 Patient is able to demonstrate supine - sit EOB @ level: modified independent      Goal #1   Current Status Supine to sit Min A LLE   Goal #2 Patient is able to demonstrate transfers Sit Pt with her KI on at all times. Pt sat EOB for a few minutes with SBA for sitting balance. Pt denied any light headedness or dizziness. Pt is min A with sit<>stand transfers with the RW. Pt is min A with transfers from higher surfaces.  Pt was able to stand How much difficulty does the patient currently have. ..  -   Turning over in bed (including adjusting bedclothes, sheets and blankets)?: A Little   -   Sitting down on and standing up from a chair with arms (e.g., wheelchair, bedside commode, etc.): A Marctl Goal #5 Patient will negotiate 8 stairs/one curb w/ assistive device and supervision   Goal #5   Current Status Not tested   Goal #6 Patient to demonstrate independence with home activity/exercise instructions provided to patient in preparation for dischar impairment may benefit from acute rehab setting to address strength and dynamic balance deficits, in preparation for return home with decreased fall risk.      DISCHARGE RECOMMENDATIONS[KH.1]  PT Discharge Recommendations: Acute rehabilitation[KH.2]     MED Standardized Score (AM-PAC Scale): 35.33   CMS Modifier (G-Code): CL[KH.2]    FUNCTIONAL ABILITY STATUS  Gait Assessment[KH.1]   Gait Assistance: Not tested           Stoop/Curb Assistance: Not tested[KH. 2]       THERAPEUTIC EXERCISES  Lower Extremity L LE No notes of this type exist for this encounter. Video Swallow Study Notes     No notes of this type exist for this encounter. SLP Notes     No notes of this type exist for this encounter.       Immunizations     Name Date      INFLUENZA 11/14/18

## (undated) NOTE — MR AVS SNAPSHOT
[address]  [address]  [phone]               Thank you for choosing us for your health care visit with Iliana Ferro DPM.  We are glad to serve you and happy to provide yo TAKE ONE CAPSULE BY MOUTH EVERY MORNING           Vitamin C 500 MG Chew   Chew  by mouth.    Commonly known as:  VITAMIN C                   MyChart     Visit MyChart  You can access your MyChart to more actively manage your health care and view more detail

## (undated) NOTE — MR AVS SNAPSHOT
[address]  [address]  [phone]               Thank you for choosing us for your health care visit with Maureen Adkins DPM.  We are glad to serve you and happy to provide yo TAKE ONE CAPSULE BY MOUTH EVERY MORNING           Vitamin C 500 MG Chew   Chew  by mouth.    Commonly known as:  VITAMIN C                   MyChart     Visit MyChart  You can access your MyChart to more actively manage your health care and view more detail

## (undated) NOTE — ED AVS SNAPSHOT
Evelio Irvin   MRN: [id number]    Department:  [facility] Emergency Department   Date of Visit:  10/5/2019           Disclosure     Insurance plans vary and the physician(s) referred by the ER may not be covered by your plan.  Please contact you CARE PHYSICIAN AT ONCE OR RETURN IMMEDIATELY TO THE EMERGENCY DEPARTMENT. If you have been prescribed any medication(s), please fill your prescription right away and begin taking the medication(s) as directed.   If you believe that any of the medications

## (undated) NOTE — LETTER
10/28/2019              [address]         Dear Parker Dior,    This letter is to inform you that our office has made several attempts to reach you by phone without success.   We were attempting to contact

## (undated) NOTE — MR AVS SNAPSHOT
[facility]  [address], [address]  [phone]               Thank you for choosing us for your health care visit with Elena Roa MD.  We are glad to serve you and happy to provide you with this summar You can get these medications from any pharmacy     Bring a paper prescription for each of these medications    - Phentermine HCl 30 MG Caps            MyChart     Visit MyChart  You can access your MyChart to more actively manage your health care and view Avoid over sized portions. Don’t eat while when you’re bored.      EAT THESE FOODS MORE OFTEN: EAT THESE FOODS LESS OFTEN:   Make half your plate fruits and vegetables Highly refined, white starches including white bread, rice and pasta   Eat plenty of pr

## (undated) NOTE — IP AVS SNAPSHOT
[facility]            (For Outpatient Use Only) Initial Admit Date: 3/6/2019   Inpt/Obs Admit Date: Inpt: 3/6/19 / Obs: N/A   Discharge Date:    Prasanna Daley:  [de-identified]   MRN: [de-identified]   CSN: [id number]        ENCOUNTER  Patient Class: Hospital Account Financial Class: [facility]    March 11, 2019

## (undated) NOTE — LETTER
5/31/2019          To Whom It May Concern:    Isabel Otero is currently under my medical care and may return to work on Monday, 06/03/19, with the following restrictions for 4 weeks:   Work in sedentary position only    If you require additional information

## (undated) NOTE — LETTER
4/2/2025              Mckenna Lo        [address]        [place] [address]         Dear Mckenna,      Thank you for putting your trust in [facility].  Our goal is to deliver the highest quality healthcare and an exceptional patient experience. Upon reviewing of your medical record shows you are due for the following:       Annual Medicare Physical         Please call [phone], to schedule your appointment or schedule online via [employer].     If you changed to a new provider at another facility, please notify the clinic to update your records.    If you had any recent testing at another facility, please have your results faxed to our office at [phone].         Thank you and have a great day!      Sincerely,    Colleen Weiler, DO          Document electronically generated by:  Susana Hummel

## (undated) NOTE — LETTER
Patient Name: Isabel Otero  YOB: 1960          MRN :  [id number]  Date:  2/12/2020  Referring Physician:  Micaela Singh  Pt has attended 16 visits in Physical Therapy.      Dx: Greater trochanteric bursitis of left hip (M7 3. Pt will have improved hip AROM Flex to 100 deg and ABD to 40 deg to be able to don/doff shoes and perform car transfers without difficulty-ongoing  4. Pt will improve L LE strength to 4+/5 to increase ease with standing and walking -ongoing  5.  Pt will Supine hip adductor stretch in BKFO position 2x10 L  Supine hip add with ball [id number]  Supine bridging 10x  R sidely clamshells L x20  R sidelying abd 2x5   NuStep UEs/LEs lvl [id number]  LTR x20   Supine hip adductor stretch in BKFO position 2x10 L  Supine h

## (undated) NOTE — LETTER
[facility]  Administration of Anesthesia  Mckenna RANDALL agree to be cared for by a physician anesthesiologist alone and/or with a nurse anesthetist, who is specially trained to monitor me and give me medicine to put me to sleep or keep me comfortable during my procedure    I understand that my anesthesiologist and/or anesthetist is not an employee or agent of [facility] or [employer]. He or she works for [facility]    As the patient asking for anesthesia services, I agree to:  Allow the anesthesiologist (anesthesia doctor) to give me medicine and do additional procedures as necessary. Some examples are: Starting or using an “IV” to give me medicine, fluids or blood during my procedure, and having a breathing tube placed to help me breathe when I’m asleep (intubation). In the event that my heart stops working properly, I understand that my anesthesiologist will make every effort to sustain my life, unless otherwise directed by [facility] Do Not Resuscitate documents.  Tell my anesthesia doctor before my procedure:  If I am pregnant.  The last time that I ate or drank.  iii. All of the medicines I take (including prescriptions, herbal supplements, and pills I can buy without a prescription (including street drugs/illegal medications). Failure to inform my anesthesiologist about these medicines may increase my risk of anesthetic complications.  iv.If I am allergic to anything or have had a reaction to anesthesia before.  I understand how the anesthesia medicine will help me (benefits).  I understand that with any type of anesthesia medicine there are risks:  The most common risks are: nausea, vomiting, sore throat, muscle soreness, damage to my eyes, mouth, or teeth (from breathing tube placement).  Rare risks include: remembering what happened during my procedure, allergic reactions to medications, injury to my airway, heart, lungs, vision, nerves, or muscles  and in extremely rare instances death.  My doctor has explained to me other choices available to me for my care (alternatives).  Pregnant Patients (“epidural”):  I understand that the risks of having an epidural (medicine given into my back to help control pain during labor), include itching, low blood pressure, difficulty urinating, headache or slowing of the baby’s heart. Very rare risks include infection, bleeding, seizure, irregular heart rhythms and nerve injury.  Regional Anesthesia (“spinal”, “epidural”, & “nerve blocks”):  I understand that rare but potential complications include headache, bleeding, infection, seizure, irregular heart rhythms, and nerve injury.    _____________________________________________________________________________  Patient (or Representative) Signature/Relationship to Patient  Date   Time    _____________________________________________________________________________   Name (if used)    Language/Organization   Time    _____________________________________________________________________________  Nurse Anesthetist Signature     Date   Time  _____________________________________________________________________________  Anesthesiologist Signature     Date   Time  I have discussed the procedure and information above with the patient (or patient’s representative) and answered their questions. The patient or their representative has agreed to have anesthesia services.    _____________________________________________________________________________  Witness        Date   Time  I have verified that the signature is that of the patient or patient’s representative, and that it was signed before the procedure  Patient Name: Mckenna Lo     : 3/30/1960                 Printed: 2025 at 8:07 AM    Medical Record #: [id number]                                            Page 1 of 1  ----------ANESTHESIA CONSENT----------

## (undated) NOTE — LETTER
Kalyan Vanegas Do  [address]  [facility]  [place]       04/18/23        Patient: Shantel Becerril   YOB: 1960   Date of Visit: 4/18/2023       Dear  Dr. Kerry Art,      Thank you for referring Shantel Becerril to my practice. Please find my assessment and plan below. Shantel Becerril is a 58year old  female w/ a pmhx of  Pre-DM,obesity, left lumbar radiculopathy, left trochanteric bursitis,  displaced right fibula fx, diverticulosis, obesity, and sleep disturbances  who presents for  B/L arm paraesthesias ongoing for months. They do not wake her from sleep but she notices then when she wakes up. She has no weakness or pain. On exam she has no weakness, possible signs of a \"ulnar claw\" in her right hand, and no sensory loss. She has absent C7 reflexes. She has gradient loss in her LE to vibration. She has a negative phalen's sign and tinel's sign at the wrist.     inspite of the lack of findings she could have either b/l ulnar neuropathies at the eblow or b/l carpal tunnel syndrome. EMG will be most useful, followed by MRI. Will also give her wrist splint and advise her to wrap her eblows at night when she sleeps.          B/L UE numbness  Differential Diagnosis:  B/L carpal tunnel syndrome  B/L ulnar neuropathy  Cervical radiculopathy/C7 radiculopathy   Doubt brachial plexopathy   Diagnostics:  Remainder of neuropathy labs  MRI c spine  Therapeutics:   wrist splints   wrap arm w/ a towel at night              Sincerely,   Echo Mayers DO   [facility] MEDICAL [facility], [address] [address]    Document electronically generated by:  Echo Mayers DO

## (undated) NOTE — MR AVS SNAPSHOT
[address]  [address]  [phone]               Thank you for choosing us for your health care visit with Ervin Avery DPM.  We are glad to serve you and happy to provide yo Vitamin C 500 MG Chew   Chew  by mouth. Commonly known as:  VITAMIN C                   Follow-up Instructions     Return in about 1 month (around 2/28/2017).       Scheduling Instructions     Monday January 30, 2017     Imaging:  XR FOOT, COMPLETE (MIN For medical emergencies, dial 911.            Visit [facility] online at  [email or web address]